# Patient Record
Sex: MALE | Race: WHITE | Employment: OTHER | URBAN - NONMETROPOLITAN AREA
[De-identification: names, ages, dates, MRNs, and addresses within clinical notes are randomized per-mention and may not be internally consistent; named-entity substitution may affect disease eponyms.]

---

## 2017-02-09 RX ORDER — GLIMEPIRIDE 2 MG/1
TABLET ORAL
Qty: 30 TABLET | Refills: 3 | Status: SHIPPED | OUTPATIENT
Start: 2017-02-09 | End: 2017-06-19 | Stop reason: SDUPTHER

## 2017-02-15 ENCOUNTER — OFFICE VISIT (OUTPATIENT)
Dept: FAMILY MEDICINE CLINIC | Age: 57
End: 2017-02-15

## 2017-02-15 VITALS
WEIGHT: 184 LBS | HEART RATE: 100 BPM | RESPIRATION RATE: 18 BRPM | HEIGHT: 65 IN | DIASTOLIC BLOOD PRESSURE: 78 MMHG | SYSTOLIC BLOOD PRESSURE: 132 MMHG | BODY MASS INDEX: 30.66 KG/M2

## 2017-02-15 DIAGNOSIS — Z23 NEED FOR VACCINATION: Primary | ICD-10-CM

## 2017-02-15 DIAGNOSIS — E78.00 PURE HYPERCHOLESTEROLEMIA: ICD-10-CM

## 2017-02-15 DIAGNOSIS — F41.8 DEPRESSION WITH ANXIETY: ICD-10-CM

## 2017-02-15 DIAGNOSIS — E11.9 TYPE 2 DIABETES MELLITUS WITHOUT COMPLICATION, WITHOUT LONG-TERM CURRENT USE OF INSULIN (HCC): ICD-10-CM

## 2017-02-15 DIAGNOSIS — R45.4 IRRITABILITY AND ANGER: ICD-10-CM

## 2017-02-15 DIAGNOSIS — I10 ESSENTIAL HYPERTENSION: ICD-10-CM

## 2017-02-15 PROCEDURE — 90686 IIV4 VACC NO PRSV 0.5 ML IM: CPT | Performed by: PHYSICIAN ASSISTANT

## 2017-02-15 PROCEDURE — 90471 IMMUNIZATION ADMIN: CPT | Performed by: PHYSICIAN ASSISTANT

## 2017-02-15 PROCEDURE — 99214 OFFICE O/P EST MOD 30 MIN: CPT | Performed by: PHYSICIAN ASSISTANT

## 2017-02-15 ASSESSMENT — ENCOUNTER SYMPTOMS
NAUSEA: 0
SHORTNESS OF BREATH: 0
VOMITING: 0
CONSTIPATION: 0
COUGH: 1
DIARRHEA: 0
WHEEZING: 0

## 2017-05-08 DIAGNOSIS — R73.01 IMPAIRED FASTING GLUCOSE: ICD-10-CM

## 2017-05-08 DIAGNOSIS — E78.5 HYPERLIPIDEMIA, UNSPECIFIED HYPERLIPIDEMIA TYPE: Primary | ICD-10-CM

## 2017-06-19 RX ORDER — GLIMEPIRIDE 2 MG/1
TABLET ORAL
Qty: 90 TABLET | Refills: 1 | Status: SHIPPED | OUTPATIENT
Start: 2017-06-19 | End: 2017-12-04 | Stop reason: SDUPTHER

## 2017-09-16 ENCOUNTER — HOSPITAL ENCOUNTER (OUTPATIENT)
Age: 57
Setting detail: SPECIMEN
Discharge: HOME | End: 2017-09-16

## 2017-09-16 ENCOUNTER — OFFICE VISIT (OUTPATIENT)
Dept: PRIMARY CARE CLINIC | Age: 57
End: 2017-09-16

## 2017-09-16 VITALS
WEIGHT: 176 LBS | SYSTOLIC BLOOD PRESSURE: 138 MMHG | OXYGEN SATURATION: 98 % | HEART RATE: 85 BPM | TEMPERATURE: 98 F | HEIGHT: 65 IN | BODY MASS INDEX: 29.32 KG/M2 | DIASTOLIC BLOOD PRESSURE: 80 MMHG

## 2017-09-16 DIAGNOSIS — R31.9 HEMATURIA: ICD-10-CM

## 2017-09-16 DIAGNOSIS — R36.1 HEMATOSPERMIA: Primary | ICD-10-CM

## 2017-09-16 LAB
-: ABNORMAL
AMORPHOUS: ABNORMAL
BACTERIA: ABNORMAL
BILIRUBIN URINE: NEGATIVE
CASTS UA: ABNORMAL /LPF (ref 0–2)
COLOR: ABNORMAL
COMMENT UA: ABNORMAL
CRYSTALS, UA: ABNORMAL /HPF
EPITHELIAL CELLS UA: ABNORMAL /HPF (ref 0–5)
GLUCOSE URINE: NEGATIVE
KETONES, URINE: NEGATIVE
LEUKOCYTE ESTERASE, URINE: NEGATIVE
MUCUS: ABNORMAL
NITRITE, URINE: NEGATIVE
OTHER OBSERVATIONS UA: ABNORMAL
PH UA: 5.5 (ref 5–6)
PROTEIN UA: NEGATIVE
RBC UA: ABNORMAL /HPF (ref 0–4)
RENAL EPITHELIAL, UA: ABNORMAL /HPF
SPECIFIC GRAVITY UA: 1.01 (ref 1.01–1.02)
TRICHOMONAS: ABNORMAL
TURBIDITY: ABNORMAL
URINE HGB: ABNORMAL
UROBILINOGEN, URINE: NORMAL
WBC UA: ABNORMAL /HPF (ref 0–4)
YEAST: ABNORMAL

## 2017-09-16 PROCEDURE — 81001 URINALYSIS AUTO W/SCOPE: CPT

## 2017-09-16 PROCEDURE — 99213 OFFICE O/P EST LOW 20 MIN: CPT | Performed by: FAMILY MEDICINE

## 2017-09-16 RX ORDER — LEVOFLOXACIN 500 MG/1
500 TABLET, FILM COATED ORAL DAILY
Qty: 10 TABLET | Refills: 0 | Status: SHIPPED | OUTPATIENT
Start: 2017-09-16 | End: 2017-09-26

## 2017-12-04 DIAGNOSIS — I10 ESSENTIAL HYPERTENSION, BENIGN: ICD-10-CM

## 2017-12-04 RX ORDER — GLIMEPIRIDE 2 MG/1
TABLET ORAL
Qty: 90 TABLET | Refills: 1 | Status: SHIPPED | OUTPATIENT
Start: 2017-12-04 | End: 2018-11-07 | Stop reason: SDUPTHER

## 2017-12-04 RX ORDER — ENALAPRIL MALEATE 20 MG/1
TABLET ORAL
Qty: 90 TABLET | Refills: 1 | Status: SHIPPED | OUTPATIENT
Start: 2017-12-04 | End: 2018-06-23 | Stop reason: SDUPTHER

## 2017-12-11 ENCOUNTER — HOSPITAL ENCOUNTER (OUTPATIENT)
Dept: LAB | Age: 57
Setting detail: SPECIMEN
Discharge: HOME | End: 2017-12-11

## 2017-12-11 DIAGNOSIS — R73.01 IMPAIRED FASTING GLUCOSE: ICD-10-CM

## 2017-12-11 DIAGNOSIS — E78.5 HYPERLIPIDEMIA, UNSPECIFIED HYPERLIPIDEMIA TYPE: ICD-10-CM

## 2017-12-11 LAB
ALBUMIN SERPL-MCNC: 4.4 G/DL (ref 3.5–5.2)
ALBUMIN/GLOBULIN RATIO: 2 (ref 1–2.5)
ALP BLD-CCNC: 82 U/L (ref 40–129)
ALT SERPL-CCNC: 40 U/L (ref 5–41)
ANION GAP SERPL CALCULATED.3IONS-SCNC: 11 MMOL/L (ref 9–17)
AST SERPL-CCNC: 26 U/L
BILIRUB SERPL-MCNC: 0.72 MG/DL (ref 0.3–1.2)
BUN BLDV-MCNC: 13 MG/DL (ref 6–20)
BUN/CREAT BLD: 16 (ref 9–20)
CALCIUM SERPL-MCNC: 9.6 MG/DL (ref 8.6–10.4)
CHLORIDE BLD-SCNC: 103 MMOL/L (ref 98–107)
CHOLESTEROL/HDL RATIO: 3.4
CHOLESTEROL: 157 MG/DL
CO2: 29 MMOL/L (ref 20–31)
CREAT SERPL-MCNC: 0.79 MG/DL (ref 0.7–1.2)
CREATININE URINE: 141.4 MG/DL (ref 39–259)
ESTIMATED AVERAGE GLUCOSE: 108 MG/DL
GFR AFRICAN AMERICAN: >60 ML/MIN
GFR NON-AFRICAN AMERICAN: >60 ML/MIN
GFR SERPL CREATININE-BSD FRML MDRD: ABNORMAL ML/MIN/{1.73_M2}
GFR SERPL CREATININE-BSD FRML MDRD: ABNORMAL ML/MIN/{1.73_M2}
GLUCOSE BLD-MCNC: 142 MG/DL (ref 70–99)
HBA1C MFR BLD: 5.4 % (ref 4.8–5.9)
HDLC SERPL-MCNC: 46 MG/DL
LDL CHOLESTEROL: 92 MG/DL (ref 0–130)
MICROALBUMIN/CREAT 24H UR: <12 MG/L
MICROALBUMIN/CREAT UR-RTO: NORMAL MCG/MG CREAT
POTASSIUM SERPL-SCNC: 4.4 MMOL/L (ref 3.7–5.3)
SODIUM BLD-SCNC: 143 MMOL/L (ref 135–144)
TOTAL PROTEIN: 6.6 G/DL (ref 6.4–8.3)
TRIGL SERPL-MCNC: 93 MG/DL
VLDLC SERPL CALC-MCNC: NORMAL MG/DL (ref 1–30)

## 2017-12-11 PROCEDURE — 82043 UR ALBUMIN QUANTITATIVE: CPT

## 2017-12-11 PROCEDURE — 36415 COLL VENOUS BLD VENIPUNCTURE: CPT

## 2017-12-11 PROCEDURE — 80053 COMPREHEN METABOLIC PANEL: CPT

## 2017-12-11 PROCEDURE — 80061 LIPID PANEL: CPT

## 2017-12-11 PROCEDURE — 83036 HEMOGLOBIN GLYCOSYLATED A1C: CPT

## 2017-12-11 PROCEDURE — 82570 ASSAY OF URINE CREATININE: CPT

## 2017-12-15 ENCOUNTER — OFFICE VISIT (OUTPATIENT)
Dept: FAMILY MEDICINE CLINIC | Age: 57
End: 2017-12-15

## 2017-12-15 VITALS
WEIGHT: 173.4 LBS | HEART RATE: 80 BPM | BODY MASS INDEX: 28.89 KG/M2 | DIASTOLIC BLOOD PRESSURE: 80 MMHG | HEIGHT: 65 IN | SYSTOLIC BLOOD PRESSURE: 136 MMHG | OXYGEN SATURATION: 98 % | RESPIRATION RATE: 12 BRPM | TEMPERATURE: 98.4 F

## 2017-12-15 DIAGNOSIS — L85.3 DRY SKIN DERMATITIS: ICD-10-CM

## 2017-12-15 DIAGNOSIS — Z23 NEED FOR INFLUENZA VACCINATION: Primary | ICD-10-CM

## 2017-12-15 DIAGNOSIS — M67.431 GANGLION CYST OF DORSUM OF RIGHT WRIST: ICD-10-CM

## 2017-12-15 PROCEDURE — 90471 IMMUNIZATION ADMIN: CPT | Performed by: NURSE PRACTITIONER

## 2017-12-15 PROCEDURE — 90686 IIV4 VACC NO PRSV 0.5 ML IM: CPT | Performed by: NURSE PRACTITIONER

## 2017-12-15 PROCEDURE — 99214 OFFICE O/P EST MOD 30 MIN: CPT | Performed by: NURSE PRACTITIONER

## 2017-12-15 RX ORDER — AMMONIUM LACTATE 12 G/100G
LOTION TOPICAL
Qty: 1 BOTTLE | Refills: 0 | Status: SHIPPED | OUTPATIENT
Start: 2017-12-15 | End: 2019-05-22

## 2017-12-15 ASSESSMENT — ENCOUNTER SYMPTOMS
CONSTIPATION: 0
COUGH: 0
EYES NEGATIVE: 1
DIARRHEA: 0
TROUBLE SWALLOWING: 0
VOMITING: 0
SHORTNESS OF BREATH: 0
SINUS PRESSURE: 0
NAUSEA: 0
CHEST TIGHTNESS: 0
ABDOMINAL PAIN: 0
ALLERGIC/IMMUNOLOGIC NEGATIVE: 1

## 2017-12-15 NOTE — PROGRESS NOTES
pain, sinus pressure and trouble swallowing. Eyes: Negative. Respiratory: Negative for cough, chest tightness and shortness of breath. Cardiovascular: Negative for chest pain and palpitations. Gastrointestinal: Negative for abdominal pain, constipation, diarrhea, nausea and vomiting. Endocrine: Negative. Genitourinary: Negative for difficulty urinating and dysuria. Musculoskeletal: Negative. Skin: Negative. Cyst to right wrist that started a few months ago  Dry cracked skin on hands     Allergic/Immunologic: Negative. Neurological: Negative for dizziness, light-headedness and headaches. Hematological: Negative. Psychiatric/Behavioral: Negative. Objective:      BP (!) 138/90 (Site: Right Arm, Position: Sitting, Cuff Size: Medium Adult)   Pulse 93   Temp 98.4 °F (36.9 °C) (Tympanic)   Resp 12   Ht 5' 5\" (1.651 m)   Wt 173 lb 6.4 oz (78.7 kg)   SpO2 98%   BMI 28.86 kg/m²     Physical Exam   Constitutional: He is oriented to person, place, and time. He appears well-developed and well-nourished. HENT:   Head: Normocephalic and atraumatic. Right Ear: Hearing and external ear normal.   Left Ear: Hearing and external ear normal.   Nose: Nose normal.   Mouth/Throat: Uvula is midline, oropharynx is clear and moist and mucous membranes are normal.   Eyes: Conjunctivae and EOM are normal. Pupils are equal, round, and reactive to light. Neck: Normal range of motion. Neck supple. Cardiovascular: Normal rate, regular rhythm, normal heart sounds and intact distal pulses. Pulmonary/Chest: Effort normal and breath sounds normal.   Abdominal: Soft. Bowel sounds are normal.   Musculoskeletal: Normal range of motion. Arms:       Hands:  Hard ganglionic cyst noted  Skin is flaky and dry with an open crack at the crease of his thumb. Neurological: He is alert and oriented to person, place, and time. Skin: Skin is warm and dry.    Psychiatric: He has a normal mood and affect. His behavior is normal. Judgment and thought content normal.         Assessment & Plan:      1. Need for influenza vaccination    - INFLUENZA, QUADV, 3 YRS AND OLDER, IM, PF, PREFILL SYR OR SDV, 0.5ML (FLUZONE QUADV, PF)    2. Ganglion cyst of dorsum of right wrist-acute new  problem  Patient was given all of his options and at this time he would like to continue to monitor. Will call if he changes his mind. 3. Dry skin dermatitis-acute new problem  trial  - ammonium lactate (LAC-HYDRIN) 12 % lotion; Apply topically daily. Dispense: 1 Bottle; Refill: 0  Answered all of the patient's questions. Agrees with plan of care. Follow up PRN.     Trice Cruz NP  12/15/2017 10:09 AM

## 2018-02-07 ENCOUNTER — OFFICE VISIT (OUTPATIENT)
Dept: ORTHOPEDIC SURGERY | Age: 58
End: 2018-02-07

## 2018-02-07 ENCOUNTER — HOSPITAL ENCOUNTER (OUTPATIENT)
Dept: GENERAL RADIOLOGY | Age: 58
Discharge: HOME | End: 2018-02-09

## 2018-02-07 ENCOUNTER — OFFICE VISIT (OUTPATIENT)
Dept: FAMILY MEDICINE CLINIC | Age: 58
End: 2018-02-07

## 2018-02-07 VITALS
HEIGHT: 65 IN | WEIGHT: 174 LBS | DIASTOLIC BLOOD PRESSURE: 78 MMHG | SYSTOLIC BLOOD PRESSURE: 130 MMHG | OXYGEN SATURATION: 98 % | HEART RATE: 83 BPM | BODY MASS INDEX: 28.99 KG/M2

## 2018-02-07 VITALS
SYSTOLIC BLOOD PRESSURE: 135 MMHG | HEIGHT: 65 IN | BODY MASS INDEX: 29.49 KG/M2 | DIASTOLIC BLOOD PRESSURE: 79 MMHG | HEART RATE: 93 BPM | WEIGHT: 177 LBS

## 2018-02-07 DIAGNOSIS — F41.9 ANXIETY: ICD-10-CM

## 2018-02-07 DIAGNOSIS — E78.2 MIXED HYPERLIPIDEMIA: ICD-10-CM

## 2018-02-07 DIAGNOSIS — I10 ESSENTIAL HYPERTENSION: ICD-10-CM

## 2018-02-07 DIAGNOSIS — M67.431 GANGLION, RIGHT WRIST: Primary | ICD-10-CM

## 2018-02-07 DIAGNOSIS — M67.431 GANGLION CYST OF WRIST, RIGHT: Primary | ICD-10-CM

## 2018-02-07 DIAGNOSIS — E11.9 TYPE 2 DIABETES MELLITUS WITHOUT COMPLICATION, WITHOUT LONG-TERM CURRENT USE OF INSULIN (HCC): ICD-10-CM

## 2018-02-07 DIAGNOSIS — N52.9 ERECTILE DYSFUNCTION, UNSPECIFIED ERECTILE DYSFUNCTION TYPE: ICD-10-CM

## 2018-02-07 DIAGNOSIS — M67.431 GANGLION CYST OF WRIST, RIGHT: ICD-10-CM

## 2018-02-07 PROCEDURE — 99203 OFFICE O/P NEW LOW 30 MIN: CPT | Performed by: FAMILY MEDICINE

## 2018-02-07 PROCEDURE — 99214 OFFICE O/P EST MOD 30 MIN: CPT | Performed by: PHYSICIAN ASSISTANT

## 2018-02-07 PROCEDURE — 73110 X-RAY EXAM OF WRIST: CPT

## 2018-02-07 PROCEDURE — 20605 DRAIN/INJ JOINT/BURSA W/O US: CPT | Performed by: FAMILY MEDICINE

## 2018-02-07 RX ORDER — TRIAMCINOLONE ACETONIDE 40 MG/ML
40 INJECTION, SUSPENSION INTRA-ARTICULAR; INTRAMUSCULAR ONCE
Status: COMPLETED | OUTPATIENT
Start: 2018-02-07 | End: 2018-02-07

## 2018-02-07 RX ADMIN — TRIAMCINOLONE ACETONIDE 40 MG: 40 INJECTION, SUSPENSION INTRA-ARTICULAR; INTRAMUSCULAR at 13:44

## 2018-02-07 ASSESSMENT — ENCOUNTER SYMPTOMS
RESPIRATORY NEGATIVE: 1
GASTROINTESTINAL NEGATIVE: 1

## 2018-02-07 ASSESSMENT — PATIENT HEALTH QUESTIONNAIRE - PHQ9
1. LITTLE INTEREST OR PLEASURE IN DOING THINGS: 0
2. FEELING DOWN, DEPRESSED OR HOPELESS: 0
SUM OF ALL RESPONSES TO PHQ QUESTIONS 1-9: 0
SUM OF ALL RESPONSES TO PHQ9 QUESTIONS 1 & 2: 0

## 2018-02-07 NOTE — PROGRESS NOTES
mmol/L Final    Chloride 12/11/2017 103  98 - 107 mmol/L Final    CO2 12/11/2017 29  20 - 31 mmol/L Final    Anion Gap 12/11/2017 11  9 - 17 mmol/L Final    Alkaline Phosphatase 12/11/2017 82  40 - 129 U/L Final    ALT 12/11/2017 40  5 - 41 U/L Final    AST 12/11/2017 26  <40 U/L Final    Total Bilirubin 12/11/2017 0.72  0.3 - 1.2 mg/dL Final    Total Protein 12/11/2017 6.6  6.4 - 8.3 g/dL Final    Alb 12/11/2017 4.4  3.5 - 5.2 g/dL Final    Albumin/Globulin Ratio 12/11/2017 2.0  1.0 - 2.5 Final    GFR Non- 12/11/2017 >60  >60 mL/min Final    GFR  12/11/2017 >60  >60 mL/min Final    GFR Comment 12/11/2017        Final    Comment: Average GFR for 52-63 years old:   80 mL/min/1.73sq m  Chronic Kidney Disease:   <60 mL/min/1.73sq m  Kidney failure:   <15 mL/min/1.73sq m              eGFR calculated using average adult body mass. Additional eGFR calculator   available at:        [email or web address]        Performed at [facility] [address] [facility] Kelsi Pabon [phone]      GFR Staging 12/11/2017 NOT REPORTED   Final    Cholesterol 12/11/2017 157  <200 mg/dL Final    Comment:    Cholesterol Guidelines:      <200  Desirable   200-240  Borderline      >240  Undesirable         HDL 12/11/2017 46  >40 mg/dL Final    Comment:    HDL Guidelines:    <40     Undesirable   40-59    Borderline    >59     Desirable         LDL Cholesterol 12/11/2017 92  0 - 130 mg/dL Final    Comment:    LDL Guidelines:     <100    Desirable   100-129   Near to/above Desirable   130-159   Borderline      >159   Undesirable     Direct (measured) LDL and calculated LDL are not interchangeable tests.       Chol/HDL Ratio 12/11/2017 3.4  <5 Final    Triglycerides 12/11/2017 93  <150 mg/dL Final    Comment:    Triglyceride Guidelines:     <150   Desirable   150-199  Borderline   200-499  High     >499   Very high   Based

## 2018-02-10 ASSESSMENT — ENCOUNTER SYMPTOMS
SINUS PAIN: 0
SORE THROAT: 0
RHINORRHEA: 0
CONSTIPATION: 0
NAUSEA: 0
SINUS PRESSURE: 0
COUGH: 0
DIARRHEA: 0
WHEEZING: 0
CHEST TIGHTNESS: 0
VOMITING: 0
TROUBLE SWALLOWING: 0
SHORTNESS OF BREATH: 0

## 2018-06-13 ENCOUNTER — TELEPHONE (OUTPATIENT)
Dept: FAMILY MEDICINE CLINIC | Age: 58
End: 2018-06-13

## 2018-06-13 DIAGNOSIS — R73.01 IFG (IMPAIRED FASTING GLUCOSE): ICD-10-CM

## 2018-06-13 DIAGNOSIS — F32.A DEPRESSION, UNSPECIFIED DEPRESSION TYPE: ICD-10-CM

## 2018-06-13 DIAGNOSIS — E78.5 HYPERLIPIDEMIA, UNSPECIFIED HYPERLIPIDEMIA TYPE: ICD-10-CM

## 2018-06-13 DIAGNOSIS — I15.9 SECONDARY HYPERTENSION: Primary | ICD-10-CM

## 2018-06-14 ENCOUNTER — HOSPITAL ENCOUNTER (OUTPATIENT)
Dept: LAB | Age: 58
Setting detail: SPECIMEN
Discharge: HOME | End: 2018-06-14

## 2018-06-14 DIAGNOSIS — E78.5 HYPERLIPIDEMIA, UNSPECIFIED HYPERLIPIDEMIA TYPE: ICD-10-CM

## 2018-06-14 DIAGNOSIS — I15.9 SECONDARY HYPERTENSION: ICD-10-CM

## 2018-06-14 DIAGNOSIS — R73.01 IFG (IMPAIRED FASTING GLUCOSE): ICD-10-CM

## 2018-06-14 DIAGNOSIS — F32.A DEPRESSION, UNSPECIFIED DEPRESSION TYPE: ICD-10-CM

## 2018-06-14 LAB
ALBUMIN SERPL-MCNC: 4.4 G/DL (ref 3.5–5.2)
ALBUMIN/GLOBULIN RATIO: 2 (ref 1–2.5)
ALP BLD-CCNC: 82 U/L (ref 40–129)
ALT SERPL-CCNC: 56 U/L (ref 5–41)
ANION GAP SERPL CALCULATED.3IONS-SCNC: 11 MMOL/L (ref 9–17)
AST SERPL-CCNC: 29 U/L
BILIRUB SERPL-MCNC: 0.68 MG/DL (ref 0.3–1.2)
BUN BLDV-MCNC: 14 MG/DL (ref 6–20)
BUN/CREAT BLD: 18 (ref 9–20)
CALCIUM SERPL-MCNC: 9.5 MG/DL (ref 8.6–10.4)
CHLORIDE BLD-SCNC: 102 MMOL/L (ref 98–107)
CHOLESTEROL/HDL RATIO: 3.6
CHOLESTEROL: 160 MG/DL
CO2: 28 MMOL/L (ref 20–31)
CREAT SERPL-MCNC: 0.77 MG/DL (ref 0.7–1.2)
CREATININE URINE: 113.9 MG/DL (ref 39–259)
ESTIMATED AVERAGE GLUCOSE: 111 MG/DL
GFR AFRICAN AMERICAN: >60 ML/MIN
GFR NON-AFRICAN AMERICAN: >60 ML/MIN
GFR SERPL CREATININE-BSD FRML MDRD: ABNORMAL ML/MIN/{1.73_M2}
GFR SERPL CREATININE-BSD FRML MDRD: ABNORMAL ML/MIN/{1.73_M2}
GLUCOSE BLD-MCNC: 133 MG/DL (ref 70–99)
HBA1C MFR BLD: 5.5 % (ref 4.8–5.9)
HDLC SERPL-MCNC: 45 MG/DL
LDL CHOLESTEROL: 96 MG/DL (ref 0–130)
MICROALBUMIN/CREAT 24H UR: <12 MG/L
MICROALBUMIN/CREAT UR-RTO: NORMAL MCG/MG CREAT
POTASSIUM SERPL-SCNC: 4.3 MMOL/L (ref 3.7–5.3)
SODIUM BLD-SCNC: 141 MMOL/L (ref 135–144)
TOTAL PROTEIN: 6.6 G/DL (ref 6.4–8.3)
TRIGL SERPL-MCNC: 95 MG/DL
TSH SERPL DL<=0.05 MIU/L-ACNC: 2.95 MIU/L (ref 0.3–5)
VLDLC SERPL CALC-MCNC: NORMAL MG/DL (ref 1–30)

## 2018-06-14 PROCEDURE — 83036 HEMOGLOBIN GLYCOSYLATED A1C: CPT

## 2018-06-14 PROCEDURE — 84443 ASSAY THYROID STIM HORMONE: CPT

## 2018-06-14 PROCEDURE — 80053 COMPREHEN METABOLIC PANEL: CPT

## 2018-06-14 PROCEDURE — 82570 ASSAY OF URINE CREATININE: CPT

## 2018-06-14 PROCEDURE — 82043 UR ALBUMIN QUANTITATIVE: CPT

## 2018-06-14 PROCEDURE — 80061 LIPID PANEL: CPT

## 2018-06-14 PROCEDURE — 36415 COLL VENOUS BLD VENIPUNCTURE: CPT

## 2018-06-15 ENCOUNTER — OFFICE VISIT (OUTPATIENT)
Dept: FAMILY MEDICINE CLINIC | Age: 58
End: 2018-06-15

## 2018-06-15 VITALS
HEIGHT: 65 IN | RESPIRATION RATE: 16 BRPM | BODY MASS INDEX: 28.66 KG/M2 | SYSTOLIC BLOOD PRESSURE: 124 MMHG | HEART RATE: 80 BPM | DIASTOLIC BLOOD PRESSURE: 60 MMHG | WEIGHT: 172 LBS | OXYGEN SATURATION: 97 %

## 2018-06-15 DIAGNOSIS — F32.9 REACTIVE DEPRESSION: ICD-10-CM

## 2018-06-15 DIAGNOSIS — E11.9 TYPE 2 DIABETES MELLITUS WITHOUT COMPLICATION, WITHOUT LONG-TERM CURRENT USE OF INSULIN (HCC): Primary | ICD-10-CM

## 2018-06-15 PROCEDURE — 99214 OFFICE O/P EST MOD 30 MIN: CPT | Performed by: PHYSICIAN ASSISTANT

## 2018-06-15 ASSESSMENT — PATIENT HEALTH QUESTIONNAIRE - PHQ9
SUM OF ALL RESPONSES TO PHQ QUESTIONS 1-9: 0
SUM OF ALL RESPONSES TO PHQ9 QUESTIONS 1 & 2: 0
2. FEELING DOWN, DEPRESSED OR HOPELESS: 0
1. LITTLE INTEREST OR PLEASURE IN DOING THINGS: 0

## 2018-06-15 ASSESSMENT — ENCOUNTER SYMPTOMS
RESPIRATORY NEGATIVE: 1
GASTROINTESTINAL NEGATIVE: 1

## 2018-06-20 ENCOUNTER — OFFICE VISIT (OUTPATIENT)
Dept: ORTHOPEDIC SURGERY | Age: 58
End: 2018-06-20

## 2018-06-20 VITALS
BODY MASS INDEX: 28.66 KG/M2 | HEART RATE: 84 BPM | HEIGHT: 65 IN | SYSTOLIC BLOOD PRESSURE: 152 MMHG | WEIGHT: 172 LBS | DIASTOLIC BLOOD PRESSURE: 80 MMHG

## 2018-06-20 DIAGNOSIS — M67.431 GANGLION, RIGHT WRIST: Primary | ICD-10-CM

## 2018-06-20 PROCEDURE — 20550 NJX 1 TENDON SHEATH/LIGAMENT: CPT | Performed by: FAMILY MEDICINE

## 2018-06-20 PROCEDURE — 99213 OFFICE O/P EST LOW 20 MIN: CPT | Performed by: FAMILY MEDICINE

## 2018-06-20 RX ORDER — TRIAMCINOLONE ACETONIDE 40 MG/ML
20 INJECTION, SUSPENSION INTRA-ARTICULAR; INTRAMUSCULAR ONCE
Status: COMPLETED | OUTPATIENT
Start: 2018-06-20 | End: 2018-06-20

## 2018-06-20 RX ADMIN — TRIAMCINOLONE ACETONIDE 20 MG: 40 INJECTION, SUSPENSION INTRA-ARTICULAR; INTRAMUSCULAR at 14:06

## 2018-06-23 DIAGNOSIS — I10 ESSENTIAL HYPERTENSION, BENIGN: ICD-10-CM

## 2018-06-25 RX ORDER — ENALAPRIL MALEATE 20 MG/1
TABLET ORAL
Qty: 90 TABLET | Refills: 1 | Status: SHIPPED | OUTPATIENT
Start: 2018-06-25 | End: 2019-01-02 | Stop reason: SDUPTHER

## 2018-09-10 ENCOUNTER — NURSE ONLY (OUTPATIENT)
Dept: LAB | Age: 58
End: 2018-09-10

## 2018-09-10 ENCOUNTER — HOSPITAL ENCOUNTER (OUTPATIENT)
Dept: LAB | Age: 58
Setting detail: SPECIMEN
Discharge: HOME | End: 2018-09-10

## 2018-09-10 VITALS — DIASTOLIC BLOOD PRESSURE: 84 MMHG | SYSTOLIC BLOOD PRESSURE: 136 MMHG | HEART RATE: 74 BPM

## 2018-09-10 DIAGNOSIS — Z01.30 BP CHECK: Primary | ICD-10-CM

## 2018-09-10 DIAGNOSIS — E11.9 TYPE 2 DIABETES MELLITUS WITHOUT COMPLICATION, WITHOUT LONG-TERM CURRENT USE OF INSULIN (HCC): ICD-10-CM

## 2018-09-10 LAB
ALBUMIN SERPL-MCNC: 4.7 G/DL (ref 3.5–5.2)
ALBUMIN/GLOBULIN RATIO: 1.9 (ref 1–2.5)
ALP BLD-CCNC: 86 U/L (ref 40–129)
ALT SERPL-CCNC: 54 U/L (ref 5–41)
ANION GAP SERPL CALCULATED.3IONS-SCNC: 10 MMOL/L (ref 9–17)
AST SERPL-CCNC: 30 U/L
BILIRUB SERPL-MCNC: 0.58 MG/DL (ref 0.3–1.2)
BUN BLDV-MCNC: 14 MG/DL (ref 6–20)
BUN/CREAT BLD: 17 (ref 9–20)
CALCIUM SERPL-MCNC: 9.6 MG/DL (ref 8.6–10.4)
CHLORIDE BLD-SCNC: 101 MMOL/L (ref 98–107)
CO2: 29 MMOL/L (ref 20–31)
CREAT SERPL-MCNC: 0.82 MG/DL (ref 0.7–1.2)
ESTIMATED AVERAGE GLUCOSE: 126 MG/DL
GFR AFRICAN AMERICAN: >60 ML/MIN
GFR NON-AFRICAN AMERICAN: >60 ML/MIN
GFR SERPL CREATININE-BSD FRML MDRD: ABNORMAL ML/MIN/{1.73_M2}
GFR SERPL CREATININE-BSD FRML MDRD: ABNORMAL ML/MIN/{1.73_M2}
GLUCOSE BLD-MCNC: 154 MG/DL (ref 70–99)
HBA1C MFR BLD: 6 % (ref 4.8–5.9)
POTASSIUM SERPL-SCNC: 5 MMOL/L (ref 3.7–5.3)
SODIUM BLD-SCNC: 140 MMOL/L (ref 135–144)
TOTAL PROTEIN: 7.2 G/DL (ref 6.4–8.3)

## 2018-09-10 PROCEDURE — 80053 COMPREHEN METABOLIC PANEL: CPT

## 2018-09-10 PROCEDURE — 83036 HEMOGLOBIN GLYCOSYLATED A1C: CPT

## 2018-09-10 PROCEDURE — 36415 COLL VENOUS BLD VENIPUNCTURE: CPT

## 2018-09-11 ENCOUNTER — TELEPHONE (OUTPATIENT)
Dept: FAMILY MEDICINE CLINIC | Age: 58
End: 2018-09-11

## 2018-09-11 NOTE — TELEPHONE ENCOUNTER
Spoke with patient about his lab results and he says he has been taking the metformin as ordered except for the night before the test he did not take his second dose.

## 2018-09-12 DIAGNOSIS — R73.09 ELEVATED GLUCOSE: Primary | ICD-10-CM

## 2018-09-12 DIAGNOSIS — E78.5 HYPERLIPIDEMIA, UNSPECIFIED HYPERLIPIDEMIA TYPE: ICD-10-CM

## 2018-09-12 NOTE — TELEPHONE ENCOUNTER
[facility] spoke with him he is to take amaryl again and take all medications as prescribed  Repeat labs 3 months

## 2018-11-07 RX ORDER — GLIMEPIRIDE 2 MG/1
TABLET ORAL
Qty: 90 TABLET | Refills: 1 | Status: SHIPPED | OUTPATIENT
Start: 2018-11-07 | End: 2019-05-11 | Stop reason: SDUPTHER

## 2018-11-12 ENCOUNTER — OFFICE VISIT (OUTPATIENT)
Dept: FAMILY MEDICINE CLINIC | Age: 58
End: 2018-11-12

## 2018-11-12 VITALS
OXYGEN SATURATION: 98 % | DIASTOLIC BLOOD PRESSURE: 65 MMHG | BODY MASS INDEX: 29.32 KG/M2 | HEIGHT: 65 IN | HEART RATE: 118 BPM | SYSTOLIC BLOOD PRESSURE: 139 MMHG | WEIGHT: 176 LBS

## 2018-11-12 DIAGNOSIS — J01.90 ACUTE BACTERIAL SINUSITIS: Primary | ICD-10-CM

## 2018-11-12 DIAGNOSIS — R09.82 PND (POST-NASAL DRIP): ICD-10-CM

## 2018-11-12 DIAGNOSIS — R05.9 COUGH: ICD-10-CM

## 2018-11-12 DIAGNOSIS — B96.89 ACUTE BACTERIAL SINUSITIS: Primary | ICD-10-CM

## 2018-11-12 PROCEDURE — 99213 OFFICE O/P EST LOW 20 MIN: CPT | Performed by: NURSE PRACTITIONER

## 2018-11-12 RX ORDER — AMOXICILLIN 500 MG/1
500 CAPSULE ORAL 3 TIMES DAILY
Qty: 30 CAPSULE | Refills: 0 | Status: SHIPPED | OUTPATIENT
Start: 2018-11-12 | End: 2018-11-30

## 2018-11-12 RX ORDER — LORATADINE 10 MG/1
10 TABLET ORAL DAILY
Qty: 30 TABLET | Refills: 0 | Status: SHIPPED | OUTPATIENT
Start: 2018-11-12 | End: 2018-12-12

## 2018-11-12 RX ORDER — BENZONATATE 100 MG/1
100 CAPSULE ORAL 2 TIMES DAILY PRN
Qty: 20 CAPSULE | Refills: 0 | Status: SHIPPED | OUTPATIENT
Start: 2018-11-12 | End: 2018-11-19

## 2018-11-12 ASSESSMENT — ENCOUNTER SYMPTOMS
SINUS PRESSURE: 0
EYES NEGATIVE: 1
TROUBLE SWALLOWING: 0
ABDOMINAL PAIN: 0
RHINORRHEA: 1
SORE THROAT: 1
COUGH: 1
CONSTIPATION: 0
VOMITING: 0
SHORTNESS OF BREATH: 0
NAUSEA: 0
DIARRHEA: 0
CHEST TIGHTNESS: 0
ALLERGIC/IMMUNOLOGIC NEGATIVE: 1

## 2018-11-30 ENCOUNTER — OFFICE VISIT (OUTPATIENT)
Dept: FAMILY MEDICINE CLINIC | Age: 58
End: 2018-11-30

## 2018-11-30 ENCOUNTER — HOSPITAL ENCOUNTER (OUTPATIENT)
Dept: LAB | Age: 58
Discharge: HOME | End: 2018-11-30

## 2018-11-30 VITALS
DIASTOLIC BLOOD PRESSURE: 70 MMHG | OXYGEN SATURATION: 97 % | SYSTOLIC BLOOD PRESSURE: 138 MMHG | WEIGHT: 174 LBS | BODY MASS INDEX: 28.99 KG/M2 | HEART RATE: 80 BPM | HEIGHT: 65 IN

## 2018-11-30 DIAGNOSIS — Z23 FLU VACCINE NEED: ICD-10-CM

## 2018-11-30 DIAGNOSIS — N30.01 ACUTE CYSTITIS WITH HEMATURIA: Primary | ICD-10-CM

## 2018-11-30 DIAGNOSIS — E78.5 HYPERLIPIDEMIA, UNSPECIFIED HYPERLIPIDEMIA TYPE: ICD-10-CM

## 2018-11-30 DIAGNOSIS — R31.9 HEMATURIA, UNSPECIFIED TYPE: ICD-10-CM

## 2018-11-30 DIAGNOSIS — R73.09 ELEVATED GLUCOSE: ICD-10-CM

## 2018-11-30 DIAGNOSIS — R82.998 DARK URINE: ICD-10-CM

## 2018-11-30 LAB
-: ABNORMAL
ALBUMIN SERPL-MCNC: 4.2 G/DL (ref 3.5–5.2)
ALBUMIN/GLOBULIN RATIO: 1.5 (ref 1–2.5)
ALP BLD-CCNC: 72 U/L (ref 40–129)
ALT SERPL-CCNC: 53 U/L (ref 5–41)
AMORPHOUS: ABNORMAL
ANION GAP SERPL CALCULATED.3IONS-SCNC: 13 MMOL/L (ref 9–17)
AST SERPL-CCNC: 42 U/L
BACTERIA: ABNORMAL
BILIRUB SERPL-MCNC: 0.82 MG/DL (ref 0.3–1.2)
BILIRUBIN URINE: ABNORMAL
BUN BLDV-MCNC: 14 MG/DL (ref 6–20)
BUN/CREAT BLD: 15 (ref 9–20)
CALCIUM SERPL-MCNC: 9.2 MG/DL (ref 8.6–10.4)
CASTS UA: ABNORMAL /LPF (ref 0–2)
CHLORIDE BLD-SCNC: 95 MMOL/L (ref 98–107)
CHOLESTEROL/HDL RATIO: 5.5
CHOLESTEROL: 154 MG/DL
CO2: 26 MMOL/L (ref 20–31)
COLOR: ABNORMAL
COMMENT UA: ABNORMAL
CREAT SERPL-MCNC: 0.94 MG/DL (ref 0.7–1.2)
CREATININE URINE: 349.1 MG/DL (ref 39–259)
CRYSTALS, UA: ABNORMAL /HPF
EPITHELIAL CELLS UA: ABNORMAL /HPF (ref 0–5)
ESTIMATED AVERAGE GLUCOSE: 117 MG/DL
GFR AFRICAN AMERICAN: >60 ML/MIN
GFR NON-AFRICAN AMERICAN: >60 ML/MIN
GFR SERPL CREATININE-BSD FRML MDRD: ABNORMAL ML/MIN/{1.73_M2}
GFR SERPL CREATININE-BSD FRML MDRD: ABNORMAL ML/MIN/{1.73_M2}
GLUCOSE BLD-MCNC: 184 MG/DL (ref 70–99)
GLUCOSE URINE: NEGATIVE
HBA1C MFR BLD: 5.7 % (ref 4.8–5.9)
HDLC SERPL-MCNC: 28 MG/DL
KETONES, URINE: NEGATIVE
LDL CHOLESTEROL: 100 MG/DL (ref 0–130)
LEUKOCYTE ESTERASE, URINE: ABNORMAL
MICROALBUMIN/CREAT 24H UR: 176 MG/L
MICROALBUMIN/CREAT UR-RTO: 50 MCG/MG CREAT
MUCUS: ABNORMAL
NITRITE, URINE: POSITIVE
OTHER OBSERVATIONS UA: ABNORMAL
PH UA: 6 (ref 5–6)
POTASSIUM SERPL-SCNC: 4.7 MMOL/L (ref 3.7–5.3)
PROTEIN UA: ABNORMAL
RBC UA: ABNORMAL /HPF (ref 0–4)
RENAL EPITHELIAL, UA: ABNORMAL /HPF
SODIUM BLD-SCNC: 134 MMOL/L (ref 135–144)
SPECIFIC GRAVITY UA: 1.02 (ref 1.01–1.02)
TOTAL PROTEIN: 7 G/DL (ref 6.4–8.3)
TRICHOMONAS: ABNORMAL
TRIGL SERPL-MCNC: 131 MG/DL
TURBIDITY: ABNORMAL
URINE HGB: ABNORMAL
UROBILINOGEN, URINE: NORMAL
VLDLC SERPL CALC-MCNC: ABNORMAL MG/DL (ref 1–30)
WBC UA: >50 /HPF (ref 0–4)
YEAST: ABNORMAL

## 2018-11-30 PROCEDURE — 81001 URINALYSIS AUTO W/SCOPE: CPT

## 2018-11-30 PROCEDURE — 83036 HEMOGLOBIN GLYCOSYLATED A1C: CPT

## 2018-11-30 PROCEDURE — 90471 IMMUNIZATION ADMIN: CPT | Performed by: NURSE PRACTITIONER

## 2018-11-30 PROCEDURE — 99213 OFFICE O/P EST LOW 20 MIN: CPT | Performed by: NURSE PRACTITIONER

## 2018-11-30 PROCEDURE — 90686 IIV4 VACC NO PRSV 0.5 ML IM: CPT | Performed by: NURSE PRACTITIONER

## 2018-11-30 PROCEDURE — 80061 LIPID PANEL: CPT

## 2018-11-30 PROCEDURE — 82043 UR ALBUMIN QUANTITATIVE: CPT

## 2018-11-30 PROCEDURE — 80053 COMPREHEN METABOLIC PANEL: CPT

## 2018-11-30 PROCEDURE — 87086 URINE CULTURE/COLONY COUNT: CPT

## 2018-11-30 PROCEDURE — 82570 ASSAY OF URINE CREATININE: CPT

## 2018-11-30 PROCEDURE — 87186 SC STD MICRODIL/AGAR DIL: CPT

## 2018-11-30 PROCEDURE — 36415 COLL VENOUS BLD VENIPUNCTURE: CPT

## 2018-11-30 PROCEDURE — 87088 URINE BACTERIA CULTURE: CPT

## 2018-11-30 RX ORDER — SULFAMETHOXAZOLE AND TRIMETHOPRIM 800; 160 MG/1; MG/1
1 TABLET ORAL 2 TIMES DAILY
Qty: 20 TABLET | Refills: 0 | Status: SHIPPED | OUTPATIENT
Start: 2018-11-30 | End: 2018-12-14 | Stop reason: ALTCHOICE

## 2018-11-30 RX ORDER — CIPROFLOXACIN 500 MG/1
500 TABLET, FILM COATED ORAL 2 TIMES DAILY
Qty: 20 TABLET | Refills: 0 | Status: CANCELLED | OUTPATIENT
Start: 2018-11-30

## 2018-11-30 ASSESSMENT — ENCOUNTER SYMPTOMS
ALLERGIC/IMMUNOLOGIC NEGATIVE: 1
DIARRHEA: 0
COUGH: 0
ABDOMINAL PAIN: 0
NAUSEA: 0
CONSTIPATION: 0
VOMITING: 0
SHORTNESS OF BREATH: 0
TROUBLE SWALLOWING: 0
CHEST TIGHTNESS: 0
EYES NEGATIVE: 1
GASTROINTESTINAL NEGATIVE: 1
SINUS PRESSURE: 0

## 2018-11-30 NOTE — PROGRESS NOTES
congestion, ear pain, sinus pressure and trouble swallowing. Eyes: Negative. Respiratory: Negative for cough, chest tightness and shortness of breath. Cardiovascular: Negative. Negative for chest pain and palpitations. Gastrointestinal: Negative. Negative for abdominal pain, constipation, diarrhea, nausea and vomiting. Endocrine: Negative. Genitourinary: Positive for decreased urine volume and urgency. Negative for difficulty urinating, dysuria, flank pain, frequency and hematuria. Dark urine     Musculoskeletal: Negative. Skin: Negative. Allergic/Immunologic: Negative. Neurological: Negative for dizziness, light-headedness and headaches. Hematological: Negative. Psychiatric/Behavioral: Negative. Objective:      /70   Pulse 80   Ht 5' 5\" (1.651 m)   Wt 174 lb (78.9 kg)   SpO2 97%   BMI 28.96 kg/m²     Physical Exam   Constitutional: He is oriented to person, place, and time. He appears well-developed and well-nourished. HENT:   Head: Normocephalic and atraumatic. Right Ear: External ear normal.   Left Ear: External ear normal.   Nose: Nose normal.   Eyes: Pupils are equal, round, and reactive to light. Conjunctivae and EOM are normal.   Neck: Normal range of motion. Cardiovascular: Normal rate, regular rhythm and normal heart sounds. Pulmonary/Chest: Effort normal and breath sounds normal.   Abdominal: Soft. Bowel sounds are normal.   Musculoskeletal: Normal range of motion. Neurological: He is alert and oriented to person, place, and time. Skin: Skin is warm and dry. Psychiatric: He has a normal mood and affect. His behavior is normal. Judgment and thought content normal.   Nursing note and vitals reviewed. Assessment & Plan:      1. Acute cystitis with hematuria-new problem    - Urinalysis Reflex to Culture; Future  - sulfamethoxazole-trimethoprim (BACTRIM DS) 800-160 MG per tablet;  Take 1 tablet by mouth 2 times daily  Dispense: 20

## 2018-12-01 LAB
CULTURE: ABNORMAL
Lab: ABNORMAL
ORGANISM: ABNORMAL
SPECIMEN DESCRIPTION: ABNORMAL
STATUS: ABNORMAL

## 2018-12-03 ENCOUNTER — TELEPHONE (OUTPATIENT)
Dept: FAMILY MEDICINE CLINIC | Age: 58
End: 2018-12-03

## 2018-12-03 RX ORDER — PHENAZOPYRIDINE HYDROCHLORIDE 100 MG/1
100 TABLET, FILM COATED ORAL 3 TIMES DAILY PRN
Qty: 30 TABLET | Refills: 0 | Status: SHIPPED | OUTPATIENT
Start: 2018-12-03 | End: 2018-12-14 | Stop reason: ALTCHOICE

## 2018-12-03 NOTE — TELEPHONE ENCOUNTER
The bactrim should be treating the infection but I would recommend pyridium to help with the pain. I will send it to the pharmacy.

## 2018-12-03 NOTE — TELEPHONE ENCOUNTER
Pt calling stating that he is still having burning with urination. Pt would like to know what he should do. Please call pt.

## 2018-12-14 ENCOUNTER — HOSPITAL ENCOUNTER (OUTPATIENT)
Age: 58
Setting detail: SPECIMEN
Discharge: HOME | End: 2018-12-14

## 2018-12-14 ENCOUNTER — OFFICE VISIT (OUTPATIENT)
Dept: FAMILY MEDICINE CLINIC | Age: 58
End: 2018-12-14

## 2018-12-14 VITALS
RESPIRATION RATE: 16 BRPM | BODY MASS INDEX: 28.66 KG/M2 | HEIGHT: 65 IN | SYSTOLIC BLOOD PRESSURE: 120 MMHG | DIASTOLIC BLOOD PRESSURE: 66 MMHG | OXYGEN SATURATION: 98 % | WEIGHT: 172 LBS | HEART RATE: 89 BPM

## 2018-12-14 DIAGNOSIS — R39.9 URINARY SYMPTOM OR SIGN: Primary | ICD-10-CM

## 2018-12-14 DIAGNOSIS — N52.9 ERECTILE DYSFUNCTION, UNSPECIFIED ERECTILE DYSFUNCTION TYPE: ICD-10-CM

## 2018-12-14 DIAGNOSIS — E11.9 TYPE 2 DIABETES MELLITUS WITHOUT COMPLICATION, WITHOUT LONG-TERM CURRENT USE OF INSULIN (HCC): ICD-10-CM

## 2018-12-14 DIAGNOSIS — E78.00 PURE HYPERCHOLESTEROLEMIA: ICD-10-CM

## 2018-12-14 DIAGNOSIS — F32.9 REACTIVE DEPRESSION: ICD-10-CM

## 2018-12-14 DIAGNOSIS — F41.9 ANXIETY: ICD-10-CM

## 2018-12-14 DIAGNOSIS — R39.9 URINARY SYMPTOM OR SIGN: ICD-10-CM

## 2018-12-14 DIAGNOSIS — Z23 NEED FOR SHINGLES VACCINE: ICD-10-CM

## 2018-12-14 DIAGNOSIS — I10 ESSENTIAL HYPERTENSION: ICD-10-CM

## 2018-12-14 LAB
-: NORMAL
AMORPHOUS: NORMAL
BACTERIA: NORMAL
BILIRUBIN URINE: NEGATIVE
CASTS UA: NORMAL /LPF (ref 0–2)
COLOR: ABNORMAL
COMMENT UA: ABNORMAL
CRYSTALS, UA: NORMAL /HPF
EPITHELIAL CELLS UA: NORMAL /HPF (ref 0–5)
GLUCOSE URINE: ABNORMAL
KETONES, URINE: NEGATIVE
LEUKOCYTE ESTERASE, URINE: NEGATIVE
MUCUS: NORMAL
NITRITE, URINE: NEGATIVE
OTHER OBSERVATIONS UA: NORMAL
PH UA: 6 (ref 5–6)
PROTEIN UA: NEGATIVE
RBC UA: NORMAL /HPF (ref 0–4)
RENAL EPITHELIAL, UA: NORMAL /HPF
SPECIFIC GRAVITY UA: 1.02 (ref 1.01–1.02)
TRICHOMONAS: NORMAL
TURBIDITY: ABNORMAL
URINE HGB: NEGATIVE
UROBILINOGEN, URINE: NORMAL
WBC UA: NORMAL /HPF (ref 0–4)
YEAST: NORMAL

## 2018-12-14 PROCEDURE — 99214 OFFICE O/P EST MOD 30 MIN: CPT | Performed by: PHYSICIAN ASSISTANT

## 2018-12-14 PROCEDURE — 81001 URINALYSIS AUTO W/SCOPE: CPT

## 2018-12-14 PROCEDURE — 87086 URINE CULTURE/COLONY COUNT: CPT

## 2018-12-14 ASSESSMENT — PATIENT HEALTH QUESTIONNAIRE - PHQ9
SUM OF ALL RESPONSES TO PHQ QUESTIONS 1-9: 0
1. LITTLE INTEREST OR PLEASURE IN DOING THINGS: 0
SUM OF ALL RESPONSES TO PHQ QUESTIONS 1-9: 0
2. FEELING DOWN, DEPRESSED OR HOPELESS: 0
SUM OF ALL RESPONSES TO PHQ9 QUESTIONS 1 & 2: 0

## 2018-12-14 ASSESSMENT — ENCOUNTER SYMPTOMS
GASTROINTESTINAL NEGATIVE: 1
RESPIRATORY NEGATIVE: 1

## 2018-12-14 NOTE — PROGRESS NOTES
is no rebound and no guarding. No hernia. Musculoskeletal: He exhibits no edema. Lymphadenopathy:     He has no cervical adenopathy. Neurological: He is alert and oriented to person, place, and time. Skin: Skin is warm and dry. No rash noted. No pallor. Psychiatric: He has a normal mood and affect. He has somewhat of a flat affect this is normal for patient   Nursing note and vitals reviewed. Hospital Outpatient Visit on 12/14/2018   Component Date Value Ref Range Status    Color, UA 12/14/2018 NOT REPORTED  YEL Final    Turbidity UA 12/14/2018 NOT REPORTED  CLEAR Final    Glucose, Ur 12/14/2018 TRACE* NEG Final    Bilirubin Urine 12/14/2018 NEGATIVE  NEG Final    Ketones, Urine 12/14/2018 NEGATIVE  NEG Final    Specific Gravity, UA 12/14/2018 1.025  1.010 - 1.025 Final    Urine Hgb 12/14/2018 NEGATIVE  NEG Final    pH, UA 12/14/2018 6.0  5.0 - 6.0 Final    Protein, UA 12/14/2018 NEGATIVE  NEG Final    Urobilinogen, Urine 12/14/2018 Normal  NORM Final    Nitrite, Urine 12/14/2018 NEGATIVE  NEG Final    Leukocyte Esterase, Urine 12/14/2018 NEGATIVE  NEG Final    Urinalysis Comments 12/14/2018 NOT REPORTED   Final    - 12/14/2018        Final    WBC, UA 12/14/2018 0 TO 4  0 - 4 /HPF Final    RBC, UA 12/14/2018 0 TO 4  0 - 4 /HPF Final    Casts UA 12/14/2018 NOT REPORTED  0 - 2 /LPF Final    Crystals UA 12/14/2018 NOT REPORTED  NONE /HPF Final    Epithelial Cells UA 12/14/2018 0 TO 4  0 - 5 /HPF Final    Renal Epithelial, Urine 12/14/2018 NOT REPORTED  0 /HPF Final    Bacteria, UA 12/14/2018 None  NONE Final    Mucus, UA 12/14/2018 NOT REPORTED  NONE Final    Trichomonas, UA 12/14/2018 NOT REPORTED  NONE Final    Amorphous, UA 12/14/2018 NOT REPORTED  NONE Final    Other Observations UA 12/14/2018 NOT REPORTED  NREQ Final    Yeast, UA 12/14/2018 NOT REPORTED  NONE Final    Specimen Description 12/14/2018 . CLEAN CATCH URINE   Final    Special Requests 12/14/2018 NOT REPORTED

## 2018-12-15 LAB
CULTURE: NO GROWTH
Lab: NORMAL
SPECIMEN DESCRIPTION: NORMAL
STATUS: NORMAL

## 2019-01-02 DIAGNOSIS — I10 ESSENTIAL HYPERTENSION, BENIGN: ICD-10-CM

## 2019-01-03 RX ORDER — ENALAPRIL MALEATE 20 MG/1
TABLET ORAL
Qty: 90 TABLET | Refills: 1 | Status: SHIPPED | OUTPATIENT
Start: 2019-01-03 | End: 2019-07-01 | Stop reason: SDUPTHER

## 2019-01-26 NOTE — PROGRESS NOTES
Pt to injection room for BP check. Rested 15 mins. Prior to reading. /84 pulse 74 pt took all medications today.     Retake:  136/84 no fever and no chills.

## 2019-02-08 ENCOUNTER — OFFICE VISIT (OUTPATIENT)
Dept: FAMILY MEDICINE CLINIC | Age: 59
End: 2019-02-08

## 2019-02-08 VITALS
SYSTOLIC BLOOD PRESSURE: 148 MMHG | HEIGHT: 65 IN | RESPIRATION RATE: 18 BRPM | OXYGEN SATURATION: 97 % | DIASTOLIC BLOOD PRESSURE: 88 MMHG | TEMPERATURE: 97.7 F | WEIGHT: 177 LBS | BODY MASS INDEX: 29.49 KG/M2 | HEART RATE: 105 BPM

## 2019-02-08 DIAGNOSIS — F33.9 RECURRENT MAJOR DEPRESSIVE DISORDER, REMISSION STATUS UNSPECIFIED (HCC): ICD-10-CM

## 2019-02-08 DIAGNOSIS — R51.9 NONINTRACTABLE EPISODIC HEADACHE, UNSPECIFIED HEADACHE TYPE: ICD-10-CM

## 2019-02-08 DIAGNOSIS — R35.1 NOCTURIA: ICD-10-CM

## 2019-02-08 DIAGNOSIS — N40.1 BENIGN PROSTATIC HYPERPLASIA WITH INCOMPLETE BLADDER EMPTYING: ICD-10-CM

## 2019-02-08 DIAGNOSIS — R39.14 BENIGN PROSTATIC HYPERPLASIA WITH INCOMPLETE BLADDER EMPTYING: ICD-10-CM

## 2019-02-08 DIAGNOSIS — R39.198 DIFFICULTY URINATING: ICD-10-CM

## 2019-02-08 DIAGNOSIS — R97.20 ABNORMAL PROSTATE SPECIFIC ANTIGEN: ICD-10-CM

## 2019-02-08 DIAGNOSIS — J30.1 SEASONAL ALLERGIC RHINITIS DUE TO POLLEN: Primary | ICD-10-CM

## 2019-02-08 PROCEDURE — 99213 OFFICE O/P EST LOW 20 MIN: CPT | Performed by: PHYSICIAN ASSISTANT

## 2019-02-08 RX ORDER — LORATADINE 10 MG/1
10 TABLET ORAL DAILY
Qty: 30 TABLET | Refills: 3 | Status: SHIPPED | OUTPATIENT
Start: 2019-02-08 | End: 2019-07-01 | Stop reason: SDUPTHER

## 2019-02-08 RX ORDER — TAMSULOSIN HYDROCHLORIDE 0.4 MG/1
0.4 CAPSULE ORAL DAILY
Qty: 30 CAPSULE | Refills: 5 | Status: SHIPPED | OUTPATIENT
Start: 2019-02-08 | End: 2019-08-12 | Stop reason: SDUPTHER

## 2019-02-08 RX ORDER — IBUPROFEN 800 MG/1
800 TABLET ORAL EVERY 8 HOURS PRN
Qty: 30 TABLET | Refills: 5 | Status: SHIPPED | OUTPATIENT
Start: 2019-02-08 | End: 2020-09-15 | Stop reason: ALTCHOICE

## 2019-02-08 ASSESSMENT — ENCOUNTER SYMPTOMS
RHINORRHEA: 1
GASTROINTESTINAL NEGATIVE: 1
SORE THROAT: 0
RESPIRATORY NEGATIVE: 1

## 2019-02-08 ASSESSMENT — PATIENT HEALTH QUESTIONNAIRE - PHQ9
SUM OF ALL RESPONSES TO PHQ QUESTIONS 1-9: 0
SUM OF ALL RESPONSES TO PHQ9 QUESTIONS 1 & 2: 0
2. FEELING DOWN, DEPRESSED OR HOPELESS: 0
SUM OF ALL RESPONSES TO PHQ QUESTIONS 1-9: 0
1. LITTLE INTEREST OR PLEASURE IN DOING THINGS: 0

## 2019-02-10 ASSESSMENT — ENCOUNTER SYMPTOMS
CONSTIPATION: 0
VOMITING: 0
WHEEZING: 0
NAUSEA: 0
CHEST TIGHTNESS: 0
DIARRHEA: 0
COUGH: 0

## 2019-03-15 ENCOUNTER — HOSPITAL ENCOUNTER (OUTPATIENT)
Dept: LAB | Age: 59
Discharge: HOME | End: 2019-03-15

## 2019-03-15 DIAGNOSIS — R39.198 DIFFICULTY URINATING: ICD-10-CM

## 2019-03-15 DIAGNOSIS — N40.1 BENIGN PROSTATIC HYPERPLASIA WITH INCOMPLETE BLADDER EMPTYING: ICD-10-CM

## 2019-03-15 DIAGNOSIS — R97.20 ABNORMAL PROSTATE SPECIFIC ANTIGEN: ICD-10-CM

## 2019-03-15 DIAGNOSIS — E11.9 TYPE 2 DIABETES MELLITUS WITHOUT COMPLICATION, WITHOUT LONG-TERM CURRENT USE OF INSULIN (HCC): ICD-10-CM

## 2019-03-15 DIAGNOSIS — R39.14 BENIGN PROSTATIC HYPERPLASIA WITH INCOMPLETE BLADDER EMPTYING: ICD-10-CM

## 2019-03-15 DIAGNOSIS — N30.01 ACUTE CYSTITIS WITH HEMATURIA: ICD-10-CM

## 2019-03-15 DIAGNOSIS — R82.998 DARK URINE: ICD-10-CM

## 2019-03-15 DIAGNOSIS — R31.9 HEMATURIA, UNSPECIFIED TYPE: ICD-10-CM

## 2019-03-15 DIAGNOSIS — R35.1 NOCTURIA: ICD-10-CM

## 2019-03-15 LAB
-: NORMAL
ALBUMIN SERPL-MCNC: 4.4 G/DL (ref 3.5–5.2)
ALBUMIN/GLOBULIN RATIO: 1.9 (ref 1–2.5)
ALP BLD-CCNC: 71 U/L (ref 40–129)
ALT SERPL-CCNC: 32 U/L (ref 5–41)
AMORPHOUS: NORMAL
ANION GAP SERPL CALCULATED.3IONS-SCNC: 13 MMOL/L (ref 9–17)
AST SERPL-CCNC: 21 U/L
BACTERIA: NORMAL
BILIRUB SERPL-MCNC: 0.51 MG/DL (ref 0.3–1.2)
BILIRUBIN URINE: NEGATIVE
BUN BLDV-MCNC: 17 MG/DL (ref 6–20)
BUN/CREAT BLD: 19 (ref 9–20)
CALCIUM SERPL-MCNC: 9.2 MG/DL (ref 8.6–10.4)
CASTS UA: NORMAL /LPF (ref 0–2)
CHLORIDE BLD-SCNC: 100 MMOL/L (ref 98–107)
CO2: 28 MMOL/L (ref 20–31)
COLOR: ABNORMAL
COMMENT UA: ABNORMAL
CREAT SERPL-MCNC: 0.91 MG/DL (ref 0.7–1.2)
CREATININE URINE: 88.8 MG/DL (ref 39–259)
CRYSTALS, UA: NORMAL /HPF
EPITHELIAL CELLS UA: NORMAL /HPF (ref 0–5)
ESTIMATED AVERAGE GLUCOSE: 108 MG/DL
GFR AFRICAN AMERICAN: >60 ML/MIN
GFR NON-AFRICAN AMERICAN: >60 ML/MIN
GFR SERPL CREATININE-BSD FRML MDRD: ABNORMAL ML/MIN/{1.73_M2}
GFR SERPL CREATININE-BSD FRML MDRD: ABNORMAL ML/MIN/{1.73_M2}
GLUCOSE BLD-MCNC: 126 MG/DL (ref 70–99)
GLUCOSE URINE: NEGATIVE
HBA1C MFR BLD: 5.4 % (ref 4.8–5.9)
KETONES, URINE: NEGATIVE
LEUKOCYTE ESTERASE, URINE: NEGATIVE
MICROALBUMIN/CREAT 24H UR: <12 MG/L
MICROALBUMIN/CREAT UR-RTO: NORMAL MCG/MG CREAT
MUCUS: NORMAL
NITRITE, URINE: NEGATIVE
OTHER OBSERVATIONS UA: NORMAL
PH UA: 6 (ref 5–6)
POTASSIUM SERPL-SCNC: 3.8 MMOL/L (ref 3.7–5.3)
PROSTATE SPECIFIC ANTIGEN: 6.32 UG/L
PROTEIN UA: NEGATIVE
RBC UA: NORMAL /HPF (ref 0–4)
RENAL EPITHELIAL, UA: NORMAL /HPF
SODIUM BLD-SCNC: 141 MMOL/L (ref 135–144)
SPECIFIC GRAVITY UA: 1.01 (ref 1.01–1.02)
TOTAL PROTEIN: 6.7 G/DL (ref 6.4–8.3)
TRICHOMONAS: NORMAL
TURBIDITY: ABNORMAL
URINE HGB: ABNORMAL
UROBILINOGEN, URINE: NORMAL
WBC UA: NORMAL /HPF (ref 0–4)
YEAST: NORMAL

## 2019-03-15 PROCEDURE — 83036 HEMOGLOBIN GLYCOSYLATED A1C: CPT

## 2019-03-15 PROCEDURE — G0103 PSA SCREENING: HCPCS

## 2019-03-15 PROCEDURE — 36415 COLL VENOUS BLD VENIPUNCTURE: CPT

## 2019-03-15 PROCEDURE — 82043 UR ALBUMIN QUANTITATIVE: CPT

## 2019-03-15 PROCEDURE — 82570 ASSAY OF URINE CREATININE: CPT

## 2019-03-15 PROCEDURE — 81001 URINALYSIS AUTO W/SCOPE: CPT

## 2019-03-15 PROCEDURE — 80053 COMPREHEN METABOLIC PANEL: CPT

## 2019-03-18 DIAGNOSIS — R97.20 ELEVATED PSA: Primary | ICD-10-CM

## 2019-03-26 ENCOUNTER — OFFICE VISIT (OUTPATIENT)
Dept: UROLOGY | Age: 59
End: 2019-03-26

## 2019-03-26 VITALS
BODY MASS INDEX: 29.59 KG/M2 | WEIGHT: 177.6 LBS | HEIGHT: 65 IN | SYSTOLIC BLOOD PRESSURE: 130 MMHG | HEART RATE: 73 BPM | DIASTOLIC BLOOD PRESSURE: 92 MMHG | OXYGEN SATURATION: 97 %

## 2019-03-26 DIAGNOSIS — R97.20 ELEVATED PSA: Primary | ICD-10-CM

## 2019-03-26 DIAGNOSIS — N40.1 BENIGN LOCALIZED PROSTATIC HYPERPLASIA WITH LOWER URINARY TRACT SYMPTOMS (LUTS): ICD-10-CM

## 2019-03-26 DIAGNOSIS — N52.9 ERECTILE DYSFUNCTION, UNSPECIFIED ERECTILE DYSFUNCTION TYPE: ICD-10-CM

## 2019-03-26 PROCEDURE — 99204 OFFICE O/P NEW MOD 45 MIN: CPT | Performed by: UROLOGY

## 2019-04-11 ENCOUNTER — OFFICE VISIT (OUTPATIENT)
Dept: PRIMARY CARE CLINIC | Age: 59
End: 2019-04-11

## 2019-04-11 ENCOUNTER — HOSPITAL ENCOUNTER (OUTPATIENT)
Age: 59
Setting detail: SPECIMEN
Discharge: HOME | End: 2019-04-11

## 2019-04-11 VITALS
TEMPERATURE: 100.1 F | BODY MASS INDEX: 28.96 KG/M2 | HEART RATE: 80 BPM | DIASTOLIC BLOOD PRESSURE: 64 MMHG | WEIGHT: 174 LBS | OXYGEN SATURATION: 99 % | SYSTOLIC BLOOD PRESSURE: 120 MMHG

## 2019-04-11 DIAGNOSIS — R53.83 FATIGUE, UNSPECIFIED TYPE: Primary | ICD-10-CM

## 2019-04-11 DIAGNOSIS — R50.9 LOW GRADE FEVER: ICD-10-CM

## 2019-04-11 DIAGNOSIS — N30.01 ACUTE CYSTITIS WITH HEMATURIA: ICD-10-CM

## 2019-04-11 DIAGNOSIS — R81 GLUCOSE FOUND IN URINE ON EXAMINATION: ICD-10-CM

## 2019-04-11 DIAGNOSIS — R53.83 FATIGUE, UNSPECIFIED TYPE: ICD-10-CM

## 2019-04-11 LAB
-: ABNORMAL
AMORPHOUS: ABNORMAL
BACTERIA: ABNORMAL
BILIRUBIN URINE: ABNORMAL
CASTS UA: >50 /LPF (ref 0–2)
CASTS UA: ABNORMAL /LPF (ref 0–2)
CHP ED QC CHECK: ABNORMAL
COLOR: ABNORMAL
COMMENT UA: ABNORMAL
CRYSTALS, UA: ABNORMAL /HPF
EPITHELIAL CELLS UA: ABNORMAL /HPF (ref 0–5)
GLUCOSE BLD-MCNC: 256 MG/DL
GLUCOSE URINE: ABNORMAL
KETONES, URINE: ABNORMAL
LEUKOCYTE ESTERASE, URINE: ABNORMAL
MUCUS: ABNORMAL
NITRITE, URINE: NEGATIVE
OTHER OBSERVATIONS UA: ABNORMAL
PH UA: 5.5 (ref 5–6)
PROTEIN UA: ABNORMAL
RBC UA: ABNORMAL /HPF (ref 0–4)
RENAL EPITHELIAL, UA: ABNORMAL /HPF
SPECIFIC GRAVITY UA: 1.03 (ref 1.01–1.02)
TRICHOMONAS: ABNORMAL
TURBIDITY: ABNORMAL
URINE HGB: ABNORMAL
UROBILINOGEN, URINE: NORMAL
WBC UA: ABNORMAL /HPF (ref 0–4)
YEAST: ABNORMAL

## 2019-04-11 PROCEDURE — 81001 URINALYSIS AUTO W/SCOPE: CPT

## 2019-04-11 PROCEDURE — 87086 URINE CULTURE/COLONY COUNT: CPT

## 2019-04-11 PROCEDURE — 82962 GLUCOSE BLOOD TEST: CPT | Performed by: FAMILY MEDICINE

## 2019-04-11 PROCEDURE — 99213 OFFICE O/P EST LOW 20 MIN: CPT | Performed by: FAMILY MEDICINE

## 2019-04-11 RX ORDER — NITROFURANTOIN 25; 75 MG/1; MG/1
100 CAPSULE ORAL 2 TIMES DAILY
Qty: 14 CAPSULE | Refills: 0 | Status: SHIPPED | OUTPATIENT
Start: 2019-04-11 | End: 2019-04-18

## 2019-04-11 ASSESSMENT — ENCOUNTER SYMPTOMS
GASTROINTESTINAL NEGATIVE: 1
EYES NEGATIVE: 1
RHINORRHEA: 0
ABDOMINAL PAIN: 0
ALLERGIC/IMMUNOLOGIC NEGATIVE: 1
COUGH: 1
SORE THROAT: 1

## 2019-04-11 NOTE — PROGRESS NOTES
2019     Anh Lawrence (:  1960) is a 60 y.o. male, here for evaluation of the following medical concerns:    HPI   Acute urgent care visit for fatigue. He slept all day yesterday. Got up and walked a little bit but continued to feel tired and nap more then usual.  Getting up a lot overnight with urination every hour or so. Feeling a little weak and off balance this am.  Still feeling excessive fatigue. Minimal cough and sore throat. No rhinorrhea or congestion. He doesn't really endorse a lot of dysuria. Not checking blood sugar at home. Past Medical History:   Diagnosis Date    Anxiety     Depression     Erectile dysfunction     Fatigue     Hyperlipidemia     Hypertension     Impaired fasting glucose     Memory problem     Sleep disorder     Tremor      Past Surgical History:   Procedure Laterality Date    OTHER SURGICAL HISTORY      lipoma removed from neck         Review of Systems   Constitutional: Positive for fatigue and fever. HENT: Positive for sore throat. Negative for congestion and rhinorrhea. Eyes: Negative. Respiratory: Positive for cough. Cardiovascular: Negative. Gastrointestinal: Negative. Negative for abdominal pain. Endocrine: Negative. Genitourinary: Positive for dysuria and frequency. Negative for urgency. Musculoskeletal: Negative. Skin: Negative. Allergic/Immunologic: Negative. Neurological: Positive for weakness (generalized). Hematological: Negative. Psychiatric/Behavioral: Positive for sleep disturbance. Prior to Visit Medications    Medication Sig Taking?  Authorizing Provider   metFORMIN (GLUCOPHAGE) 500 MG tablet TAKE 1 TABLET BY MOUTH TWICE DAILY WITH MEALS Yes LILA Vera   loratadine (CLARITIN) 10 MG tablet Take 1 tablet by mouth daily Yes LILA Singer   ibuprofen (ADVIL;MOTRIN) 800 MG tablet Take 1 tablet by mouth every 8 hours as needed for Pain Yes LILA Singer tamsulosin (FLOMAX) 0.4 MG capsule Take 1 capsule by mouth daily Yes LILA Rivera   enalapril (VASOTEC) 20 MG tablet TAKE 1 TABLET BY MOUTH ONCE DAILY Yes LILA Rivera   glimepiride (AMARYL) 2 MG tablet TAKE ONE TABLET BY MOUTH ONCE DAILY IN THE MORNING Yes LILA Phelsp   ammonium lactate (LAC-HYDRIN) 12 % lotion Apply topically daily. Yes REBEKAH Hoover CNP   glucose blood VI test strips (ASCENSIA AUTODISC VI;ONE TOUCH ULTRA TEST VI) strip 1 each by In Vitro route daily As needed. Yes LILA Rivera        Social History     Tobacco Use    Smoking status: Former Smoker     Packs/day: 2.00     Years: 20.00     Pack years: 40.00     Start date: 6/15/1986     Last attempt to quit: 6/15/1995     Years since quittin.8    Smokeless tobacco: Never Used    Tobacco comment: quit over 25 years ago   Substance Use Topics    Alcohol use: No     Comment: quit 12 years ago        Vitals:    19 1014   BP: 120/64   Site: Left Upper Arm   Position: Sitting   Cuff Size: Large Adult   Pulse: 80   Temp: 100.1 °F (37.8 °C)   TempSrc: Tympanic   SpO2: 99%   Weight: 174 lb (78.9 kg)     Estimated body mass index is 28.96 kg/m² as calculated from the following:    Height as of 3/26/19: 5' 5\" (1.651 m). Weight as of this encounter: 174 lb (78.9 kg). Physical Exam   Constitutional: He appears well-developed and well-nourished. No distress. HENT:   Head: Normocephalic. Eyes: Pupils are equal, round, and reactive to light. Neck: Normal range of motion. No thyromegaly present. Cardiovascular: Normal rate. Pulmonary/Chest: Effort normal and breath sounds normal. No respiratory distress. Abdominal: Soft. Bowel sounds are normal. He exhibits no distension. There is no tenderness. There is no guarding. Musculoskeletal: Normal range of motion. Neurological: He is alert. Skin: Skin is warm. No rash noted.    /64 (Site: Left Upper Arm, Position:

## 2019-04-12 LAB
CULTURE: NORMAL
Lab: NORMAL
SPECIMEN DESCRIPTION: NORMAL

## 2019-05-13 RX ORDER — GLIMEPIRIDE 2 MG/1
TABLET ORAL
Qty: 90 TABLET | Refills: 1 | Status: SHIPPED | OUTPATIENT
Start: 2019-05-13 | End: 2019-11-09 | Stop reason: SDUPTHER

## 2019-05-22 ENCOUNTER — HOSPITAL ENCOUNTER (OUTPATIENT)
Dept: LAB | Age: 59
Discharge: HOME | End: 2019-05-22

## 2019-05-22 ENCOUNTER — OFFICE VISIT (OUTPATIENT)
Dept: FAMILY MEDICINE CLINIC | Age: 59
End: 2019-05-22

## 2019-05-22 VITALS
TEMPERATURE: 99.4 F | DIASTOLIC BLOOD PRESSURE: 74 MMHG | SYSTOLIC BLOOD PRESSURE: 132 MMHG | BODY MASS INDEX: 28.99 KG/M2 | HEART RATE: 88 BPM | HEIGHT: 65 IN | WEIGHT: 174 LBS

## 2019-05-22 DIAGNOSIS — R30.0 DYSURIA: Primary | ICD-10-CM

## 2019-05-22 DIAGNOSIS — R30.0 DYSURIA: ICD-10-CM

## 2019-05-22 LAB
-: ABNORMAL
AMORPHOUS: ABNORMAL
BACTERIA: ABNORMAL
BILIRUBIN URINE: NEGATIVE
CASTS UA: ABNORMAL /LPF (ref 0–2)
COLOR: ABNORMAL
COMMENT UA: ABNORMAL
CRYSTALS, UA: ABNORMAL /HPF
EPITHELIAL CELLS UA: ABNORMAL /HPF (ref 0–5)
GLUCOSE URINE: ABNORMAL
KETONES, URINE: NEGATIVE
LEUKOCYTE ESTERASE, URINE: ABNORMAL
MUCUS: ABNORMAL
NITRITE, URINE: NEGATIVE
OTHER OBSERVATIONS UA: ABNORMAL
PH UA: 6 (ref 5–6)
PROTEIN UA: ABNORMAL
RBC UA: >50 /HPF (ref 0–4)
RENAL EPITHELIAL, UA: ABNORMAL /HPF
SPECIFIC GRAVITY UA: 1.02 (ref 1.01–1.02)
TRICHOMONAS: ABNORMAL
TURBIDITY: ABNORMAL
URINE HGB: ABNORMAL
UROBILINOGEN, URINE: NORMAL
WBC UA: >50 /HPF (ref 0–4)
YEAST: ABNORMAL

## 2019-05-22 PROCEDURE — 87086 URINE CULTURE/COLONY COUNT: CPT

## 2019-05-22 PROCEDURE — 81001 URINALYSIS AUTO W/SCOPE: CPT

## 2019-05-22 PROCEDURE — 99213 OFFICE O/P EST LOW 20 MIN: CPT | Performed by: NURSE PRACTITIONER

## 2019-05-22 PROCEDURE — 87186 SC STD MICRODIL/AGAR DIL: CPT

## 2019-05-22 PROCEDURE — 87088 URINE BACTERIA CULTURE: CPT

## 2019-05-22 RX ORDER — NITROFURANTOIN MACROCRYSTALS 100 MG/1
100 CAPSULE ORAL 2 TIMES DAILY
Qty: 20 CAPSULE | Refills: 0 | Status: SHIPPED | OUTPATIENT
Start: 2019-05-22 | End: 2019-06-01

## 2019-05-22 ASSESSMENT — ENCOUNTER SYMPTOMS
NAUSEA: 0
VOMITING: 0

## 2019-05-22 NOTE — PROGRESS NOTES
Subjective:      Patient ID: Sam Johnson is a 60 y.o. male. Dysuria    This is a recurrent problem. The current episode started today. The problem occurs every urination. The problem has been gradually worsening. The quality of the pain is described as burning. The pain is at a severity of 7/10. The pain is severe. There has been no fever. Associated symptoms include frequency, hematuria, hesitancy and urgency. Pertinent negatives include no chills, discharge, flank pain, nausea, possible pregnancy, sweats or vomiting. He has tried nothing for the symptoms. PMH: elevated PSA     He has an empty bottle of Macrodantin from his treatment for similar symptoms in mid April. He would like to have it refilled as it worked well previously. Suggested a urology appointment as well, due to recurrent UTI. He declines as he says \"I don't want any surgery to tell me I have cancer because I don't have it and they almost killed me with the past surgery. \"        Review of Systems   Constitutional: Negative for chills. Gastrointestinal: Negative for nausea and vomiting. Genitourinary: Positive for decreased urine volume, difficulty urinating, dysuria, frequency, hematuria, hesitancy and urgency. Negative for flank pain. All other systems reviewed and are negative. Objective:   Physical Exam   Constitutional: He is oriented to person, place, and time. He appears well-developed and well-nourished. No distress. HENT:   Head: Normocephalic and atraumatic. Cardiovascular: Normal rate and regular rhythm. Pulmonary/Chest: Effort normal and breath sounds normal.   Abdominal: Soft. Bowel sounds are normal. There is no CVA tenderness. Neurological: He is alert and oriented to person, place, and time. Skin: Skin is warm and dry. He is not diaphoretic. Psychiatric: He has a normal mood and affect. His behavior is normal. Judgment and thought content normal.     Assessment/Plan:      1.  Dysuria    - nitrofurantoin

## 2019-05-24 LAB
CULTURE: ABNORMAL
Lab: ABNORMAL
SPECIMEN DESCRIPTION: ABNORMAL

## 2019-07-01 DIAGNOSIS — I10 ESSENTIAL HYPERTENSION, BENIGN: ICD-10-CM

## 2019-07-01 DIAGNOSIS — J30.1 SEASONAL ALLERGIC RHINITIS DUE TO POLLEN: ICD-10-CM

## 2019-07-01 RX ORDER — BENZOYL PEROXIDE
KIT TOPICAL
Qty: 30 TABLET | Refills: 3 | Status: SHIPPED | OUTPATIENT
Start: 2019-07-01 | End: 2019-11-09 | Stop reason: SDUPTHER

## 2019-07-01 RX ORDER — ENALAPRIL MALEATE 20 MG/1
TABLET ORAL
Qty: 90 TABLET | Refills: 1 | Status: SHIPPED | OUTPATIENT
Start: 2019-07-01 | End: 2020-01-06

## 2019-08-05 ENCOUNTER — HOSPITAL ENCOUNTER (OUTPATIENT)
Dept: LAB | Age: 59
Discharge: HOME | End: 2019-08-05

## 2019-08-05 DIAGNOSIS — E78.5 HYPERLIPIDEMIA, UNSPECIFIED HYPERLIPIDEMIA TYPE: ICD-10-CM

## 2019-08-05 DIAGNOSIS — R73.01 IMPAIRED FASTING GLUCOSE: ICD-10-CM

## 2019-08-05 DIAGNOSIS — R97.20 ELEVATED PSA: ICD-10-CM

## 2019-08-05 DIAGNOSIS — I15.9 SECONDARY HYPERTENSION: ICD-10-CM

## 2019-08-05 DIAGNOSIS — R39.14 BENIGN PROSTATIC HYPERPLASIA WITH INCOMPLETE BLADDER EMPTYING: ICD-10-CM

## 2019-08-05 DIAGNOSIS — Z12.5 SCREENING FOR PROSTATE CANCER: ICD-10-CM

## 2019-08-05 DIAGNOSIS — I15.9 SECONDARY HYPERTENSION: Primary | ICD-10-CM

## 2019-08-05 DIAGNOSIS — N52.9 ERECTILE DYSFUNCTION, UNSPECIFIED ERECTILE DYSFUNCTION TYPE: ICD-10-CM

## 2019-08-05 DIAGNOSIS — N40.1 BENIGN PROSTATIC HYPERPLASIA WITH INCOMPLETE BLADDER EMPTYING: ICD-10-CM

## 2019-08-05 DIAGNOSIS — R30.0 DYSURIA: ICD-10-CM

## 2019-08-05 LAB
-: ABNORMAL
ALBUMIN SERPL-MCNC: 4.7 G/DL (ref 3.5–5.2)
ALBUMIN/GLOBULIN RATIO: 2 (ref 1–2.5)
ALP BLD-CCNC: 75 U/L (ref 40–129)
ALT SERPL-CCNC: 40 U/L (ref 5–41)
AMORPHOUS: ABNORMAL
ANION GAP SERPL CALCULATED.3IONS-SCNC: 10 MMOL/L (ref 9–17)
AST SERPL-CCNC: 24 U/L
BACTERIA: ABNORMAL
BILIRUB SERPL-MCNC: 0.59 MG/DL (ref 0.3–1.2)
BILIRUBIN URINE: NEGATIVE
BUN BLDV-MCNC: 15 MG/DL (ref 8–23)
BUN/CREAT BLD: 16 (ref 9–20)
CALCIUM SERPL-MCNC: 9.6 MG/DL (ref 8.6–10.4)
CASTS UA: ABNORMAL /LPF (ref 0–2)
CHLORIDE BLD-SCNC: 101 MMOL/L (ref 98–107)
CHOLESTEROL/HDL RATIO: 3.8
CHOLESTEROL: 165 MG/DL
CO2: 26 MMOL/L (ref 20–31)
COLOR: ABNORMAL
COMMENT UA: ABNORMAL
CREAT SERPL-MCNC: 0.91 MG/DL (ref 0.7–1.2)
CRYSTALS, UA: ABNORMAL /HPF
EPITHELIAL CELLS UA: ABNORMAL /HPF (ref 0–5)
ESTIMATED AVERAGE GLUCOSE: 111 MG/DL
GFR AFRICAN AMERICAN: >60 ML/MIN
GFR NON-AFRICAN AMERICAN: >60 ML/MIN
GFR SERPL CREATININE-BSD FRML MDRD: ABNORMAL ML/MIN/{1.73_M2}
GFR SERPL CREATININE-BSD FRML MDRD: ABNORMAL ML/MIN/{1.73_M2}
GLUCOSE BLD-MCNC: 161 MG/DL (ref 70–99)
GLUCOSE URINE: NEGATIVE
HBA1C MFR BLD: 5.5 % (ref 4.8–5.9)
HDLC SERPL-MCNC: 43 MG/DL
KETONES, URINE: NEGATIVE
LDL CHOLESTEROL: 105 MG/DL (ref 0–130)
LEUKOCYTE ESTERASE, URINE: ABNORMAL
MUCUS: ABNORMAL
NITRITE, URINE: NEGATIVE
OTHER OBSERVATIONS UA: ABNORMAL
PH UA: 6 (ref 5–6)
POTASSIUM SERPL-SCNC: 4.4 MMOL/L (ref 3.7–5.3)
PROSTATE SPECIFIC ANTIGEN: 9.26 UG/L
PROTEIN UA: NEGATIVE
RBC UA: ABNORMAL /HPF (ref 0–4)
RENAL EPITHELIAL, UA: ABNORMAL /HPF
SODIUM BLD-SCNC: 137 MMOL/L (ref 135–144)
SPECIFIC GRAVITY UA: 1.02 (ref 1.01–1.02)
TOTAL PROTEIN: 7.1 G/DL (ref 6.4–8.3)
TRICHOMONAS: ABNORMAL
TRIGL SERPL-MCNC: 86 MG/DL
TURBIDITY: ABNORMAL
URINE HGB: NEGATIVE
UROBILINOGEN, URINE: NORMAL
VLDLC SERPL CALC-MCNC: NORMAL MG/DL (ref 1–30)
WBC UA: ABNORMAL /HPF (ref 0–4)
YEAST: ABNORMAL

## 2019-08-05 PROCEDURE — 36415 COLL VENOUS BLD VENIPUNCTURE: CPT

## 2019-08-05 PROCEDURE — 81001 URINALYSIS AUTO W/SCOPE: CPT

## 2019-08-05 PROCEDURE — 80053 COMPREHEN METABOLIC PANEL: CPT

## 2019-08-05 PROCEDURE — 83036 HEMOGLOBIN GLYCOSYLATED A1C: CPT

## 2019-08-05 PROCEDURE — 80061 LIPID PANEL: CPT

## 2019-08-05 PROCEDURE — G0103 PSA SCREENING: HCPCS

## 2019-08-09 ENCOUNTER — OFFICE VISIT (OUTPATIENT)
Dept: FAMILY MEDICINE CLINIC | Age: 59
End: 2019-08-09

## 2019-08-09 VITALS
OXYGEN SATURATION: 98 % | DIASTOLIC BLOOD PRESSURE: 62 MMHG | RESPIRATION RATE: 12 BRPM | HEIGHT: 65 IN | BODY MASS INDEX: 28.82 KG/M2 | SYSTOLIC BLOOD PRESSURE: 126 MMHG | WEIGHT: 173 LBS | HEART RATE: 98 BPM

## 2019-08-09 DIAGNOSIS — I10 ESSENTIAL HYPERTENSION: ICD-10-CM

## 2019-08-09 DIAGNOSIS — Z00.00 ROUTINE GENERAL MEDICAL EXAMINATION AT A HEALTH CARE FACILITY: Primary | ICD-10-CM

## 2019-08-09 DIAGNOSIS — Z63.9 FAMILY DYSFUNCTION: ICD-10-CM

## 2019-08-09 DIAGNOSIS — E78.2 MIXED HYPERLIPIDEMIA: ICD-10-CM

## 2019-08-09 DIAGNOSIS — Z23 NEED FOR VACCINATION: ICD-10-CM

## 2019-08-09 DIAGNOSIS — N52.9 ERECTILE DYSFUNCTION, UNSPECIFIED ERECTILE DYSFUNCTION TYPE: ICD-10-CM

## 2019-08-09 DIAGNOSIS — R97.20 ELEVATED PSA: ICD-10-CM

## 2019-08-09 DIAGNOSIS — E11.59 TYPE 2 DIABETES MELLITUS WITH OTHER CIRCULATORY COMPLICATION, WITHOUT LONG-TERM CURRENT USE OF INSULIN (HCC): ICD-10-CM

## 2019-08-09 PROCEDURE — G0438 PPPS, INITIAL VISIT: HCPCS | Performed by: PHYSICIAN ASSISTANT

## 2019-08-09 PROCEDURE — 90732 PPSV23 VACC 2 YRS+ SUBQ/IM: CPT | Performed by: PHYSICIAN ASSISTANT

## 2019-08-09 PROCEDURE — G0009 ADMIN PNEUMOCOCCAL VACCINE: HCPCS | Performed by: PHYSICIAN ASSISTANT

## 2019-08-09 SDOH — SOCIAL STABILITY - SOCIAL INSECURITY: PROBLEM RELATED TO PRIMARY SUPPORT GROUP, UNSPECIFIED: Z63.9

## 2019-08-09 ASSESSMENT — PATIENT HEALTH QUESTIONNAIRE - PHQ9
SUM OF ALL RESPONSES TO PHQ QUESTIONS 1-9: 0
SUM OF ALL RESPONSES TO PHQ QUESTIONS 1-9: 0
2. FEELING DOWN, DEPRESSED OR HOPELESS: 0
SUM OF ALL RESPONSES TO PHQ9 QUESTIONS 1 & 2: 0
1. LITTLE INTEREST OR PLEASURE IN DOING THINGS: 0

## 2019-08-09 ASSESSMENT — ENCOUNTER SYMPTOMS
VOMITING: 0
GASTROINTESTINAL NEGATIVE: 1
DIARRHEA: 0
RESPIRATORY NEGATIVE: 1
NAUSEA: 0

## 2019-08-09 NOTE — PATIENT INSTRUCTIONS
Personalized Preventive Plan for Varun Landon - 8/9/2019  Medicare offers a range of preventive health benefits. Some of the tests and screenings are paid in full while other may be subject to a deductible, co-insurance, and/or copay. Some of these benefits include a comprehensive review of your medical history including lifestyle, illnesses that may run in your family, and various assessments and screenings as appropriate. After reviewing your medical record and screening and assessments performed today your provider may have ordered immunizations, labs, imaging, and/or referrals for you. A list of these orders (if applicable) as well as your Preventive Care list are included within your After Visit Summary for your review. Other Preventive Recommendations:    · A preventive eye exam performed by an eye specialist is recommended every 1-2 years to screen for glaucoma; cataracts, macular degeneration, and other eye disorders. · A preventive dental visit is recommended every 6 months. · Try to get at least 150 minutes of exercise per week or 10,000 steps per day on a pedometer . · Order or download the FREE \"Exercise & Physical Activity: Your Everyday Guide\" from [employer]. Call 8-878.811.1449 or search [employer] online. · You need 7500-3962 mg of calcium and 2957-5862 IU of vitamin D per day. It is possible to meet your calcium requirement with diet alone, but a vitamin D supplement is usually necessary to meet this goal.  · When exposed to the sun, use a sunscreen that protects against both UVA and UVB radiation with an SPF of 30 or greater. Reapply every 2 to 3 hours or after sweating, drying off with a towel, or swimming. · Always wear a seat belt when traveling in a car. Always wear a helmet when riding a bicycle or motorcycle.

## 2019-08-09 NOTE — PROGRESS NOTES
of stopping and starting of stream.  No dribbling. No incontinence. No hematuria. Review of Systems   Constitutional: Negative for appetite change, chills, fatigue and fever. Still walking. HENT: Negative. Eyes:        STACIE 2015 with Dr Shakira Dominguez. Wears glasses. Respiratory: Negative. Cardiovascular: Negative for chest pain and palpitations. Gastrointestinal: Negative. Negative for diarrhea, nausea and vomiting. Genitourinary: Positive for decreased urine volume and difficulty urinating. Negative for dysuria and frequency. Musculoskeletal: Negative for myalgias. Sometimes toes tingle. Skin: Negative for rash. Neurological: Negative for dizziness, weakness, light-headedness, numbness and headaches. Psychiatric/Behavioral: Negative for self-injury, sleep disturbance and suicidal ideas. The patient is not nervous/anxious. Objective:   Physical Exam   Constitutional: He is oriented to person, place, and time. He appears well-developed and well-nourished. No distress. HENT:   Head: Normocephalic and atraumatic. Nose: Nose normal.   Mouth/Throat: Oropharynx is clear and moist. No oropharyngeal exudate. Eyes: Conjunctivae are normal. No scleral icterus. Neck: Normal range of motion. Neck supple. No thyromegaly present. Thyroid nontender to palpable. No carotid bruits noted. Cardiovascular: Normal rate, regular rhythm, normal heart sounds and intact distal pulses. Exam reveals no gallop and no friction rub. No murmur heard. Pulmonary/Chest: Effort normal and breath sounds normal. He has no wheezes. He has no rales. Abdominal: Soft. Bowel sounds are normal. He exhibits no distension and no mass. There is no tenderness. There is no rebound and no guarding. No hernia. Musculoskeletal: He exhibits no edema or tenderness. Diabetic foot exam was completed. No sores or wounds noted on the feet. Skin is soft.   A few calluses noted on the great toes medially. No open wounds. Distal pulses intact. Sensation intact to filament touch and vibratory sense in both feet bilaterally. A normal gait. Sensation and strength normal lower extremities bilaterally. Lymphadenopathy:     He has no cervical adenopathy. Neurological: He is alert and oriented to person, place, and time. He exhibits normal muscle tone. Normal strength and sensation upper and lower extremities bilaterally. No tremors noted. Normal gait. Skin: Skin is warm and dry. Capillary refill takes less than 2 seconds. No rash noted. No erythema. No pallor. Psychiatric: He has a normal mood and affect. Mood and affect normal for patient somewhat flat and defensive. Speech is normal.  He is coherent and good eye contact. Nursing note and vitals reviewed.     Hospital Outpatient Visit on 08/05/2019   Component Date Value Ref Range Status    Glucose 08/05/2019 161* 70 - 99 mg/dL Final    BUN 08/05/2019 15  8 - 23 mg/dL Final    CREATININE 08/05/2019 0.91  0.70 - 1.20 mg/dL Final    Bun/Cre Ratio 08/05/2019 16  9 - 20 Final    Calcium 08/05/2019 9.6  8.6 - 10.4 mg/dL Final    Sodium 08/05/2019 137  135 - 144 mmol/L Final    Potassium 08/05/2019 4.4  3.7 - 5.3 mmol/L Final    Chloride 08/05/2019 101  98 - 107 mmol/L Final    CO2 08/05/2019 26  20 - 31 mmol/L Final    Anion Gap 08/05/2019 10  9 - 17 mmol/L Final    Alkaline Phosphatase 08/05/2019 75  40 - 129 U/L Final    ALT 08/05/2019 40  5 - 41 U/L Final    AST 08/05/2019 24  <40 U/L Final    Total Bilirubin 08/05/2019 0.59  0.3 - 1.2 mg/dL Final    Total Protein 08/05/2019 7.1  6.4 - 8.3 g/dL Final    Alb 08/05/2019 4.7  3.5 - 5.2 g/dL Final    Albumin/Globulin Ratio 08/05/2019 2.0  1.0 - 2.5 Final    GFR Non- 08/05/2019 >60  >60 mL/min Final    GFR  08/05/2019 >60  >60 mL/min Final    GFR Comment 08/05/2019        Final    Comment: Average GFR for 61-76 years old:   80 mL/min/1.73sq NEGATIVE  NEGATIVE Final    Urobilinogen, Urine 08/05/2019 Normal  Normal Final    Nitrite, Urine 08/05/2019 NEGATIVE  NEGATIVE Final    Leukocyte Esterase, Urine 08/05/2019 TRACE* NEGATIVE Final    Urinalysis Comments 08/05/2019 NOT REPORTED   Final    - 08/05/2019        Final    WBC, UA 08/05/2019 0 TO 4  0 - 4 /HPF Final    RBC, UA 08/05/2019 5 TO 10  0 - 4 /HPF Final    Casts UA 08/05/2019 NOT REPORTED  0 - 2 /LPF Final    Crystals UA 08/05/2019 NOT REPORTED  None /HPF Final    Epithelial Cells UA 08/05/2019 0 TO 4  0 - 5 /HPF Final    Renal Epithelial, Urine 08/05/2019 NOT REPORTED  0 /HPF Final    Bacteria, UA 08/05/2019 TRACE* None Final    Mucus, UA 08/05/2019 2+* None Final    Trichomonas, UA 08/05/2019 NOT REPORTED  None Final    Amorphous, UA 08/05/2019 NOT REPORTED  None Final    Other Observations UA 08/05/2019 NOT REPORTED  NOT REQ. Final    Yeast, UA 08/05/2019 NOT REPORTED  None Final         Assessment:       Diagnosis Orders   1. Routine general medical examination at a health care facility     2. Need for vaccination  PNEUMOVAX 23 subcutaneous/IM (Pneumococcal polysaccharide vaccine 23-valent >= 3yo)   3. Essential hypertension     4. Mixed hyperlipidemia     5. Type 2 diabetes mellitus with other circulatory complication, without long-term current use of insulin ([address]     6. Erectile dysfunction, unspecified erectile dysfunction type     7. Elevated PSA     8.  Family dysfunction             Plan:      Refuses prostate biopsy  Refuses to follow-up with urology at this time strongly encouraged him to reconsider this  Refuses colonoscopy  Pneumovax today was agreeable to doing this today  Reviewed recent labs  Work on lifestyle changes  Good nutrition hydration  Discussed the risks of his decisions    [employer] received counseling on the following healthy behaviors: nutrition, exercise and medication adherence    Patient given educational materials on Diabetes, Hyperlipidemia,

## 2019-08-12 RX ORDER — TAMSULOSIN HYDROCHLORIDE 0.4 MG/1
CAPSULE ORAL
Qty: 30 CAPSULE | Refills: 5 | Status: SHIPPED | OUTPATIENT
Start: 2019-08-12 | End: 2020-04-02

## 2019-08-26 ENCOUNTER — OFFICE VISIT (OUTPATIENT)
Dept: UROLOGY | Age: 59
End: 2019-08-26

## 2019-08-26 ENCOUNTER — HOSPITAL ENCOUNTER (OUTPATIENT)
Age: 59
Setting detail: SPECIMEN
Discharge: HOME | End: 2019-08-26

## 2019-08-26 VITALS
HEIGHT: 65 IN | DIASTOLIC BLOOD PRESSURE: 80 MMHG | BODY MASS INDEX: 29.46 KG/M2 | HEART RATE: 85 BPM | SYSTOLIC BLOOD PRESSURE: 132 MMHG | WEIGHT: 176.8 LBS

## 2019-08-26 DIAGNOSIS — R31.29 MICROHEMATURIA: ICD-10-CM

## 2019-08-26 DIAGNOSIS — N40.1 BENIGN LOCALIZED PROSTATIC HYPERPLASIA WITH LOWER URINARY TRACT SYMPTOMS (LUTS): ICD-10-CM

## 2019-08-26 DIAGNOSIS — N52.9 ERECTILE DYSFUNCTION, UNSPECIFIED ERECTILE DYSFUNCTION TYPE: ICD-10-CM

## 2019-08-26 DIAGNOSIS — R97.20 ELEVATED PSA: Primary | ICD-10-CM

## 2019-08-26 DIAGNOSIS — R97.20 ELEVATED PSA: ICD-10-CM

## 2019-08-26 PROCEDURE — 99214 OFFICE O/P EST MOD 30 MIN: CPT | Performed by: UROLOGY

## 2019-08-26 PROCEDURE — 87081 CULTURE SCREEN ONLY: CPT

## 2019-08-31 LAB
FLUOROQUINOLONE RESISTANT ORG, CULT: NORMAL
ZZ NTE WITH NAME CLEAN UP: SPECIMEN SOURCE: NORMAL

## 2019-09-10 ENCOUNTER — TELEPHONE (OUTPATIENT)
Dept: UROLOGY | Age: 59
End: 2019-09-10

## 2019-09-10 DIAGNOSIS — R97.20 ELEVATED PSA: ICD-10-CM

## 2019-09-10 DIAGNOSIS — Z01.818 PRE-OPERATIVE CLEARANCE: Primary | ICD-10-CM

## 2019-09-11 ENCOUNTER — HOSPITAL ENCOUNTER (OUTPATIENT)
Dept: NON INVASIVE DIAGNOSTICS | Age: 59
Discharge: HOME | End: 2019-09-11

## 2019-09-11 DIAGNOSIS — Z01.818 PRE-OPERATIVE CLEARANCE: ICD-10-CM

## 2019-09-11 LAB
EKG ATRIAL RATE: 93 BPM
EKG P AXIS: 37 DEGREES
EKG P-R INTERVAL: 172 MS
EKG Q-T INTERVAL: 326 MS
EKG QRS DURATION: 72 MS
EKG QTC CALCULATION (BAZETT): 405 MS
EKG R AXIS: 16 DEGREES
EKG T AXIS: 60 DEGREES
EKG VENTRICULAR RATE: 93 BPM

## 2019-09-11 PROCEDURE — 93005 ELECTROCARDIOGRAM TRACING: CPT

## 2019-09-13 RX ORDER — CIPROFLOXACIN 500 MG/1
500 TABLET, FILM COATED ORAL 2 TIMES DAILY
Qty: 6 TABLET | Refills: 0 | Status: SHIPPED | OUTPATIENT
Start: 2019-09-13 | End: 2019-09-16

## 2019-09-16 ENCOUNTER — TELEPHONE (OUTPATIENT)
Dept: UROLOGY | Age: 59
End: 2019-09-16

## 2019-11-09 DIAGNOSIS — J30.1 SEASONAL ALLERGIC RHINITIS DUE TO POLLEN: ICD-10-CM

## 2019-11-12 RX ORDER — GLIMEPIRIDE 2 MG/1
TABLET ORAL
Qty: 90 TABLET | Refills: 1 | Status: SHIPPED | OUTPATIENT
Start: 2019-11-12 | End: 2020-05-11

## 2019-11-12 RX ORDER — BENZOYL PEROXIDE
KIT TOPICAL
Qty: 30 TABLET | Refills: 3 | Status: SHIPPED | OUTPATIENT
Start: 2019-11-12 | End: 2020-03-10

## 2019-12-24 ENCOUNTER — HOSPITAL ENCOUNTER (OUTPATIENT)
Age: 59
Setting detail: SPECIMEN
Discharge: HOME | End: 2019-12-24

## 2019-12-24 ENCOUNTER — APPOINTMENT (OUTPATIENT)
Dept: GENERAL RADIOLOGY | Age: 59
End: 2019-12-24

## 2019-12-24 ENCOUNTER — HOSPITAL ENCOUNTER (EMERGENCY)
Age: 59
Discharge: LEFT BEFORE DISCHARGE | End: 2019-12-24
Attending: EMERGENCY MEDICINE

## 2019-12-24 ENCOUNTER — OFFICE VISIT (OUTPATIENT)
Dept: PRIMARY CARE CLINIC | Age: 59
End: 2019-12-24

## 2019-12-24 VITALS
WEIGHT: 175 LBS | HEART RATE: 120 BPM | OXYGEN SATURATION: 98 % | BODY MASS INDEX: 29.16 KG/M2 | HEIGHT: 65 IN | DIASTOLIC BLOOD PRESSURE: 80 MMHG | SYSTOLIC BLOOD PRESSURE: 150 MMHG | RESPIRATION RATE: 16 BRPM | TEMPERATURE: 99.1 F

## 2019-12-24 VITALS
DIASTOLIC BLOOD PRESSURE: 84 MMHG | HEART RATE: 106 BPM | OXYGEN SATURATION: 98 % | RESPIRATION RATE: 14 BRPM | SYSTOLIC BLOOD PRESSURE: 147 MMHG | TEMPERATURE: 100.2 F

## 2019-12-24 DIAGNOSIS — R30.0 DYSURIA: ICD-10-CM

## 2019-12-24 DIAGNOSIS — D72.829 LEUKOCYTOSIS, UNSPECIFIED TYPE: ICD-10-CM

## 2019-12-24 DIAGNOSIS — R07.9 CHEST PAIN, UNSPECIFIED TYPE: Primary | ICD-10-CM

## 2019-12-24 DIAGNOSIS — R07.9 CHEST PAIN, UNSPECIFIED TYPE: ICD-10-CM

## 2019-12-24 DIAGNOSIS — R30.0 DYSURIA: Primary | ICD-10-CM

## 2019-12-24 LAB
-: NORMAL
ABSOLUTE EOS #: 0.03 K/UL (ref 0–0.44)
ABSOLUTE IMMATURE GRANULOCYTE: 0.08 K/UL (ref 0–0.3)
ABSOLUTE LYMPH #: 0.85 K/UL (ref 1.1–3.7)
ABSOLUTE MONO #: 1.36 K/UL (ref 0.1–1.2)
AMORPHOUS: NORMAL
ANION GAP SERPL CALCULATED.3IONS-SCNC: 13 MMOL/L (ref 9–17)
BACTERIA: NORMAL
BASOPHILS # BLD: 0 % (ref 0–2)
BASOPHILS ABSOLUTE: 0.07 K/UL (ref 0–0.2)
BILIRUBIN URINE: NEGATIVE
BUN BLDV-MCNC: 12 MG/DL (ref 8–23)
BUN/CREAT BLD: 12 (ref 9–20)
CALCIUM SERPL-MCNC: 9 MG/DL (ref 8.6–10.4)
CASTS UA: NORMAL /LPF (ref 0–2)
CHLORIDE BLD-SCNC: 98 MMOL/L (ref 98–107)
CO2: 24 MMOL/L (ref 20–31)
COLOR: ABNORMAL
COMMENT UA: ABNORMAL
CREAT SERPL-MCNC: 0.98 MG/DL (ref 0.7–1.2)
CRYSTALS, UA: NORMAL /HPF
D DIMER: <100 NG/ML
DIFFERENTIAL TYPE: ABNORMAL
EOSINOPHILS RELATIVE PERCENT: 0 % (ref 1–4)
EPITHELIAL CELLS UA: NORMAL /HPF (ref 0–5)
GFR AFRICAN AMERICAN: >60 ML/MIN
GFR NON-AFRICAN AMERICAN: >60 ML/MIN
GFR SERPL CREATININE-BSD FRML MDRD: ABNORMAL ML/MIN/{1.73_M2}
GFR SERPL CREATININE-BSD FRML MDRD: ABNORMAL ML/MIN/{1.73_M2}
GLUCOSE BLD-MCNC: 174 MG/DL (ref 70–99)
GLUCOSE URINE: NEGATIVE
HCT VFR BLD CALC: 48.8 % (ref 40.7–50.3)
HEMOGLOBIN: 17.2 G/DL (ref 13–17)
IMMATURE GRANULOCYTES: 1 %
KETONES, URINE: NEGATIVE
LEUKOCYTE ESTERASE, URINE: ABNORMAL
LYMPHOCYTES # BLD: 5 % (ref 24–43)
MCH RBC QN AUTO: 30.3 PG (ref 25.2–33.5)
MCHC RBC AUTO-ENTMCNC: 35.2 G/DL (ref 25.2–33.5)
MCV RBC AUTO: 86.1 FL (ref 82.6–102.9)
MONOCYTES # BLD: 8 % (ref 3–12)
MUCUS: NORMAL
NITRITE, URINE: NEGATIVE
NRBC AUTOMATED: 0 PER 100 WBC
OTHER OBSERVATIONS UA: NORMAL
PDW BLD-RTO: 12.6 % (ref 11.8–14.4)
PH UA: 6.5 (ref 5–6)
PLATELET # BLD: 149 K/UL (ref 138–453)
PLATELET ESTIMATE: ABNORMAL
PMV BLD AUTO: 10.2 FL (ref 8.1–13.5)
POTASSIUM SERPL-SCNC: 4.1 MMOL/L (ref 3.7–5.3)
PROTEIN UA: NEGATIVE
RBC # BLD: 5.67 M/UL (ref 4.21–5.77)
RBC # BLD: ABNORMAL 10*6/UL
RBC UA: NORMAL /HPF (ref 0–4)
RENAL EPITHELIAL, UA: NORMAL /HPF
SEG NEUTROPHILS: 86 % (ref 36–65)
SEGMENTED NEUTROPHILS ABSOLUTE COUNT: 14.76 K/UL (ref 1.5–8.1)
SODIUM BLD-SCNC: 135 MMOL/L (ref 135–144)
SPECIFIC GRAVITY UA: 1.01 (ref 1.01–1.02)
TRICHOMONAS: NORMAL
TROPONIN INTERP: NORMAL
TROPONIN T: NORMAL NG/ML
TROPONIN, HIGH SENSITIVITY: 8 NG/L (ref 0–22)
TURBIDITY: ABNORMAL
URINE HGB: ABNORMAL
UROBILINOGEN, URINE: NORMAL
WBC # BLD: 17.2 K/UL (ref 3.5–11.3)
WBC # BLD: ABNORMAL 10*3/UL
WBC UA: NORMAL /HPF (ref 0–4)
YEAST: NORMAL

## 2019-12-24 PROCEDURE — 80048 BASIC METABOLIC PNL TOTAL CA: CPT

## 2019-12-24 PROCEDURE — 71046 X-RAY EXAM CHEST 2 VIEWS: CPT

## 2019-12-24 PROCEDURE — 84484 ASSAY OF TROPONIN QUANT: CPT

## 2019-12-24 PROCEDURE — 81001 URINALYSIS AUTO W/SCOPE: CPT

## 2019-12-24 PROCEDURE — 36415 COLL VENOUS BLD VENIPUNCTURE: CPT

## 2019-12-24 PROCEDURE — 85025 COMPLETE CBC W/AUTO DIFF WBC: CPT

## 2019-12-24 PROCEDURE — 99213 OFFICE O/P EST LOW 20 MIN: CPT | Performed by: NURSE PRACTITIONER

## 2019-12-24 PROCEDURE — 99285 EMERGENCY DEPT VISIT HI MDM: CPT

## 2019-12-24 PROCEDURE — 93005 ELECTROCARDIOGRAM TRACING: CPT | Performed by: EMERGENCY MEDICINE

## 2019-12-24 PROCEDURE — 85379 FIBRIN DEGRADATION QUANT: CPT

## 2019-12-24 RX ORDER — ASPIRIN 81 MG/1
324 TABLET, CHEWABLE ORAL ONCE
Status: DISCONTINUED | OUTPATIENT
Start: 2019-12-24 | End: 2019-12-24 | Stop reason: HOSPADM

## 2019-12-24 RX ORDER — SODIUM CHLORIDE 9 MG/ML
INJECTION, SOLUTION INTRAVENOUS CONTINUOUS
Status: DISCONTINUED | OUTPATIENT
Start: 2019-12-24 | End: 2019-12-24 | Stop reason: HOSPADM

## 2019-12-24 RX ORDER — NITROGLYCERIN 0.4 MG/1
0.4 TABLET SUBLINGUAL EVERY 5 MIN PRN
Status: DISCONTINUED | OUTPATIENT
Start: 2019-12-24 | End: 2019-12-24 | Stop reason: HOSPADM

## 2019-12-24 ASSESSMENT — ENCOUNTER SYMPTOMS
RESPIRATORY NEGATIVE: 1
BACK PAIN: 1
GASTROINTESTINAL NEGATIVE: 1

## 2019-12-24 ASSESSMENT — PAIN DESCRIPTION - PAIN TYPE: TYPE: ACUTE PAIN

## 2019-12-24 ASSESSMENT — PAIN DESCRIPTION - ORIENTATION: ORIENTATION: RIGHT

## 2019-12-24 ASSESSMENT — PAIN DESCRIPTION - DIRECTION: RADIATING_TOWARDS: DOES NOT RADIATE

## 2019-12-24 ASSESSMENT — PAIN DESCRIPTION - ONSET: ONSET: ON-GOING

## 2019-12-24 ASSESSMENT — PAIN DESCRIPTION - DESCRIPTORS: DESCRIPTORS: PRESSURE

## 2019-12-24 ASSESSMENT — PAIN SCALES - GENERAL: PAINLEVEL_OUTOF10: 3

## 2019-12-26 LAB
EKG ATRIAL RATE: 110 BPM
EKG P AXIS: 58 DEGREES
EKG P-R INTERVAL: 174 MS
EKG Q-T INTERVAL: 316 MS
EKG QRS DURATION: 78 MS
EKG QTC CALCULATION (BAZETT): 427 MS
EKG R AXIS: 46 DEGREES
EKG T AXIS: 66 DEGREES
EKG VENTRICULAR RATE: 110 BPM

## 2019-12-31 ENCOUNTER — HOSPITAL ENCOUNTER (OUTPATIENT)
Age: 59
Setting detail: SPECIMEN
Discharge: HOME | End: 2019-12-31

## 2019-12-31 ENCOUNTER — OFFICE VISIT (OUTPATIENT)
Dept: PRIMARY CARE CLINIC | Age: 59
End: 2019-12-31

## 2019-12-31 VITALS
HEIGHT: 65 IN | OXYGEN SATURATION: 98 % | DIASTOLIC BLOOD PRESSURE: 78 MMHG | SYSTOLIC BLOOD PRESSURE: 130 MMHG | TEMPERATURE: 98.1 F | HEART RATE: 100 BPM | WEIGHT: 173.4 LBS | BODY MASS INDEX: 28.89 KG/M2 | RESPIRATION RATE: 16 BRPM

## 2019-12-31 DIAGNOSIS — N30.01 ACUTE CYSTITIS WITH HEMATURIA: Primary | ICD-10-CM

## 2019-12-31 DIAGNOSIS — R30.9 PAIN WITH URINATION: ICD-10-CM

## 2019-12-31 LAB
-: ABNORMAL
AMORPHOUS: ABNORMAL
BACTERIA: ABNORMAL
BILIRUBIN URINE: NEGATIVE
CASTS UA: ABNORMAL /LPF (ref 0–2)
COLOR: ABNORMAL
COMMENT UA: ABNORMAL
CRYSTALS, UA: ABNORMAL /HPF
EPITHELIAL CELLS UA: ABNORMAL /HPF (ref 0–5)
GLUCOSE URINE: NEGATIVE
KETONES, URINE: NEGATIVE
LEUKOCYTE ESTERASE, URINE: ABNORMAL
MUCUS: ABNORMAL
NITRITE, URINE: POSITIVE
OTHER OBSERVATIONS UA: ABNORMAL
PH UA: 6 (ref 5–6)
PROTEIN UA: ABNORMAL
RBC UA: >100 /HPF (ref 0–4)
RENAL EPITHELIAL, UA: ABNORMAL /HPF
SPECIFIC GRAVITY UA: 1.02 (ref 1.01–1.02)
TRICHOMONAS: ABNORMAL
TURBIDITY: ABNORMAL
URINE HGB: ABNORMAL
UROBILINOGEN, URINE: NORMAL
WBC UA: ABNORMAL /HPF (ref 0–4)
YEAST: ABNORMAL

## 2019-12-31 PROCEDURE — 87088 URINE BACTERIA CULTURE: CPT

## 2019-12-31 PROCEDURE — 81001 URINALYSIS AUTO W/SCOPE: CPT

## 2019-12-31 PROCEDURE — 99213 OFFICE O/P EST LOW 20 MIN: CPT | Performed by: NURSE PRACTITIONER

## 2019-12-31 PROCEDURE — 87186 SC STD MICRODIL/AGAR DIL: CPT

## 2019-12-31 PROCEDURE — 87086 URINE CULTURE/COLONY COUNT: CPT

## 2019-12-31 RX ORDER — SULFAMETHOXAZOLE AND TRIMETHOPRIM 800; 160 MG/1; MG/1
1 TABLET ORAL 2 TIMES DAILY
Qty: 20 TABLET | Refills: 0 | Status: SHIPPED | OUTPATIENT
Start: 2019-12-31 | End: 2020-01-10

## 2020-01-02 LAB
CULTURE: ABNORMAL
Lab: ABNORMAL
SPECIMEN DESCRIPTION: ABNORMAL

## 2020-01-06 RX ORDER — ENALAPRIL MALEATE 20 MG/1
TABLET ORAL
Qty: 90 TABLET | Refills: 0 | Status: SHIPPED | OUTPATIENT
Start: 2020-01-06 | End: 2020-01-10 | Stop reason: SDUPTHER

## 2020-01-10 RX ORDER — ENALAPRIL MALEATE 20 MG/1
TABLET ORAL
Qty: 90 TABLET | Refills: 2 | Status: SHIPPED | OUTPATIENT
Start: 2020-01-10 | End: 2020-10-05

## 2020-01-23 ENCOUNTER — TELEPHONE (OUTPATIENT)
Dept: FAMILY MEDICINE CLINIC | Age: 60
End: 2020-01-23

## 2020-01-23 ENCOUNTER — OFFICE VISIT (OUTPATIENT)
Dept: FAMILY MEDICINE CLINIC | Age: 60
End: 2020-01-23

## 2020-01-23 ENCOUNTER — HOSPITAL ENCOUNTER (OUTPATIENT)
Dept: LAB | Age: 60
Discharge: HOME | End: 2020-01-23

## 2020-01-23 VITALS
HEART RATE: 97 BPM | SYSTOLIC BLOOD PRESSURE: 134 MMHG | BODY MASS INDEX: 28.82 KG/M2 | OXYGEN SATURATION: 98 % | RESPIRATION RATE: 18 BRPM | WEIGHT: 173 LBS | DIASTOLIC BLOOD PRESSURE: 68 MMHG | HEIGHT: 65 IN

## 2020-01-23 LAB
-: NORMAL
ABSOLUTE EOS #: 0.13 K/UL (ref 0–0.44)
ABSOLUTE IMMATURE GRANULOCYTE: 0.04 K/UL (ref 0–0.3)
ABSOLUTE LYMPH #: 1.74 K/UL (ref 1.1–3.7)
ABSOLUTE MONO #: 0.56 K/UL (ref 0.1–1.2)
AMORPHOUS: NORMAL
BACTERIA: NORMAL
BASOPHILS # BLD: 1 % (ref 0–2)
BASOPHILS ABSOLUTE: 0.08 K/UL (ref 0–0.2)
BILIRUBIN URINE: NEGATIVE
CASTS UA: NORMAL /LPF (ref 0–2)
COLOR: NORMAL
COMMENT UA: NORMAL
CRYSTALS, UA: NORMAL /HPF
DIFFERENTIAL TYPE: ABNORMAL
EOSINOPHILS RELATIVE PERCENT: 2 % (ref 1–4)
EPITHELIAL CELLS UA: NORMAL /HPF (ref 0–5)
GLUCOSE URINE: NEGATIVE
HCT VFR BLD CALC: 49 % (ref 40.7–50.3)
HEMOGLOBIN: 16.9 G/DL (ref 13–17)
IMMATURE GRANULOCYTES: 1 %
KETONES, URINE: NEGATIVE
LEUKOCYTE ESTERASE, URINE: NEGATIVE
LYMPHOCYTES # BLD: 24 % (ref 24–43)
MCH RBC QN AUTO: 30.1 PG (ref 25.2–33.5)
MCHC RBC AUTO-ENTMCNC: 34.5 G/DL (ref 25.2–33.5)
MCV RBC AUTO: 87.3 FL (ref 82.6–102.9)
MONOCYTES # BLD: 8 % (ref 3–12)
MUCUS: NORMAL
NITRITE, URINE: NEGATIVE
NRBC AUTOMATED: 0 PER 100 WBC
OTHER OBSERVATIONS UA: NORMAL
PDW BLD-RTO: 13.2 % (ref 11.8–14.4)
PH UA: 6 (ref 5–6)
PLATELET # BLD: 188 K/UL (ref 138–453)
PLATELET ESTIMATE: ABNORMAL
PMV BLD AUTO: 10.1 FL (ref 8.1–13.5)
PROSTATE SPECIFIC ANTIGEN: 8.8 UG/L
PROTEIN UA: NEGATIVE
RBC # BLD: 5.61 M/UL (ref 4.21–5.77)
RBC # BLD: ABNORMAL 10*6/UL
RBC UA: NORMAL /HPF (ref 0–4)
RENAL EPITHELIAL, UA: NORMAL /HPF
SEG NEUTROPHILS: 65 % (ref 36–65)
SEGMENTED NEUTROPHILS ABSOLUTE COUNT: 4.79 K/UL (ref 1.5–8.1)
SPECIFIC GRAVITY UA: 1.02 (ref 1.01–1.02)
TRICHOMONAS: NORMAL
TURBIDITY: NORMAL
URINE HGB: NEGATIVE
UROBILINOGEN, URINE: NORMAL
WBC # BLD: 7.3 K/UL (ref 3.5–11.3)
WBC # BLD: ABNORMAL 10*3/UL
WBC UA: NORMAL /HPF (ref 0–4)
YEAST: NORMAL

## 2020-01-23 PROCEDURE — G0103 PSA SCREENING: HCPCS

## 2020-01-23 PROCEDURE — 99214 OFFICE O/P EST MOD 30 MIN: CPT | Performed by: PHYSICIAN ASSISTANT

## 2020-01-23 PROCEDURE — G0008 ADMIN INFLUENZA VIRUS VAC: HCPCS | Performed by: PHYSICIAN ASSISTANT

## 2020-01-23 PROCEDURE — 36415 COLL VENOUS BLD VENIPUNCTURE: CPT

## 2020-01-23 PROCEDURE — 81001 URINALYSIS AUTO W/SCOPE: CPT

## 2020-01-23 PROCEDURE — 85025 COMPLETE CBC W/AUTO DIFF WBC: CPT

## 2020-01-23 ASSESSMENT — PATIENT HEALTH QUESTIONNAIRE - PHQ9
SUM OF ALL RESPONSES TO PHQ9 QUESTIONS 1 & 2: 0
2. FEELING DOWN, DEPRESSED OR HOPELESS: 0
SUM OF ALL RESPONSES TO PHQ QUESTIONS 1-9: 0
SUM OF ALL RESPONSES TO PHQ QUESTIONS 1-9: 0
1. LITTLE INTEREST OR PLEASURE IN DOING THINGS: 0

## 2020-01-23 ASSESSMENT — ENCOUNTER SYMPTOMS
WHEEZING: 0
SHORTNESS OF BREATH: 0
NAUSEA: 0
COUGH: 0
VOMITING: 0
CHEST TIGHTNESS: 0
DIARRHEA: 0

## 2020-01-23 NOTE — TELEPHONE ENCOUNTER
I wanted him to get non fasting labs today or follow up labs  I told him I will enter labs and we will mail orders to him

## 2020-01-23 NOTE — PROGRESS NOTES
[facility]    Subjective:      Patient ID: Lauren Garcia is a 60 y.o. y.o. male. Patient is seen following up on ER and urgent care visits for prostate infections. He was finally treated for this 12/31/19 and feels better. Switched his medications here and it is much more economical. Used to drink nestle quick and his stomach does not bother as much. Weight is down too. He has not had chest pain since the prostate was treated. He was seen in ER for chest pain 12/24/19 and left before treatment was complete. He did not have his phone and family did not know he was here. No chest pain or shortness of breath.            Past Medical History:   Diagnosis Date    Anxiety     Depression     Erectile dysfunction     Fatigue     Hyperlipidemia     Hypertension     Impaired fasting glucose     Memory problem     Sleep disorder     Tremor        Past Surgical History:   Procedure Laterality Date    OTHER SURGICAL HISTORY      lipoma removed from neck       Family History   Problem Relation Age of Onset    Heart Disease Father         Heart Attack    Cancer Sister 46        Lung Cancer    Other Maternal Grandfather         COPD smoker    Other Paternal Grandmother         COPD       Allergies   Allergen Reactions    Niaspan [Niacin] Hives       Current Outpatient Medications   Medication Sig Dispense Refill    zoster recombinant adjuvanted vaccine (SHINGRIX) 50 MCG/0.5ML SUSR injection Inject 0.5 mLs into the muscle See Admin Instructions 1 dose now and repeat in 2-6 months 0.5 mL 1    enalapril (VASOTEC) 20 MG tablet TAKE 1 TABLET BY MOUTH ONCE DAILY 90 tablet 2    glimepiride (AMARYL) 2 MG tablet TAKE 1 TABLET BY MOUTH ONCE DAILY IN THE MORNING 90 tablet 1    EQ ALLERGY RELIEF 10 MG tablet TAKE 1 TABLET BY MOUTH ONCE DAILY 30 tablet 3    metFORMIN (GLUCOPHAGE) 500 MG tablet TAKE 1 TABLET BY MOUTH TWICE DAILY WITH MEALS 180 tablet 2    tamsulosin (FLOMAX) 0.4 MG capsule TAKE 1 oropharyngeal exudate. Comments: Post nasal drip noted. Eyes:      General: No scleral icterus. Conjunctiva/sclera: Conjunctivae normal.   Neck:      Musculoskeletal: Normal range of motion and neck supple. No neck rigidity or muscular tenderness. Thyroid: No thyroid mass, thyromegaly or thyroid tenderness. Vascular: No carotid bruit. Cardiovascular:      Rate and Rhythm: Normal rate and regular rhythm. Heart sounds: Normal heart sounds. No murmur. No gallop. Pulmonary:      Effort: Pulmonary effort is normal. No respiratory distress. Breath sounds: Normal breath sounds. No wheezing, rhonchi or rales. Abdominal:      General: Bowel sounds are normal. There is no distension. Palpations: Abdomen is soft. There is no mass. Tenderness: There is no tenderness. There is no guarding or rebound. Hernia: No hernia is present. Musculoskeletal:         General: No swelling. Lymphadenopathy:      Cervical: No cervical adenopathy. Skin:     General: Skin is warm and dry. Findings: No rash. Neurological:      Mental Status: He is alert and oriented to person, place, and time. Psychiatric:         Mood and Affect: Mood normal.         Behavior: Behavior normal.         Thought Content: Thought content normal.         Judgment: Judgment normal.       Hospital Outpatient Visit on 01/23/2020   Component Date Value Ref Range Status    PSA 01/23/2020 8.80* <4.1 ug/L Final    Comment: The Roche \"ECLIA\" assay is used. Results obtained with different assay methods cannot be   used interchangeably.       WBC 01/23/2020 7.3  3.5 - 11.3 k/uL Final    RBC 01/23/2020 5.61  4.21 - 5.77 m/uL Final    Hemoglobin 01/23/2020 16.9  13.0 - 17.0 g/dL Final    Hematocrit 01/23/2020 49.0  40.7 - 50.3 % Final    MCV 01/23/2020 87.3  82.6 - 102.9 fL Final    MCH 01/23/2020 30.1  25.2 - 33.5 pg Final    MCHC 01/23/2020 34.5* 25.2 - 33.5 g/dL Final    RDW 01/23/2020 13.2  11.8 - 14.4 % Final    Platelets 90/48/8422 188  138 - 453 k/uL Final    MPV 01/23/2020 10.1  8.1 - 13.5 fL Final    NRBC Automated 01/23/2020 0.0  0.0 per 100 WBC Final    Differential Type 01/23/2020 NOT REPORTED   Final    WBC Morphology 01/23/2020 NOT REPORTED   Final    RBC Morphology 01/23/2020 NOT REPORTED   Final    Platelet Estimate 93/54/6025 NOT REPORTED   Final    Seg Neutrophils 01/23/2020 65  36 - 65 % Final    Lymphocytes 01/23/2020 24  24 - 43 % Final    Monocytes 01/23/2020 8  3 - 12 % Final    Eosinophils % 01/23/2020 2  1 - 4 % Final    Basophils 01/23/2020 1  0 - 2 % Final    Immature Granulocytes 01/23/2020 1* 0 % Final    Segs Absolute 01/23/2020 4.79  1.50 - 8.10 k/uL Final    Absolute Lymph # 01/23/2020 1.74  1.10 - 3.70 k/uL Final    Absolute Mono # 01/23/2020 0.56  0.10 - 1.20 k/uL Final    Absolute Eos # 01/23/2020 0.13  0.00 - 0.44 k/uL Final    Basophils Absolute 01/23/2020 0.08  0.00 - 0.20 k/uL Final    Absolute Immature Granulocyte 01/23/2020 0.04  0.00 - 0.30 k/uL Final    Color, UA 01/23/2020 NOT REPORTED  YELLOW Final    Turbidity UA 01/23/2020 NOT REPORTED  CLEAR Final    Glucose, Ur 01/23/2020 NEGATIVE  NEGATIVE Final    Bilirubin Urine 01/23/2020 NEGATIVE  NEGATIVE Final    Ketones, Urine 01/23/2020 NEGATIVE  NEGATIVE Final    Specific [place], UA 01/23/2020 1.020  1.010 - 1.025 Final    Urine Hgb 01/23/2020 NEGATIVE  NEGATIVE Final    pH, UA 01/23/2020 6.0  5.0 - 6.0 Final    Protein, UA 01/23/2020 NEGATIVE  NEGATIVE Final    Urobilinogen, Urine 01/23/2020 Normal  Normal Final    Nitrite, Urine 01/23/2020 NEGATIVE  NEGATIVE Final    Leukocyte Esterase, Urine 01/23/2020 NEGATIVE  NEGATIVE Final    Urinalysis Comments 01/23/2020 NOT REPORTED   Final    - 01/23/2020        Final    WBC, UA 01/23/2020 0 TO 4  0 - 4 /HPF Final    RBC, UA 01/23/2020 0 TO 4  0 - 4 /HPF Final    Casts UA 01/23/2020 NOT REPORTED  0 - 2 /LPF Final    Crystals UA 01/23/2020 Specimen Description 12/31/2019 . CLEAN CATCH URINE   Final    Special Requests 12/31/2019 NOT REPORTED   Final    Culture 12/31/2019 ESCHERICHIA COLI >[id number] CFU/ML*  Final   Admission on 12/24/2019, Discharged on 12/24/2019   Component Date Value Ref Range Status    Ventricular Rate 12/24/2019 110  BPM Final    Atrial Rate 12/24/2019 110  BPM Final    P-R Interval 12/24/2019 174  ms Final    QRS Duration 12/24/2019 78  ms Final    Q-T Interval 12/24/2019 316  ms Final    QTc Calculation (Bazett) 12/24/2019 427  ms Final    P Axis 12/24/2019 58  degrees Final    R Axis 12/24/2019 46  degrees Final    T Axis 12/24/2019 66  degrees Final    WBC 12/24/2019 17.2* 3.5 - 11.3 k/uL Final    RBC 12/24/2019 5.67  4.21 - 5.77 m/uL Final    Hemoglobin 12/24/2019 17.2* 13.0 - 17.0 g/dL Final    Hematocrit 12/24/2019 48.8  40.7 - 50.3 % Final    MCV 12/24/2019 86.1  82.6 - 102.9 fL Final    MCH 12/24/2019 30.3  25.2 - 33.5 pg Final    MCHC 12/24/2019 35.2* 25.2 - 33.5 g/dL Final    RDW 12/24/2019 12.6  11.8 - 14.4 % Final    Platelets 58/05/2890 149  138 - 453 k/uL Final    MPV 12/24/2019 10.2  8.1 - 13.5 fL Final    NRBC Automated 12/24/2019 0.0  0.0 per 100 WBC Final    Differential Type 12/24/2019 NOT REPORTED   Final    WBC Morphology 12/24/2019 NOT REPORTED   Final    RBC Morphology 12/24/2019 NOT REPORTED   Final    Platelet Estimate 18/98/3495 NOT REPORTED   Final    Seg Neutrophils 12/24/2019 86* 36 - 65 % Final    Lymphocytes 12/24/2019 5* 24 - 43 % Final    Monocytes 12/24/2019 8  3 - 12 % Final    Eosinophils % 12/24/2019 0* 1 - 4 % Final    Basophils 12/24/2019 0  0 - 2 % Final    Immature Granulocytes 12/24/2019 1* 0 % Final    Segs Absolute 12/24/2019 14.76* 1.50 - 8.10 k/uL Final    Absolute Lymph # 12/24/2019 0.85* 1.10 - 3.70 k/uL Final    Absolute Mono # 12/24/2019 1.36* 0.10 - 1.20 k/uL Final    Absolute Eos # 12/24/2019 0.03  0.00 - 0.44 k/uL Final    Basophils Absolute 12/24/2019 0.07  0.00 - 0.20 k/uL Final    Absolute Immature Granulocyte 12/24/2019 0.08  0.00 - 0.30 k/uL Final    Glucose 12/24/2019 174* 70 - 99 mg/dL Final    BUN 12/24/2019 12  8 - 23 mg/dL Final    CREATININE 12/24/2019 0.98  0.70 - 1.20 mg/dL Final    Bun/Cre Ratio 12/24/2019 12  9 - 20 Final    Calcium 12/24/2019 9.0  8.6 - 10.4 mg/dL Final    Sodium 12/24/2019 135  135 - 144 mmol/L Final    Potassium 12/24/2019 4.1  3.7 - 5.3 mmol/L Final    Chloride 12/24/2019 98  98 - 107 mmol/L Final    CO2 12/24/2019 24  20 - 31 mmol/L Final    Anion Gap 12/24/2019 13  9 - 17 mmol/L Final    GFR Non- 12/24/2019 >60  >60 mL/min Final    GFR  12/24/2019 >60  >60 mL/min Final    GFR Comment 12/24/2019        Final    Comment: Average GFR for 61-76 years old:   80 mL/min/1.73sq m  Chronic Kidney Disease:   <60 mL/min/1.73sq m  Kidney failure:   <15 mL/min/1.73sq m              eGFR calculated using average adult body mass. Additional eGFR calculator available at:        [employer]            GFR Staging 12/24/2019 NOT REPORTED   Final    Troponin, High Sensitivity 12/24/2019 8  0 - 22 ng/L Final    Comment:       High Sensitivity Troponin values cannot be compared with other Troponin methodologies. Patients with high levels of Biotin oral intake (i.e >5mg/day) may have falsely decreased   Troponin levels. Samples collected within 8 hours of biotin intake may require additional   information for diagnosis.       Troponin T 12/24/2019 NOT REPORTED  <0.03 ng/mL Final    Troponin Interp 12/24/2019 NOT REPORTED   Final    D-Dimer, Quant 12/24/2019 <100  <400 ng/mL Final   Hospital Outpatient Visit on 12/24/2019   Component Date Value Ref Range Status    Color, UA 12/24/2019 NOT REPORTED  YELLOW Final    Turbidity UA 12/24/2019 NOT REPORTED  CLEAR Final    Glucose, Ur 12/24/2019 NEGATIVE  NEGATIVE Final    Bilirubin Urine 12/24/2019 NEGATIVE  NEGATIVE Final    Ketones, Urine 12/24/2019 NEGATIVE  NEGATIVE Final    Specific Gravity, UA 12/24/2019 1.010  1.010 - 1.025 Final    Urine Hgb 12/24/2019 TRACE* NEGATIVE Final    pH, UA 12/24/2019 6.5* 5.0 - 6.0 Final    Protein, UA 12/24/2019 NEGATIVE  NEGATIVE Final    Urobilinogen, Urine 12/24/2019 Normal  Normal Final    Nitrite, Urine 12/24/2019 NEGATIVE  NEGATIVE Final    Leukocyte Esterase, Urine 12/24/2019 TRACE* NEGATIVE Final    Urinalysis Comments 12/24/2019 NOT REPORTED   Final    - 12/24/2019        Final    WBC, UA 12/24/2019 0 TO 4  0 - 4 /HPF Final    RBC, UA 12/24/2019 0 TO 4  0 - 4 /HPF Final    Casts UA 12/24/2019 NOT REPORTED  0 - 2 /LPF Final    Crystals UA 12/24/2019 NOT REPORTED  None /HPF Final    Epithelial Cells UA 12/24/2019 0 TO 4  0 - 5 /HPF Final    Renal Epithelial, Urine 12/24/2019 NOT REPORTED  0 /HPF Final    Bacteria, UA 12/24/2019 None  None Final    Mucus, UA 12/24/2019 NOT REPORTED  None Final    Trichomonas, UA 12/24/2019 NOT REPORTED  None Final    Amorphous, UA 12/24/2019 NOT REPORTED  None Final    Other Observations UA 12/24/2019 NOT REPORTED  NOT REQ. Final    Yeast, UA 12/24/2019 NOT REPORTED  None Final           Assessment & Plan:     1. Abnormal laboratory test    - CBC Auto Differential; Future  - Urinalysis Reflex to Culture; Future  - PSA Screening; Future    2. Need for shingles vaccine    - zoster recombinant adjuvanted vaccine [facility]) 50 MCG/0.5ML SUSR injection; Inject 0.5 mLs into the muscle See Admin Instructions 1 dose now and repeat in 2-6 months  Dispense: 0.5 mL; Refill: 1    3. Needs flu shot    - zoster recombinant adjuvanted vaccine [facility]) 50 MCG/0.5ML SUSR injection; Inject 0.5 mLs into the muscle See Admin Instructions 1 dose now and repeat in 2-6 months  Dispense: 0.5 mL; Refill: 1    4. Atypical chest pain    - Ambulatory referral to Cardiology    5.  Family history of heart disease    - Ambulatory referral to

## 2020-01-23 NOTE — TELEPHONE ENCOUNTER
Pt calling stating he's fasting and has an appt at [facility] today and would like to come in this AM while he's fasting and do labs.

## 2020-01-23 NOTE — TELEPHONE ENCOUNTER
Pt calling back stating he was seen today and told to return in to months and  have labs prior to appt but there are no labs ordered, please order, no call back unless he is to not do labs.

## 2020-01-27 ENCOUNTER — TELEPHONE (OUTPATIENT)
Dept: FAMILY MEDICINE CLINIC | Age: 60
End: 2020-01-27

## 2020-02-10 ENCOUNTER — OFFICE VISIT (OUTPATIENT)
Dept: CARDIOLOGY | Age: 60
End: 2020-02-10

## 2020-02-10 VITALS
WEIGHT: 173 LBS | HEART RATE: 92 BPM | DIASTOLIC BLOOD PRESSURE: 80 MMHG | BODY MASS INDEX: 28.82 KG/M2 | HEIGHT: 65 IN | SYSTOLIC BLOOD PRESSURE: 136 MMHG

## 2020-02-10 PROCEDURE — 99204 OFFICE O/P NEW MOD 45 MIN: CPT | Performed by: INTERNAL MEDICINE

## 2020-02-10 PROCEDURE — 99214 OFFICE O/P EST MOD 30 MIN: CPT

## 2020-02-10 NOTE — PROGRESS NOTES
Cardiology Consultation/Follow Up. [facility]    CC: Patient is here for new consult. HPI  Jovita Ugarte  Is here for new consultation. Sent over to us for chest pains. Has history of DM, HTN, DL, FHX CAD. On 20 had UTI symptoms, went to urgent care, complained of chest pain. Transferred to ER. UTI was treated. Chest pain was pressure like. In the center of chest. Not worse with exertion. Lasted 1-2 weeks. Comes and goes. Thinks it may have been the chili he eat. No sob, orthopnea, pnd, le edema. Walks regularly, walks daily 30 minutes plus. No cp or sob with walking. Lifts weights 5x / weeks. Past Medical:  Past Medical History:   Diagnosis Date    Anxiety     Depression     Erectile dysfunction     Fatigue     Hyperlipidemia     Hypertension     Impaired fasting glucose     Memory problem     Sleep disorder     Tremor        Past Surgical:  Past Surgical History:   Procedure Laterality Date    OTHER SURGICAL HISTORY      lipoma removed from neck       Family History:  Family History   Problem Relation Age of Onset    Heart Disease Father         Heart Attack    Cancer Sister 46        Lung Cancer    Other Maternal Grandfather         COPD smoker    Other Paternal Grandmother         COPD       Social History:  Social History     Tobacco Use    Smoking status: Former Smoker     Packs/day: 2.00     Years: 20.00     Pack years: 40.00     Types: Cigarettes     Start date: 6/15/1974     Last attempt to quit: 6/15/1989     Years since quittin.6    Smokeless tobacco: Never Used    Tobacco comment: quit over 25 years ago   Substance Use Topics    Alcohol use: No     Comment: quit 12 years ago    Drug use: No        REVIEW OF SYSTEMS:    · Constitutional: there has been no unanticipated weight loss. There's been No change in energy level, No change in activity level. · Eyes: No visual changes or diplopia. No scleral icterus.   · ENT: No Headaches, hearing

## 2020-02-26 ENCOUNTER — HOSPITAL ENCOUNTER (OUTPATIENT)
Dept: LAB | Age: 60
Discharge: HOME | End: 2020-02-26

## 2020-02-26 LAB
ALBUMIN SERPL-MCNC: 4.9 G/DL (ref 3.5–5.2)
ALBUMIN/GLOBULIN RATIO: 2.2 (ref 1–2.5)
ALP BLD-CCNC: 74 U/L (ref 40–129)
ALT SERPL-CCNC: 37 U/L (ref 5–41)
ANION GAP SERPL CALCULATED.3IONS-SCNC: 14 MMOL/L (ref 9–17)
AST SERPL-CCNC: 26 U/L
BILIRUB SERPL-MCNC: 0.53 MG/DL (ref 0.3–1.2)
BUN BLDV-MCNC: 12 MG/DL (ref 8–23)
BUN/CREAT BLD: 12 (ref 9–20)
CALCIUM SERPL-MCNC: 9.4 MG/DL (ref 8.6–10.4)
CHLORIDE BLD-SCNC: 101 MMOL/L (ref 98–107)
CHOLESTEROL, FASTING: 157 MG/DL
CHOLESTEROL/HDL RATIO: 3.6
CO2: 25 MMOL/L (ref 20–31)
CREAT SERPL-MCNC: 1.03 MG/DL (ref 0.7–1.2)
CREATININE URINE: 94.2 MG/DL (ref 39–259)
ESTIMATED AVERAGE GLUCOSE: 103 MG/DL
GFR AFRICAN AMERICAN: >60 ML/MIN
GFR NON-AFRICAN AMERICAN: >60 ML/MIN
GFR SERPL CREATININE-BSD FRML MDRD: ABNORMAL ML/MIN/{1.73_M2}
GFR SERPL CREATININE-BSD FRML MDRD: ABNORMAL ML/MIN/{1.73_M2}
GLUCOSE BLD-MCNC: 142 MG/DL (ref 70–99)
HBA1C MFR BLD: 5.2 % (ref 4.8–5.9)
HDLC SERPL-MCNC: 44 MG/DL
LDL CHOLESTEROL: 99 MG/DL (ref 0–130)
MICROALBUMIN/CREAT 24H UR: 21 MG/L
MICROALBUMIN/CREAT UR-RTO: 22 MCG/MG CREAT
POTASSIUM SERPL-SCNC: 4.1 MMOL/L (ref 3.7–5.3)
SODIUM BLD-SCNC: 140 MMOL/L (ref 135–144)
TOTAL PROTEIN: 7.1 G/DL (ref 6.4–8.3)
TRIGLYCERIDE, FASTING: 70 MG/DL
TSH SERPL DL<=0.05 MIU/L-ACNC: 3.12 MIU/L (ref 0.3–5)
VITAMIN D 25-HYDROXY: 23.2 NG/ML (ref 30–100)
VLDLC SERPL CALC-MCNC: NORMAL MG/DL (ref 1–30)

## 2020-02-26 PROCEDURE — 80053 COMPREHEN METABOLIC PANEL: CPT

## 2020-02-26 PROCEDURE — 82043 UR ALBUMIN QUANTITATIVE: CPT

## 2020-02-26 PROCEDURE — 82306 VITAMIN D 25 HYDROXY: CPT

## 2020-02-26 PROCEDURE — 80061 LIPID PANEL: CPT

## 2020-02-26 PROCEDURE — 84443 ASSAY THYROID STIM HORMONE: CPT

## 2020-02-26 PROCEDURE — 36415 COLL VENOUS BLD VENIPUNCTURE: CPT

## 2020-02-26 PROCEDURE — 82570 ASSAY OF URINE CREATININE: CPT

## 2020-02-26 PROCEDURE — 83036 HEMOGLOBIN GLYCOSYLATED A1C: CPT

## 2020-03-10 RX ORDER — LORATADINE 10 MG/1
TABLET ORAL
Qty: 30 TABLET | Refills: 5 | Status: SHIPPED | OUTPATIENT
Start: 2020-03-10 | End: 2020-09-15

## 2020-03-13 ENCOUNTER — HOSPITAL ENCOUNTER (OUTPATIENT)
Dept: LAB | Age: 60
Discharge: HOME | End: 2020-03-13

## 2020-03-13 ENCOUNTER — OFFICE VISIT (OUTPATIENT)
Dept: FAMILY MEDICINE CLINIC | Age: 60
End: 2020-03-13

## 2020-03-13 VITALS
OXYGEN SATURATION: 98 % | TEMPERATURE: 98.2 F | BODY MASS INDEX: 28.49 KG/M2 | WEIGHT: 171 LBS | HEART RATE: 109 BPM | DIASTOLIC BLOOD PRESSURE: 84 MMHG | SYSTOLIC BLOOD PRESSURE: 138 MMHG | HEIGHT: 65 IN | RESPIRATION RATE: 14 BRPM

## 2020-03-13 LAB
ABSOLUTE EOS #: 0.06 K/UL (ref 0–0.44)
ABSOLUTE IMMATURE GRANULOCYTE: 0.04 K/UL (ref 0–0.3)
ABSOLUTE LYMPH #: 1.57 K/UL (ref 1.1–3.7)
ABSOLUTE MONO #: 0.62 K/UL (ref 0.1–1.2)
BASOPHILS # BLD: 1 % (ref 0–2)
BASOPHILS ABSOLUTE: 0.07 K/UL (ref 0–0.2)
DIFFERENTIAL TYPE: ABNORMAL
EOSINOPHILS RELATIVE PERCENT: 1 % (ref 1–4)
HCT VFR BLD CALC: 48.4 % (ref 40.7–50.3)
HEMOGLOBIN: 16.7 G/DL (ref 13–17)
IMMATURE GRANULOCYTES: 0 %
LYMPHOCYTES # BLD: 16 % (ref 24–43)
MCH RBC QN AUTO: 30.3 PG (ref 25.2–33.5)
MCHC RBC AUTO-ENTMCNC: 34.5 G/DL (ref 25.2–33.5)
MCV RBC AUTO: 87.8 FL (ref 82.6–102.9)
MONOCYTES # BLD: 6 % (ref 3–12)
NRBC AUTOMATED: 0 PER 100 WBC
PDW BLD-RTO: 12.8 % (ref 11.8–14.4)
PLATELET # BLD: 178 K/UL (ref 138–453)
PLATELET ESTIMATE: ABNORMAL
PMV BLD AUTO: 10.2 FL (ref 8.1–13.5)
RBC # BLD: 5.51 M/UL (ref 4.21–5.77)
RBC # BLD: ABNORMAL 10*6/UL
SEG NEUTROPHILS: 76 % (ref 36–65)
SEGMENTED NEUTROPHILS ABSOLUTE COUNT: 7.58 K/UL (ref 1.5–8.1)
TROPONIN INTERP: NORMAL
TROPONIN T: NORMAL NG/ML
TROPONIN, HIGH SENSITIVITY: 6 NG/L (ref 0–22)
WBC # BLD: 9.9 K/UL (ref 3.5–11.3)
WBC # BLD: ABNORMAL 10*3/UL

## 2020-03-13 PROCEDURE — 84484 ASSAY OF TROPONIN QUANT: CPT

## 2020-03-13 PROCEDURE — 36415 COLL VENOUS BLD VENIPUNCTURE: CPT

## 2020-03-13 PROCEDURE — 83013 H PYLORI (C-13) BREATH: CPT

## 2020-03-13 PROCEDURE — 83014 H PYLORI DRUG ADMIN: CPT

## 2020-03-13 PROCEDURE — 99212 OFFICE O/P EST SF 10 MIN: CPT | Performed by: PHYSICIAN ASSISTANT

## 2020-03-13 PROCEDURE — 85025 COMPLETE CBC W/AUTO DIFF WBC: CPT

## 2020-03-13 PROCEDURE — 99214 OFFICE O/P EST MOD 30 MIN: CPT | Performed by: PHYSICIAN ASSISTANT

## 2020-03-13 RX ORDER — OMEPRAZOLE 40 MG/1
40 CAPSULE, DELAYED RELEASE ORAL DAILY
Qty: 30 CAPSULE | Refills: 3 | Status: SHIPPED | OUTPATIENT
Start: 2020-03-13 | End: 2021-07-15

## 2020-03-13 ASSESSMENT — PATIENT HEALTH QUESTIONNAIRE - PHQ9
SUM OF ALL RESPONSES TO PHQ QUESTIONS 1-9: 0
1. LITTLE INTEREST OR PLEASURE IN DOING THINGS: 0
SUM OF ALL RESPONSES TO PHQ9 QUESTIONS 1 & 2: 0
SUM OF ALL RESPONSES TO PHQ QUESTIONS 1-9: 0
2. FEELING DOWN, DEPRESSED OR HOPELESS: 0

## 2020-03-13 ASSESSMENT — ENCOUNTER SYMPTOMS
VOMITING: 0
NAUSEA: 0
ABDOMINAL PAIN: 1
BACK PAIN: 0
CONSTIPATION: 0
CHEST TIGHTNESS: 0
WHEEZING: 0
SHORTNESS OF BREATH: 0
DIARRHEA: 0
COUGH: 0

## 2020-03-13 NOTE — PROGRESS NOTES
capsule 5    ibuprofen (ADVIL;MOTRIN) 800 MG tablet Take 1 tablet by mouth every 8 hours as needed for Pain 30 tablet 5    glucose blood VI test strips (ASCENSIA AUTODISC VI;ONE TOUCH ULTRA TEST VI) strip 1 each by In Vitro route daily As needed. 100 each 3     No current facility-administered medications for this visit. Review of Systems   Constitutional: Negative for appetite change, chills, fatigue and fever. Respiratory: Negative for cough, chest tightness, shortness of breath and wheezing. Cardiovascular: Positive for chest pain. Negative for palpitations. Gastrointestinal: Positive for abdominal pain. Negative for constipation, diarrhea, nausea and vomiting. Last BM nl is was a little soft. Genitourinary: Negative. Negative for difficulty urinating. States stream is the same as usual does not want to have biopsy with urology. Refuses. Musculoskeletal: Negative for back pain and myalgias. Skin: Negative for rash. Neurological: Negative for dizziness, light-headedness, numbness and headaches. Psychiatric/Behavioral: Negative for sleep disturbance. The patient is not nervous/anxious. Once falls asleep is ok. His chest and stomach keep him awake. Objective:      /84   Pulse 109   Temp 98.2 °F (36.8 °C)   Resp 14   Ht 5' 5\" (1.651 m)   Wt 171 lb (77.6 kg)   SpO2 98%   BMI 28.46 kg/m²     Physical Exam  Vitals signs and nursing note reviewed. Constitutional:       General: He is not in acute distress. Appearance: Normal appearance. He is well-developed. HENT:      Head: Normocephalic and atraumatic. Right Ear: Tympanic membrane normal.      Left Ear: Tympanic membrane normal.      Nose: Nose normal. No congestion or rhinorrhea. Mouth/Throat:      Mouth: Mucous membranes are moist.      Pharynx: No oropharyngeal exudate or posterior oropharyngeal erythema. Eyes:      General: No scleral icterus.      Conjunctiva/sclera: Conjunctivae normal.   Neck:      Musculoskeletal: Normal range of motion and neck supple. No neck rigidity or muscular tenderness. Thyroid: No thyroid mass, thyromegaly or thyroid tenderness. Cardiovascular:      Rate and Rhythm: Normal rate and regular rhythm. Heart sounds: Normal heart sounds. No murmur. No gallop. Pulmonary:      Effort: Pulmonary effort is normal.      Breath sounds: Normal breath sounds. No wheezing, rhonchi or rales. Abdominal:      General: Bowel sounds are normal. There is no distension. Palpations: Abdomen is soft. There is no mass. Tenderness: There is no abdominal tenderness. There is no guarding or rebound. Hernia: No hernia is present. Musculoskeletal:         General: No swelling. Lymphadenopathy:      Cervical: No cervical adenopathy. Skin:     General: Skin is warm and dry. Findings: No rash. Neurological:      General: No focal deficit present. Mental Status: He is alert and oriented to person, place, and time. Gait: Gait normal.   Psychiatric:         Mood and Affect: Mood normal.       Hospital Outpatient Visit on 03/13/2020   Component Date Value Ref Range Status    Troponin, High Sensitivity 03/13/2020 6  0 - 22 ng/L Final    Comment:       High Sensitivity Troponin values cannot be compared with other Troponin methodologies. Patients with high levels of Biotin oral intake (i.e >5mg/day) may have falsely decreased   Troponin levels. Samples collected within 8 hours of biotin intake may require additional   information for diagnosis.       Troponin T 03/13/2020 NOT REPORTED  <0.03 ng/mL Final    Troponin Interp 03/13/2020 NOT REPORTED   Final    WBC 03/13/2020 9.9  3.5 - 11.3 k/uL Final    RBC 03/13/2020 5.51  4.21 - 5.77 m/uL Final    Hemoglobin 03/13/2020 16.7  13.0 - 17.0 g/dL Final    Hematocrit 03/13/2020 48.4  40.7 - 50.3 % Final    MCV 03/13/2020 87.8  82.6 - 102.9 fL Final    MCH 03/13/2020 30.3  25.2 - 33.5 pg Final    MCHC 03/13/2020 34.5* 25.2 - 33.5 g/dL Final    RDW 03/13/2020 12.8  11.8 - 14.4 % Final    Platelets 98/01/0752 178  138 - 453 k/uL Final    MPV 03/13/2020 10.2  8.1 - 13.5 fL Final    NRBC Automated 03/13/2020 0.0  0.0 per 100 WBC Final    Differential Type 03/13/2020 NOT REPORTED   Final    WBC Morphology 03/13/2020 NOT REPORTED   Final    RBC Morphology 03/13/2020 NOT REPORTED   Final    Platelet Estimate 67/30/0724 NOT REPORTED   Final    Seg Neutrophils 03/13/2020 76* 36 - 65 % Final    Lymphocytes 03/13/2020 16* 24 - 43 % Final    Monocytes 03/13/2020 6  3 - 12 % Final    Eosinophils % 03/13/2020 1  1 - 4 % Final    Basophils 03/13/2020 1  0 - 2 % Final    Immature Granulocytes 03/13/2020 0  0 % Final    Segs Absolute 03/13/2020 7.58  1.50 - 8.10 k/uL Final    Absolute Lymph # 03/13/2020 1.57  1.10 - 3.70 k/uL Final    Absolute Mono # 03/13/2020 0.62  0.10 - 1.20 k/uL Final    Absolute Eos # 03/13/2020 0.06  0.00 - 0.44 k/uL Final    Basophils Absolute 03/13/2020 0.07  0.00 - 0.20 k/uL Final    Absolute Immature Granulocyte 03/13/2020 0.04  0.00 - 0.30 k/uL Final    H Pylori Breath Test 03/13/2020 Negative  Negative Final    Comment: (NOTE)  INTERPRETIVE INFORMATION: H. pylori Breath Test  A negative result does not rule out the possibility of H.pylori   infection. If clinical signs are suggestive of H. pylori   infection, retest with a new specimen or an alternate method. Known causes of false-negative results include:  1. Use of antibiotics, proton pump inhibitors, and bismuth     preparations during the preceding 2 weeks. 2.  Administration of the breath test less than 4 weeks     after completion of definitive therapy to eradicate     H. pylori. 3.  Premature or late collection of the post-dose specimen. Known causes of false-positive results include:  1. Patients with achlorhydria.   2.  Rinsing the testing solution in the mouth or not using      the straw Comment:    Cholesterol Guidelines:      <200  Desirable   200-240  Borderline      >240  Undesirable         HDL 02/26/2020 44  >40 mg/dL Final    Comment:    HDL Guidelines:    <40     Undesirable   40-59    Borderline    >59     Desirable         LDL Cholesterol 02/26/2020 99  0 - 130 mg/dL Final    Comment:    LDL Guidelines:     <100    Desirable   100-129   Near to/above Desirable   130-159   Borderline      >159   Undesirable     Direct (measured) LDL and calculated LDL are not interchangeable tests.  Chol/HDL Ratio 02/26/2020 3.6  <5 Final            Triglyceride, Fasting 02/26/2020 70  <150 mg/dL Final    Comment:    Triglyceride Guidelines:     <150   Desirable   150-199  Borderline   200-499  High     >499   Very high   Based on AHA Guidelines for fasting triglyceride, October 2012.  VLDL 02/26/2020 NOT REPORTED  1 - 30 mg/dL Final    Vit D, 25-Hydroxy 02/26/2020 23.2* 30.0 - 100.0 ng/mL Final    Comment:    Reference Range:  Vitamin D status         Range   Deficiency              <20 ng/mL   Mild Deficiency       20-30 ng/mL   Sufficiency           ng/mL   Toxicity               >100 ng/mL      TSH 02/26/2020 3.12  0.30 - 5.00 mIU/L Final    Microalb, Ur 02/26/2020 21* <21 mg/L Final    Creatinine, Ur 02/26/2020 94.2  39.0 - 259.0 mg/dL Final    Microalb/Crt. Ratio 02/26/2020 22* <17 mcg/mg creat Final           Assessment & Plan:     1. Chest pain, unspecified type    - CBC Auto Differential; Future  - Troponin I; Future  - omeprazole (PRILOSEC) 40 MG delayed release capsule; Take 1 capsule by mouth daily  Dispense: 30 capsule; Refill: 3    2. Gastroesophageal reflux disease without esophagitis    - H. Pylori Breath Test; Future  - omeprazole (PRILOSEC) 40 MG delayed release capsule; Take 1 capsule by mouth daily  Dispense: 30 capsule; Refill: 3    3. Vitamin D deficiency      4. Family history of heart disease    5. Essential hypertension  Controlled    6.  Pure hypercholesterolemia      7. Elevated PSA      8. Erectile dysfunction, unspecified erectile dysfunction type      9. Depression with anxiety      10. Difficulty controlling anger      11. Type 2 diabetes mellitus with other circulatory complication, without long-term current use of insulin (HCC)  improved    Eats to eat appropriately avoid saucy spicy heavy foods  Take Prilosec for at least 1 month  He is to follow-up in 2 weeks sooner if problems  Limit caffeine  He will be notified of all results  Further treatment based on results  Discussed not following up with cardiology and not following up to get a stress test he comes in today complaining of atypical chest pain he has been in the ER there is no excuse for not getting that stress test done and I was very adamant with patient that he needs to follow through keep his appointments and get that testing done  Lifestyle changes  Reviewed recent labs      Tia Sargent received counseling on the following healthy behaviors: nutrition, exercise and medication adherence    Patient given educational materials on Diabetes, Hyperlipidemia, Nutrition, Exercise and Hypertension    I have instructed Tia Sargent to complete a self tracking handout on Blood Sugars , Blood Pressures  and Weights and instructed them to bring it with them to his next appointment. Discussed use, benefit, and side effects of prescribed medications. Barriers to medication compliance addressed. All patient questions answered. Pt voiced understanding.      Sridhar Marc  3/19/2020 3:14 PM EDT    (Pleasenote that portions of this note were completed with a voice recognition program.Efforts were made to edit the dictations but occasionally words are mis-transcribed.)

## 2020-03-15 LAB — H PYLORI BREATH TEST: NEGATIVE

## 2020-03-16 ENCOUNTER — TELEPHONE (OUTPATIENT)
Dept: GENERAL RADIOLOGY | Age: 60
End: 2020-03-16

## 2020-03-16 DIAGNOSIS — R06.02 SOB (SHORTNESS OF BREATH): ICD-10-CM

## 2020-03-16 DIAGNOSIS — R07.9 CHEST PAIN, UNSPECIFIED TYPE: Primary | ICD-10-CM

## 2020-03-18 ENCOUNTER — HOSPITAL ENCOUNTER (OUTPATIENT)
Dept: NUCLEAR MEDICINE | Age: 60
Discharge: HOME | End: 2020-03-20

## 2020-03-18 ENCOUNTER — HOSPITAL ENCOUNTER (OUTPATIENT)
Dept: NON INVASIVE DIAGNOSTICS | Age: 60
Discharge: HOME | End: 2020-03-18

## 2020-03-18 PROCEDURE — 78452 HT MUSCLE IMAGE SPECT MULT: CPT

## 2020-03-18 PROCEDURE — A9500 TC99M SESTAMIBI: HCPCS | Performed by: INTERNAL MEDICINE

## 2020-03-18 PROCEDURE — 3430000000 HC RX DIAGNOSTIC RADIOPHARMACEUTICAL: Performed by: INTERNAL MEDICINE

## 2020-03-18 PROCEDURE — 93017 CV STRESS TEST TRACING ONLY: CPT

## 2020-03-18 RX ADMIN — TETRAKIS(2-METHOXYISOBUTYLISOCYANIDE)COPPER(I) TETRAFLUOROBORATE 10 MILLICURIE: 1 INJECTION, POWDER, LYOPHILIZED, FOR SOLUTION INTRAVENOUS at 10:14

## 2020-03-18 RX ADMIN — TETRAKIS(2-METHOXYISOBUTYLISOCYANIDE)COPPER(I) TETRAFLUOROBORATE 30 MILLICURIE: 1 INJECTION, POWDER, LYOPHILIZED, FOR SOLUTION INTRAVENOUS at 10:18

## 2020-03-18 NOTE — PROGRESS NOTES
Patient Name:  Hillery Aase MRN:  [id number]   :  1960  Age:  60 y.o. Sex: male   Ordering Provider: Serina Muniz DO  Referring Provider: LILA Garces  Primary Care Provider:  LILA Garces     Indications: Chest Pain   Medications:     Current Outpatient Medications:     Cholecalciferol (VITAMIN D3 PO), Take 5,000 Units by mouth, Disp: , Rfl:     omeprazole (PRILOSEC) 40 MG delayed release capsule, Take 1 capsule by mouth daily, Disp: 30 capsule, Rfl: 3    ALLERGY RELIEF 10 MG tablet, TAKE 1 TABLET BY MOUTH ONE TIME DAILY, Disp: 30 tablet, Rfl: 5    enalapril (VASOTEC) 20 MG tablet, TAKE 1 TABLET BY MOUTH ONCE DAILY, Disp: 90 tablet, Rfl: 2    glimepiride (AMARYL) 2 MG tablet, TAKE 1 TABLET BY MOUTH ONCE DAILY IN THE MORNING, Disp: 90 tablet, Rfl: 1    metFORMIN (GLUCOPHAGE) 500 MG tablet, TAKE 1 TABLET BY MOUTH TWICE DAILY WITH MEALS, Disp: 180 tablet, Rfl: 2    tamsulosin (FLOMAX) 0.4 MG capsule, TAKE 1 CAPSULE BY MOUTH ONCE DAILY, Disp: 30 capsule, Rfl: 5    ibuprofen (ADVIL;MOTRIN) 800 MG tablet, Take 1 tablet by mouth every 8 hours as needed for Pain, Disp: 30 tablet, Rfl: 5    glucose blood VI test strips (ASCENSIA AUTODISC VI;ONE TOUCH ULTRA TEST VI) strip, 1 each by In Vitro route daily As needed. , Disp: 100 each, Rfl: 3  No current facility-administered medications for this encounter.      Facility-Administered Medications Ordered in Other Encounters:     technetium sestamibi (CARDIOLITE) injection 30 millicurie, 30 millicurie, Intravenous, ONCE PRN, Gadiel Aly DO    technetium sestamibi (CARDIOLITE) injection 10 millicurie, 10 millicurie, Intravenous, ONCE PRN, Gadiel Aly DO     ------------------------------------------------------------------------------------------------    Predicted Heart Rate:  Maximum:  160  85%:  136     Heart Rate Restin   Standin     Blood Pressure Restin/90  Standin/90     Exercise Protocol Used:  DRAKE   Exercise

## 2020-03-19 PROCEDURE — 93018 CV STRESS TEST I&R ONLY: CPT | Performed by: INTERNAL MEDICINE

## 2020-03-19 NOTE — PROCEDURES
[address], [place] [address]                              CARDIAC STRESS TEST    PATIENT NAME: Dorene Be                      :        1960  MED REC NO:   [id number]                             ROOM:  ACCOUNT NO:   [de-identified]                           ADMIT DATE: 2020  PROVIDER:     Brendaon Victoriano    DATE OF STUDY:  2020    STRESS TEST    Ordering Provider:  Jessica Walker DO    Primary Care Provider: Bhargavi Mckinney PA-C    Patient's Age: 60      Weight: 171 pounds    INDICATION:  Chest pain. Predicted Heart Rate: Maximum 160     85%: 136    Heart Rate Restin   Standin    Blood pressure restin/90   Standin/90    Exercise protocol used:   Alon  Exercise Duration/Stage: 6:00; stage II    Test terminated due to:  Shortness of breath. Imaging: Cardiolite    Maximum heart rate: 153  (95%)  Max blood pressure: 190/80  Max Workload: 7.00 METS    Chest Pain: No.     Onset: N/A          Severity: N/A    Baseline EKG: Normal sinus rhythm. Normal ECG. EKG changes: No ischemic changes noted. Arrhythmias: PVC. Interpretation:  1. Average exercise capacity. 2. No ischemic changes. 3. Cardiolite results to follow. Nuclear Myocardial Perfusion Imaging (SPECT)    TESTING METHOD  STRESS:   Treadmill  AGENT:    Cardiolite  REST:     Injection Date: 2020 Time: 820 Amount: 10.98 mCi  STRESS:   Injection Date: 2020 Time: 0915 Amount: 33.7 mCi  IMAGE TIME:    Rest: 0845  Stress:  1000    EF:  73%  TID:  0.87  LHR:  0.34    FINDINGS:  Ischemia (Reversible Defect): No  Infarction (Irreversible Defect):  No  Ejection fraction Calculated: Normal  Segmental wall motion:   Normal    Normal study. IMPRESSION:  1. No ischemia or infarction. 2. Normal LV systolic function.       UNIQUE CERRATO    D: 2020 7:03:00       T: 2020 7:03:35     /FRANCA_EDDIE  Job#: [id number]     Doc#:

## 2020-03-23 ENCOUNTER — OFFICE VISIT (OUTPATIENT)
Dept: FAMILY MEDICINE CLINIC | Age: 60
End: 2020-03-23

## 2020-03-23 ENCOUNTER — HOSPITAL ENCOUNTER (OUTPATIENT)
Age: 60
Setting detail: SPECIMEN
Discharge: HOME | End: 2020-03-23

## 2020-03-23 VITALS
HEART RATE: 130 BPM | HEIGHT: 65 IN | OXYGEN SATURATION: 98 % | DIASTOLIC BLOOD PRESSURE: 84 MMHG | SYSTOLIC BLOOD PRESSURE: 158 MMHG | RESPIRATION RATE: 14 BRPM | WEIGHT: 168 LBS | TEMPERATURE: 102.8 F | BODY MASS INDEX: 27.99 KG/M2

## 2020-03-23 LAB
-: ABNORMAL
AMORPHOUS: ABNORMAL
BACTERIA: ABNORMAL
BILIRUBIN URINE: NEGATIVE
CASTS UA: ABNORMAL /LPF (ref 0–2)
COLOR: ABNORMAL
COMMENT UA: ABNORMAL
CRYSTALS, UA: ABNORMAL /HPF
EPITHELIAL CELLS UA: ABNORMAL /HPF (ref 0–5)
GLUCOSE URINE: ABNORMAL
KETONES, URINE: ABNORMAL
LEUKOCYTE ESTERASE, URINE: ABNORMAL
MUCUS: ABNORMAL
NITRITE, URINE: POSITIVE
OTHER OBSERVATIONS UA: ABNORMAL
PH UA: 5.5 (ref 5–6)
PROTEIN UA: ABNORMAL
RBC UA: ABNORMAL /HPF (ref 0–4)
RENAL EPITHELIAL, UA: ABNORMAL /HPF
SPECIFIC GRAVITY UA: 1.02 (ref 1.01–1.02)
TRICHOMONAS: ABNORMAL
TURBIDITY: ABNORMAL
URINE HGB: ABNORMAL
UROBILINOGEN, URINE: NORMAL
WBC UA: >100 /HPF (ref 0–4)
YEAST: ABNORMAL

## 2020-03-23 PROCEDURE — 99212 OFFICE O/P EST SF 10 MIN: CPT

## 2020-03-23 PROCEDURE — 99213 OFFICE O/P EST LOW 20 MIN: CPT | Performed by: PHYSICIAN ASSISTANT

## 2020-03-23 PROCEDURE — 87186 SC STD MICRODIL/AGAR DIL: CPT

## 2020-03-23 PROCEDURE — 81001 URINALYSIS AUTO W/SCOPE: CPT

## 2020-03-23 PROCEDURE — 87086 URINE CULTURE/COLONY COUNT: CPT

## 2020-03-23 PROCEDURE — 87088 URINE BACTERIA CULTURE: CPT

## 2020-03-23 RX ORDER — METOPROLOL SUCCINATE 25 MG/1
25 TABLET, EXTENDED RELEASE ORAL DAILY
Qty: 30 TABLET | Refills: 3 | Status: SHIPPED | OUTPATIENT
Start: 2020-03-23 | End: 2020-07-13

## 2020-03-23 RX ORDER — CIPROFLOXACIN 500 MG/1
500 TABLET, FILM COATED ORAL 2 TIMES DAILY
Qty: 20 TABLET | Refills: 0 | Status: SHIPPED | OUTPATIENT
Start: 2020-03-23 | End: 2020-04-02

## 2020-03-23 RX ORDER — PHENAZOPYRIDINE HYDROCHLORIDE 200 MG/1
200 TABLET, FILM COATED ORAL 3 TIMES DAILY PRN
Qty: 9 TABLET | Refills: 0 | Status: SHIPPED | OUTPATIENT
Start: 2020-03-23 | End: 2020-03-26

## 2020-03-23 ASSESSMENT — ENCOUNTER SYMPTOMS
VOMITING: 0
DIARRHEA: 0
BACK PAIN: 0
NAUSEA: 0
RESPIRATORY NEGATIVE: 1

## 2020-03-23 ASSESSMENT — PATIENT HEALTH QUESTIONNAIRE - PHQ9
SUM OF ALL RESPONSES TO PHQ QUESTIONS 1-9: 0
SUM OF ALL RESPONSES TO PHQ QUESTIONS 1-9: 0
2. FEELING DOWN, DEPRESSED OR HOPELESS: 0
1. LITTLE INTEREST OR PLEASURE IN DOING THINGS: 0
SUM OF ALL RESPONSES TO PHQ9 QUESTIONS 1 & 2: 0

## 2020-03-23 NOTE — PROGRESS NOTES
[facility]    Subjective:      Patient ID: Jovita Ugarte is a 60 y.o. y.o. male. Patient is seen today following up on stress test and the past 4-5 days he has been having urinary sx. It started after his stress test.  It was really bad this weekend. Felt like razor blades to urinate this weekend. Urine is strong and a bad odor. Urine has been cloudy. He has been drinking water but it is hard to urinate. Last night had chills and shakes. His thermometer does not work. Had sweats this weekend. States did take his bp medications today. Not taking Tylenol since it has been known to kill people he states.          Past Medical History:   Diagnosis Date    Anxiety     Depression     Erectile dysfunction     Fatigue     Hyperlipidemia     Hypertension     Impaired fasting glucose     Memory problem     Sleep disorder     Tremor        Past Surgical History:   Procedure Laterality Date    OTHER SURGICAL HISTORY      lipoma removed from neck       Family History   Problem Relation Age of Onset    Heart Disease Father         Heart Attack    Cancer Sister 46        Lung Cancer    Other Maternal Grandfather         COPD smoker    Other Paternal Grandmother         COPD       Allergies   Allergen Reactions    Niaspan [Niacin] Hives       Current Outpatient Medications   Medication Sig Dispense Refill    ciprofloxacin (CIPRO) 500 MG tablet Take 1 tablet by mouth 2 times daily for 10 days 20 tablet 0    phenazopyridine (PYRIDIUM) 200 MG tablet Take 1 tablet by mouth 3 times daily as needed for Pain 9 tablet 0    metoprolol succinate (TOPROL XL) 25 MG extended release tablet Take 1 tablet by mouth daily 30 tablet 3    Cholecalciferol (VITAMIN D3 PO) Take 5,000 Units by mouth      omeprazole (PRILOSEC) 40 MG delayed release capsule Take 1 capsule by mouth daily 30 capsule 3    ALLERGY RELIEF 10 MG tablet TAKE 1 TABLET BY MOUTH ONE TIME DAILY 30 tablet 5    enalapril (VASOTEC) 20 MG tablet TAKE 1 TABLET BY MOUTH ONCE DAILY 90 tablet 2    glimepiride (AMARYL) 2 MG tablet TAKE 1 TABLET BY MOUTH ONCE DAILY IN THE MORNING 90 tablet 1    metFORMIN (GLUCOPHAGE) 500 MG tablet TAKE 1 TABLET BY MOUTH TWICE DAILY WITH MEALS 180 tablet 2    tamsulosin (FLOMAX) 0.4 MG capsule TAKE 1 CAPSULE BY MOUTH ONCE DAILY 30 capsule 5    ibuprofen (ADVIL;MOTRIN) 800 MG tablet Take 1 tablet by mouth every 8 hours as needed for Pain 30 tablet 5    glucose blood VI test strips (ASCENSIA AUTODISC VI;ONE TOUCH ULTRA TEST VI) strip 1 each by In Vitro route daily As needed. 100 each 3     No current facility-administered medications for this visit. Review of Systems   Constitutional: Positive for activity change, appetite change, chills, diaphoresis and fatigue. Negative for fever. HENT: Negative. Respiratory: Negative. Cardiovascular: Negative for chest pain and palpitations. Gastrointestinal: Negative for diarrhea, nausea and vomiting. Genitourinary: Positive for difficulty urinating, dysuria, frequency, hematuria and urgency. Musculoskeletal: Negative for back pain and myalgias. Skin: Negative for rash. Neurological: Positive for weakness and light-headedness. Negative for dizziness, numbness and headaches. Psychiatric/Behavioral: Positive for sleep disturbance. The patient is not nervous/anxious. Objective:      BP (!) 158/84   Pulse 130   Temp 102.8 °F (39.3 °C)   Resp 14   Ht 5' 5\" (1.651 m)   Wt 168 lb (76.2 kg)   SpO2 98%   BMI 27.96 kg/m²     Physical Exam  Vitals signs and nursing note reviewed. Constitutional:       General: He is not in acute distress. Appearance: He is well-developed. He is ill-appearing. HENT:      Head: Normocephalic and atraumatic.       Right Ear: Tympanic membrane normal.      Left Ear: Tympanic membrane normal.      Mouth/Throat:      Mouth: Mucous membranes are moist.      Pharynx: No oropharyngeal exudate or posterior oropharyngeal erythema. Eyes:      General: No scleral icterus. Conjunctiva/sclera: Conjunctivae normal.   Neck:      Musculoskeletal: Normal range of motion and neck supple. No neck rigidity or muscular tenderness. Cardiovascular:      Rate and Rhythm: Regular rhythm. Tachycardia present. Heart sounds: Normal heart sounds. No murmur. Pulmonary:      Effort: Pulmonary effort is normal.      Breath sounds: Normal breath sounds. No wheezing, rhonchi or rales. Abdominal:      General: Bowel sounds are normal. There is no distension. Palpations: Abdomen is soft. There is no mass. Tenderness: There is no abdominal tenderness. There is no right CVA tenderness, left CVA tenderness, guarding or rebound. Hernia: No hernia is present. Musculoskeletal:         General: No swelling. Lymphadenopathy:      Cervical: No cervical adenopathy. Skin:     General: Skin is warm and dry. Findings: No rash. Comments: Skin was very warm to touch. Neurological:      General: No focal deficit present. Mental Status: He is alert and oriented to person, place, and time.       Gait: Gait normal.   Psychiatric:         Mood and Affect: Mood normal.         Behavior: Behavior normal.       Pulse Readings from Last 3 Encounters:   03/23/20 130   03/13/20 109   02/10/20 92     Hospital Outpatient Visit on 03/23/2020   Component Date Value Ref Range Status    Color, UA 03/23/2020 NOT REPORTED  YELLOW Final    Turbidity UA 03/23/2020 NOT REPORTED  CLEAR Final    Glucose, Ur 03/23/2020 3+* NEGATIVE Final    Bilirubin Urine 03/23/2020 NEGATIVE  NEGATIVE Final    Ketones, Urine 03/23/2020 1+* NEGATIVE Final    Specific [place], UA 03/23/2020 1.020  1.010 - 1.025 Final    Urine Hgb 03/23/2020 2+* NEGATIVE Final    pH, UA 03/23/2020 5.5  5.0 - 6.0 Final    Protein, UA 03/23/2020 1+* NEGATIVE Final    Urobilinogen, Urine 03/23/2020 Normal  Normal Final    Nitrite, Urine 03/23/2020 POSITIVE* Krystal Cash MD, Lab. Director     Hospital Outpatient Visit on 02/26/2020   Component Date Value Ref Range Status    Glucose 02/26/2020 142* 70 - 99 mg/dL Final    BUN 02/26/2020 12  8 - 23 mg/dL Final    CREATININE 02/26/2020 1.03  0.70 - 1.20 mg/dL Final    Bun/Cre Ratio 02/26/2020 12  9 - 20 Final    Calcium 02/26/2020 9.4  8.6 - 10.4 mg/dL Final    Sodium 02/26/2020 140  135 - 144 mmol/L Final    Potassium 02/26/2020 4.1  3.7 - 5.3 mmol/L Final    Chloride 02/26/2020 101  98 - 107 mmol/L Final    CO2 02/26/2020 25  20 - 31 mmol/L Final    Anion Gap 02/26/2020 14  9 - 17 mmol/L Final    Alkaline Phosphatase 02/26/2020 74  40 - 129 U/L Final    ALT 02/26/2020 37  5 - 41 U/L Final    AST 02/26/2020 26  <40 U/L Final    Total Bilirubin 02/26/2020 0.53  0.3 - 1.2 mg/dL Final    Total Protein 02/26/2020 7.1  6.4 - 8.3 g/dL Final    Alb 02/26/2020 4.9  3.5 - 5.2 g/dL Final    Albumin/Globulin Ratio 02/26/2020 2.2  1.0 - 2.5 Final    GFR Non- 02/26/2020 >60  >60 mL/min Final    GFR  02/26/2020 >60  >60 mL/min Final    GFR Comment 02/26/2020        Final    Comment: Average GFR for 61-76 years old:   80 mL/min/1.73sq m  Chronic Kidney Disease:   <60 mL/min/1.73sq m  Kidney failure:   <15 mL/min/1.73sq m              eGFR calculated using average adult body mass.  Additional eGFR calculator available at:        [email or web address]            GFR Staging 02/26/2020 NOT REPORTED   Final    Hemoglobin A1C 02/26/2020 5.2  4.8 - 5.9 % Final    Estimated Avg Glucose 02/26/2020 103  mg/dL Final    Cholesterol, Fasting 02/26/2020 157  <200 mg/dL Final    Comment:    Cholesterol Guidelines:      <200  Desirable   200-240  Borderline      >240  Undesirable         HDL 02/26/2020 44  >40 mg/dL Final    Comment:    HDL Guidelines:    <40     Undesirable   40-59    Borderline    >59     Desirable         LDL Cholesterol 02/26/2020 99  0 - 130 mg/dL Final    Comment:    LDL Guidelines:     <100    Desirable   100-129   Near to/above Desirable   130-159   Borderline      >159   Undesirable     Direct (measured) LDL and calculated LDL are not interchangeable tests.  Chol/HDL Ratio 02/26/2020 3.6  <5 Final            Triglyceride, Fasting 02/26/2020 70  <150 mg/dL Final    Comment:    Triglyceride Guidelines:     <150   Desirable   150-199  Borderline   200-499  High     >499   Very high   Based on AHA Guidelines for fasting triglyceride, October 2012.  VLDL 02/26/2020 NOT REPORTED  1 - 30 mg/dL Final    Vit D, 25-Hydroxy 02/26/2020 23.2* 30.0 - 100.0 ng/mL Final    Comment:    Reference Range:  Vitamin D status         Range   Deficiency              <20 ng/mL   Mild Deficiency       20-30 ng/mL   Sufficiency           ng/mL   Toxicity               >100 ng/mL      TSH 02/26/2020 3.12  0.30 - 5.00 mIU/L Final    Microalb, Ur 02/26/2020 21* <21 mg/L Final    Creatinine, Ur 02/26/2020 94.2  39.0 - 259.0 mg/dL Final    Microalb/Crt. Ratio 02/26/2020 22* <17 mcg/mg creat Final       Assessment & Plan:     1. Acute cystitis with hematuria    - ciprofloxacin (CIPRO) 500 MG tablet; Take 1 tablet by mouth 2 times daily for 10 days  Dispense: 20 tablet; Refill: 0  - phenazopyridine (PYRIDIUM) 200 MG tablet; Take 1 tablet by mouth 3 times daily as needed for Pain  Dispense: 9 tablet; Refill: 0    2. Dysuria    - phenazopyridine (PYRIDIUM) 200 MG tablet; Take 1 tablet by mouth 3 times daily as needed for Pain  Dispense: 9 tablet; Refill: 0    3. Tachycardia    - metoprolol succinate (TOPROL XL) 25 MG extended release tablet; Take 1 tablet by mouth daily  Dispense: 30 tablet; Refill: 3    4. Essential hypertension    - metoprolol succinate (TOPROL XL) 25 MG extended release tablet; Take 1 tablet by mouth daily  Dispense: 30 tablet;  Refill: 3      Fluids rest  Await culture and sensitivity  Based on his pulse always running upper normal limits but today was very elevated I am putting him on Toprol  Follow-up in a week sooner if problems  Answered his questions  Reviewed labs with patient  Congratulated him on coming in and getting his stress test, there is one less thing he needs to worry about    Génesis Sridhar Vicente  3/24/2020 2:37 PM EDT    (Pleasenote that portions of this note were completed with a voice recognition program.Efforts were made to edit the dictations but occasionally words are mis-transcribed.)

## 2020-03-25 LAB
CULTURE: ABNORMAL
Lab: ABNORMAL
SPECIMEN DESCRIPTION: ABNORMAL

## 2020-03-27 RX ORDER — PHENAZOPYRIDINE HYDROCHLORIDE 200 MG/1
200 TABLET, FILM COATED ORAL 3 TIMES DAILY PRN
Qty: 9 TABLET | Refills: 0 | Status: SHIPPED | OUTPATIENT
Start: 2020-03-27 | End: 2020-03-30

## 2020-04-02 ENCOUNTER — OFFICE VISIT (OUTPATIENT)
Dept: FAMILY MEDICINE CLINIC | Age: 60
End: 2020-04-02

## 2020-04-02 VITALS
OXYGEN SATURATION: 98 % | HEIGHT: 65 IN | WEIGHT: 167 LBS | RESPIRATION RATE: 16 BRPM | DIASTOLIC BLOOD PRESSURE: 72 MMHG | SYSTOLIC BLOOD PRESSURE: 136 MMHG | HEART RATE: 84 BPM | BODY MASS INDEX: 27.82 KG/M2

## 2020-04-02 PROCEDURE — 99213 OFFICE O/P EST LOW 20 MIN: CPT | Performed by: PHYSICIAN ASSISTANT

## 2020-04-02 PROCEDURE — 99211 OFF/OP EST MAY X REQ PHY/QHP: CPT

## 2020-04-02 RX ORDER — CIPROFLOXACIN 500 MG/1
500 TABLET, FILM COATED ORAL 2 TIMES DAILY
Qty: 14 TABLET | Refills: 0 | Status: SHIPPED | OUTPATIENT
Start: 2020-04-02 | End: 2020-04-09

## 2020-04-02 RX ORDER — TAMSULOSIN HYDROCHLORIDE 0.4 MG/1
CAPSULE ORAL
Qty: 30 CAPSULE | Refills: 5 | Status: SHIPPED | OUTPATIENT
Start: 2020-04-02 | End: 2020-10-12

## 2020-04-02 ASSESSMENT — ENCOUNTER SYMPTOMS
RESPIRATORY NEGATIVE: 1
VOMITING: 0
DIARRHEA: 0
NAUSEA: 0

## 2020-04-02 ASSESSMENT — PATIENT HEALTH QUESTIONNAIRE - PHQ9
SUM OF ALL RESPONSES TO PHQ9 QUESTIONS 1 & 2: 0
2. FEELING DOWN, DEPRESSED OR HOPELESS: 0
SUM OF ALL RESPONSES TO PHQ QUESTIONS 1-9: 0
1. LITTLE INTEREST OR PLEASURE IN DOING THINGS: 0
SUM OF ALL RESPONSES TO PHQ QUESTIONS 1-9: 0

## 2020-04-02 NOTE — PROGRESS NOTES
(FLOMAX) 0.4 MG capsule TAKE 1 CAPSULE BY MOUTH ONCE DAILY 30 capsule 5    ibuprofen (ADVIL;MOTRIN) 800 MG tablet Take 1 tablet by mouth every 8 hours as needed for Pain 30 tablet 5    glucose blood VI test strips (ASCENSIA AUTODISC VI;ONE TOUCH ULTRA TEST VI) strip 1 each by In Vitro route daily As needed. 100 each 3     No current facility-administered medications for this visit. Review of Systems   Constitutional: Negative for appetite change, chills, fatigue and fever. Energy is better. Respiratory: Negative. Cardiovascular: Negative for chest pain and palpitations. Gastrointestinal: Negative for diarrhea, nausea and vomiting. Genitourinary: Positive for frequency and urgency. Negative for difficulty urinating, dysuria and hematuria. Still some trouble with urination in am then is fine. It is getting better. Urine is clear now no blood. No back pain. Frequency is less than last visit. Some urgency first in am and in the middle of night. Musculoskeletal: Negative for myalgias. Skin: Negative for rash. Neurological: Negative for light-headedness, numbness and headaches. Psychiatric/Behavioral: Positive for sleep disturbance. The patient is not nervous/anxious. Still up every 2 hours at night. Objective:      /72   Pulse 84   Resp 16   Ht 5' 5\" (1.651 m)   Wt 167 lb (75.8 kg)   SpO2 98%   BMI 27.79 kg/m²     Physical Exam  Vitals signs and nursing note reviewed. Constitutional:       General: He is not in acute distress. Appearance: Normal appearance. He is well-developed. He is not ill-appearing. HENT:      Head: Normocephalic and atraumatic. Right Ear: External ear normal.      Left Ear: External ear normal.      Nose: Nose normal.   Eyes:      General: No scleral icterus. Conjunctiva/sclera: Conjunctivae normal.   Neck:      Musculoskeletal: Normal range of motion and neck supple.  No neck rigidity or muscular REPORTED   Final    WBC Morphology 03/13/2020 NOT REPORTED   Final    RBC Morphology 03/13/2020 NOT REPORTED   Final    Platelet Estimate 55/70/8199 NOT REPORTED   Final    Seg Neutrophils 03/13/2020 76* 36 - 65 % Final    Lymphocytes 03/13/2020 16* 24 - 43 % Final    Monocytes 03/13/2020 6  3 - 12 % Final    Eosinophils % 03/13/2020 1  1 - 4 % Final    Basophils 03/13/2020 1  0 - 2 % Final    Immature Granulocytes 03/13/2020 0  0 % Final    Segs Absolute 03/13/2020 7.58  1.50 - 8.10 k/uL Final    Absolute Lymph # 03/13/2020 1.57  1.10 - 3.70 k/uL Final    Absolute Mono # 03/13/2020 0.62  0.10 - 1.20 k/uL Final    Absolute Eos # 03/13/2020 0.06  0.00 - 0.44 k/uL Final    Basophils Absolute 03/13/2020 0.07  0.00 - 0.20 k/uL Final    Absolute Immature Granulocyte 03/13/2020 0.04  0.00 - 0.30 k/uL Final    H Pylori Breath Test 03/13/2020 Negative  Negative Final    Comment: (NOTE)  INTERPRETIVE INFORMATION: H. pylori Breath Test  A negative result does not rule out the possibility of H.pylori   infection. If clinical signs are suggestive of H. pylori   infection, retest with a new specimen or an alternate method. Known causes of false-negative results include:  1. Use of antibiotics, proton pump inhibitors, and bismuth     preparations during the preceding 2 weeks. 2.  Administration of the breath test less than 4 weeks     after completion of definitive therapy to eradicate     H. pylori. 3.  Premature or late collection of the post-dose specimen. Known causes of false-positive results include:  1. Patients with achlorhydria. 2.  Rinsing the testing solution in the mouth or not using      the straw provided in the kit, which can allow contact      with urease-positive bacteria. 3.  The presence of other gastric spiral organisms such as     Helicobacter heilmanii. Performed by Lauren Gomez , [address] [facility] [phone]  www                           . Milli Shelley

## 2020-05-11 RX ORDER — GLIMEPIRIDE 2 MG/1
TABLET ORAL
Qty: 90 TABLET | Refills: 0 | Status: SHIPPED | OUTPATIENT
Start: 2020-05-11 | End: 2020-08-10

## 2020-07-13 RX ORDER — METOPROLOL SUCCINATE 25 MG/1
TABLET, EXTENDED RELEASE ORAL
Qty: 30 TABLET | Refills: 5 | Status: SHIPPED | OUTPATIENT
Start: 2020-07-13 | End: 2021-07-12 | Stop reason: SDUPTHER

## 2020-07-13 NOTE — TELEPHONE ENCOUNTER
Kristen Lutz called requesting a refill of the below medication which has been pended for you: LM for patient to call and schedule an appt    Requested Prescriptions     Pending Prescriptions Disp Refills    metoprolol succinate (TOPROL XL) 25 MG extended release tablet [Pharmacy Med Name: METOPROLOL SUCCINATE ER 25MG TB24] 30 tablet 3     Sig: TAKE 1 TABLET BY MOUTH ONE TIME A DAY       Last Appointment Date: 4/2/2020  Next Appointment Date: Visit date not found    Allergies   Allergen Reactions    Niaspan [Niacin] Hives

## 2020-07-13 NOTE — TELEPHONE ENCOUNTER
Pt scheduled appt for 7-16, can you order labs so he can come in tomorrow and do those, please advise at above number.

## 2020-07-14 ENCOUNTER — HOSPITAL ENCOUNTER (OUTPATIENT)
Dept: LAB | Age: 60
Discharge: HOME | End: 2020-07-14

## 2020-07-14 LAB
ALBUMIN SERPL-MCNC: 4.7 G/DL (ref 3.5–5.2)
ALBUMIN/GLOBULIN RATIO: 2 (ref 1–2.5)
ALP BLD-CCNC: 69 U/L (ref 40–129)
ALT SERPL-CCNC: 32 U/L (ref 5–41)
ANION GAP SERPL CALCULATED.3IONS-SCNC: 14 MMOL/L (ref 9–17)
AST SERPL-CCNC: 21 U/L
BILIRUB SERPL-MCNC: 0.67 MG/DL (ref 0.3–1.2)
BUN BLDV-MCNC: 14 MG/DL (ref 8–23)
BUN/CREAT BLD: 14 (ref 9–20)
CALCIUM SERPL-MCNC: 9.4 MG/DL (ref 8.6–10.4)
CHLORIDE BLD-SCNC: 102 MMOL/L (ref 98–107)
CHOLESTEROL/HDL RATIO: 3.8
CHOLESTEROL: 166 MG/DL
CO2: 26 MMOL/L (ref 20–31)
CREAT SERPL-MCNC: 1.03 MG/DL (ref 0.7–1.2)
ESTIMATED AVERAGE GLUCOSE: 111 MG/DL
GFR AFRICAN AMERICAN: >60 ML/MIN
GFR NON-AFRICAN AMERICAN: >60 ML/MIN
GFR SERPL CREATININE-BSD FRML MDRD: ABNORMAL ML/MIN/{1.73_M2}
GFR SERPL CREATININE-BSD FRML MDRD: ABNORMAL ML/MIN/{1.73_M2}
GLUCOSE BLD-MCNC: 130 MG/DL (ref 70–99)
HBA1C MFR BLD: 5.5 % (ref 4.8–5.9)
HDLC SERPL-MCNC: 44 MG/DL
LDL CHOLESTEROL: 107 MG/DL (ref 0–130)
POTASSIUM SERPL-SCNC: 4.6 MMOL/L (ref 3.7–5.3)
SODIUM BLD-SCNC: 142 MMOL/L (ref 135–144)
TOTAL PROTEIN: 7 G/DL (ref 6.4–8.3)
TRIGL SERPL-MCNC: 77 MG/DL
VITAMIN D 25-HYDROXY: 58.5 NG/ML (ref 30–100)
VLDLC SERPL CALC-MCNC: NORMAL MG/DL (ref 1–30)

## 2020-07-14 PROCEDURE — 80061 LIPID PANEL: CPT

## 2020-07-14 PROCEDURE — 83036 HEMOGLOBIN GLYCOSYLATED A1C: CPT

## 2020-07-14 PROCEDURE — 36415 COLL VENOUS BLD VENIPUNCTURE: CPT

## 2020-07-14 PROCEDURE — 82306 VITAMIN D 25 HYDROXY: CPT

## 2020-07-14 PROCEDURE — 80053 COMPREHEN METABOLIC PANEL: CPT

## 2020-07-16 ENCOUNTER — OFFICE VISIT (OUTPATIENT)
Dept: FAMILY MEDICINE CLINIC | Age: 60
End: 2020-07-16

## 2020-07-16 VITALS
WEIGHT: 174 LBS | BODY MASS INDEX: 28.99 KG/M2 | HEIGHT: 65 IN | DIASTOLIC BLOOD PRESSURE: 80 MMHG | HEART RATE: 91 BPM | OXYGEN SATURATION: 98 % | SYSTOLIC BLOOD PRESSURE: 134 MMHG | RESPIRATION RATE: 14 BRPM

## 2020-07-16 PROCEDURE — 99214 OFFICE O/P EST MOD 30 MIN: CPT | Performed by: PHYSICIAN ASSISTANT

## 2020-07-16 PROCEDURE — 99212 OFFICE O/P EST SF 10 MIN: CPT

## 2020-07-16 RX ORDER — METOPROLOL SUCCINATE 25 MG/1
25 TABLET, EXTENDED RELEASE ORAL DAILY
Qty: 90 TABLET | Refills: 1 | Status: SHIPPED | OUTPATIENT
Start: 2020-07-16 | End: 2020-07-30

## 2020-07-16 RX ORDER — OMEPRAZOLE 40 MG/1
40 CAPSULE, DELAYED RELEASE ORAL DAILY
Qty: 30 CAPSULE | Refills: 3 | Status: CANCELLED | OUTPATIENT
Start: 2020-07-16

## 2020-07-16 ASSESSMENT — ENCOUNTER SYMPTOMS
BLOOD IN STOOL: 0
NAUSEA: 0
DIARRHEA: 1
VOMITING: 0
RESPIRATORY NEGATIVE: 1

## 2020-07-16 NOTE — PROGRESS NOTES
[facility]    Subjective:      Patient ID: Evaristo Spurling is a 63 y.o. y.o. male. Patient is seen following up on DM. He is doing pretty well. Sometimes has diarrhea after dinner. Especially if eats after six oclock. Has cramping no pain otherwise. Can not afford the shingrix it is not covered. No real illness otherwise. If gets diarrhea sometimes sweats.          Past Medical History:   Diagnosis Date    Anxiety     Depression     Erectile dysfunction     Fatigue     Hyperlipidemia     Hypertension     Impaired fasting glucose     Memory problem     Sleep disorder     Tremor        Past Surgical History:   Procedure Laterality Date    OTHER SURGICAL HISTORY      lipoma removed from neck       Family History   Problem Relation Age of Onset    Heart Disease Father         Heart Attack    Cancer Sister 46        Lung Cancer    Other Maternal Grandfather         COPD smoker    Other Paternal Grandmother         COPD       Allergies   Allergen Reactions    Niaspan [Niacin] Hives       Current Outpatient Medications   Medication Sig Dispense Refill    metoprolol succinate (TOPROL XL) 25 MG extended release tablet Take 1 tablet by mouth daily 90 tablet 1    metoprolol succinate (TOPROL XL) 25 MG extended release tablet TAKE 1 TABLET BY MOUTH ONE TIME A DAY 30 tablet 5    glimepiride (AMARYL) 2 MG tablet TAKE 1 TABLET BY MOUTH EVERY MORNING 90 tablet 0    tamsulosin (FLOMAX) 0.4 MG capsule TAKE 1 CAPSULE BY MOUTH ONE TIME DAILY 30 capsule 5    Cholecalciferol (VITAMIN D3 PO) Take 5,000 Units by mouth      omeprazole (PRILOSEC) 40 MG delayed release capsule Take 1 capsule by mouth daily 30 capsule 3    ALLERGY RELIEF 10 MG tablet TAKE 1 TABLET BY MOUTH ONE TIME DAILY 30 tablet 5    enalapril (VASOTEC) 20 MG tablet TAKE 1 TABLET BY MOUTH ONCE DAILY 90 tablet 2    metFORMIN (GLUCOPHAGE) 500 MG tablet TAKE 1 TABLET BY MOUTH TWICE DAILY WITH MEALS 180 tablet 2    ibuprofen (ADVIL;MOTRIN) 800 MG tablet Take 1 tablet by mouth every 8 hours as needed for Pain 30 tablet 5    glucose blood VI test strips (ASCENSIA AUTODISC VI;ONE TOUCH ULTRA TEST VI) strip 1 each by In Vitro route daily As needed. 100 each 3     No current facility-administered medications for this visit. Review of Systems   Constitutional: Negative for appetite change, chills, fatigue and fever. HENT: Negative. Eyes:        STACIE 12/19 with Dr. Miles Farmer. Respiratory: Negative. Occasionally gets chest congestion sometimes relates on what he eats too. Tylenol helps. Happens the nights he has diarrhea. Cardiovascular: Negative for chest pain and palpitations. Gastrointestinal: Positive for diarrhea. Negative for blood in stool, nausea and vomiting. Genitourinary: Negative. Musculoskeletal: Negative for myalgias. Skin: Negative for rash. Neurological: Negative for dizziness, light-headedness, numbness and headaches. Psychiatric/Behavioral: Negative for sleep disturbance. The patient is not nervous/anxious. Objective:      /80   Pulse 91   Resp 14   Ht 5' 5\" (1.651 m)   Wt 174 lb (78.9 kg)   SpO2 98%   BMI 28.96 kg/m²     Physical Exam  Vitals signs and nursing note reviewed. Constitutional:       General: He is not in acute distress. Appearance: Normal appearance. He is well-developed. He is not ill-appearing. HENT:      Head: Normocephalic and atraumatic. Right Ear: Tympanic membrane, ear canal and external ear normal.      Left Ear: Tympanic membrane, ear canal and external ear normal.      Nose: Nose normal. No congestion or rhinorrhea. Mouth/Throat:      Mouth: Mucous membranes are moist.      Pharynx: No oropharyngeal exudate or posterior oropharyngeal erythema. Eyes:      General: No scleral icterus. Conjunctiva/sclera: Conjunctivae normal.   Neck:      Musculoskeletal: Normal range of motion and neck supple.  No neck rigidity or muscular tenderness. Vascular: No carotid bruit. Cardiovascular:      Rate and Rhythm: Normal rate and regular rhythm. Pulses: Normal pulses. Dorsalis pedis pulses are 2+ on the right side and 2+ on the left side. Posterior tibial pulses are 2+ on the right side and 2+ on the left side. Heart sounds: Normal heart sounds. No murmur. No gallop. Pulmonary:      Effort: Pulmonary effort is normal. No respiratory distress. Breath sounds: Normal breath sounds. No wheezing, rhonchi or rales. Abdominal:      General: Bowel sounds are normal. There is no distension. Palpations: Abdomen is soft. There is no mass. Tenderness: There is no abdominal tenderness. There is no guarding or rebound. Hernia: No hernia is present. Musculoskeletal:      Right lower leg: No edema. Left lower leg: No edema. Right foot: No deformity or bunion. Left foot: No deformity or bunion. Comments: Diabetic foot exam completed sensation intact with filament and vibratory sense. Feet:      Right foot:      Protective Sensation: 10 sites tested. 10 sites sensed. Skin integrity: Skin integrity normal. No ulcer, blister, skin breakdown, erythema, warmth or dry skin. Toenail Condition: Right toenails are normal.      Left foot:      Protective Sensation: 10 sites tested. 10 sites sensed. Skin integrity: Skin integrity normal. No ulcer, blister, skin breakdown, erythema, warmth or dry skin. Toenail Condition: Left toenails are normal.   Lymphadenopathy:      Cervical: No cervical adenopathy. Skin:     General: Skin is warm and dry. Findings: No erythema or rash. Neurological:      General: No focal deficit present. Mental Status: He is alert and oriented to person, place, and time. Sensory: No sensory deficit.       Coordination: Coordination normal.      Gait: Gait normal.   Psychiatric:         Mood and Affect: Mood normal. Behavior: Behavior normal.         Thought Content: Thought content normal.       Hospital Outpatient Visit on 07/14/2020   Component Date Value Ref Range Status    Cholesterol 07/14/2020 166  <200 mg/dL Final    Comment:    Cholesterol Guidelines:      <200  Desirable   200-240  Borderline      >240  Undesirable         HDL 07/14/2020 44  >40 mg/dL Final    Comment:    HDL Guidelines:    <40     Undesirable   40-59    Borderline    >59     Desirable         LDL Cholesterol 07/14/2020 107  0 - 130 mg/dL Final    Comment:    LDL Guidelines:     <100    Desirable   100-129   Near to/above Desirable   130-159   Borderline      >159   Undesirable     Direct (measured) LDL and calculated LDL are not interchangeable tests.  Chol/HDL Ratio 07/14/2020 3.8  <5 Final            Triglycerides 07/14/2020 77  <150 mg/dL Final    Comment:    Triglyceride Guidelines:     <150   Desirable   150-199  Borderline   200-499  High     >499   Very high   Based on AHA Guidelines for fasting triglyceride, October 2012.          VLDL 07/14/2020 NOT REPORTED  1 - 30 mg/dL Final    Glucose 07/14/2020 130* 70 - 99 mg/dL Final    BUN 07/14/2020 14  8 - 23 mg/dL Final    CREATININE 07/14/2020 1.03  0.70 - 1.20 mg/dL Final    Bun/Cre Ratio 07/14/2020 14  9 - 20 Final    Calcium 07/14/2020 9.4  8.6 - 10.4 mg/dL Final    Sodium 07/14/2020 142  135 - 144 mmol/L Final    Potassium 07/14/2020 4.6  3.7 - 5.3 mmol/L Final    Chloride 07/14/2020 102  98 - 107 mmol/L Final    CO2 07/14/2020 26  20 - 31 mmol/L Final    Anion Gap 07/14/2020 14  9 - 17 mmol/L Final    Alkaline Phosphatase 07/14/2020 69  40 - 129 U/L Final    ALT 07/14/2020 32  5 - 41 U/L Final    AST 07/14/2020 21  <40 U/L Final    Total Bilirubin 07/14/2020 0.67  0.3 - 1.2 mg/dL Final    Total Protein 07/14/2020 7.0  6.4 - 8.3 g/dL Final    Alb 07/14/2020 4.7  3.5 - 5.2 g/dL Final    Albumin/Globulin Ratio 07/14/2020 2.0  1.0 - 2.5 Final    GFR Non- American 07/14/2020 >60  >60 mL/min Final    GFR  07/14/2020 >60  >60 mL/min Final    GFR Comment 07/14/2020        Final    Comment: Average GFR for 61-76 years old:   80 mL/min/1.73sq m  Chronic Kidney Disease:   <60 mL/min/1.73sq m  Kidney failure:   <15 mL/min/1.73sq m              eGFR calculated using average adult body mass. Additional eGFR calculator available at:        [email or web address]            GFR Staging 07/14/2020 NOT REPORTED   Final    Vit D, 25-Hydroxy 07/14/2020 58.5  30.0 - 100.0 ng/mL Final    Comment:    Reference Range:  Vitamin D status         Range   Deficiency              <20 ng/mL   Mild Deficiency       20-30 ng/mL   Sufficiency           ng/mL   Toxicity               >100 ng/mL      Hemoglobin A1C 07/14/2020 5.5  4.8 - 5.9 % Final    Estimated Avg Glucose 07/14/2020 111  mg/dL Final         Assessment & Plan:     1. Chest pain, unspecified type      2. Gastroesophageal reflux disease without esophagitis      3. Essential hypertension    - metoprolol succinate (TOPROL XL) 25 MG extended release tablet; Take 1 tablet by mouth daily  Dispense: 90 tablet; Refill: 1    4. Type 2 diabetes mellitus with other circulatory complication, without long-term current use of insulin ([facility])    -  DIABETES FOOT EXAM    5. Reactive depression      6. Hyperlipidemia, unspecified hyperlipidemia type      7. Impaired fasting glucose      8. Elevated PSA  Refuses to follow up with urology  Strongly encouraged to follow up with urology Dr. Eastman or Jeanmarie alva he refuses      9.  Erectile dysfunction, unspecified erectile dysfunction type      Lifestyle changes  Reviewed recent labs  Continue to work on lifestyle changes  Answered his questions  Follow up six months sooner If problems  Refuses to follow up with urology  Coping mechanisms  Recommend flu shot this Fall  Repeat lipids fasting six months  cmp microalbumin urine [facility] tsh psa six months fasting    [address], [place]  7/21/2020 1:16 PM EDT    (Pleasenote that portions of this note were completed with a voice recognition program.Efforts were made to edit the dictations but occasionally words are mis-transcribed.)

## 2020-07-30 ENCOUNTER — HOSPITAL ENCOUNTER (OUTPATIENT)
Age: 60
Setting detail: SPECIMEN
Discharge: HOME | End: 2020-07-30

## 2020-07-30 ENCOUNTER — OFFICE VISIT (OUTPATIENT)
Dept: FAMILY MEDICINE CLINIC | Age: 60
End: 2020-07-30

## 2020-07-30 VITALS
HEIGHT: 65 IN | HEART RATE: 72 BPM | BODY MASS INDEX: 28.66 KG/M2 | WEIGHT: 172 LBS | SYSTOLIC BLOOD PRESSURE: 132 MMHG | TEMPERATURE: 97.8 F | DIASTOLIC BLOOD PRESSURE: 80 MMHG

## 2020-07-30 LAB
-: ABNORMAL
AMORPHOUS: ABNORMAL
BACTERIA: ABNORMAL
BILIRUBIN URINE: NEGATIVE
CASTS UA: ABNORMAL /LPF (ref 0–2)
CASTS UA: ABNORMAL /LPF (ref 0–2)
COLOR: NORMAL
COMMENT UA: NORMAL
CRYSTALS, UA: ABNORMAL /HPF
EPITHELIAL CELLS UA: ABNORMAL /HPF (ref 0–5)
GLUCOSE URINE: NEGATIVE
KETONES, URINE: NEGATIVE
LEUKOCYTE ESTERASE, URINE: NEGATIVE
MUCUS: ABNORMAL
NITRITE, URINE: NEGATIVE
OTHER OBSERVATIONS UA: ABNORMAL
PH UA: 5.5 (ref 5–6)
PROTEIN UA: NEGATIVE
RBC UA: ABNORMAL /HPF (ref 0–4)
RENAL EPITHELIAL, UA: ABNORMAL /HPF
S PYO AG THROAT QL: NORMAL
SPECIFIC GRAVITY UA: 1.02 (ref 1.01–1.02)
TRICHOMONAS: ABNORMAL
TURBIDITY: NORMAL
URINE HGB: NEGATIVE
UROBILINOGEN, URINE: NORMAL
WBC UA: ABNORMAL /HPF (ref 0–4)
YEAST: ABNORMAL

## 2020-07-30 PROCEDURE — 81001 URINALYSIS AUTO W/SCOPE: CPT

## 2020-07-30 PROCEDURE — 99213 OFFICE O/P EST LOW 20 MIN: CPT | Performed by: PHYSICIAN ASSISTANT

## 2020-07-30 PROCEDURE — 87880 STREP A ASSAY W/OPTIC: CPT | Performed by: PHYSICIAN ASSISTANT

## 2020-07-30 PROCEDURE — 99214 OFFICE O/P EST MOD 30 MIN: CPT | Performed by: PHYSICIAN ASSISTANT

## 2020-07-30 RX ORDER — METHYLPREDNISOLONE 4 MG/1
TABLET ORAL
Qty: 1 KIT | Refills: 0 | Status: SHIPPED | OUTPATIENT
Start: 2020-07-30 | End: 2020-08-05

## 2020-07-30 ASSESSMENT — ENCOUNTER SYMPTOMS
SHORTNESS OF BREATH: 0
CONSTIPATION: 0
VOMITING: 0
CHEST TIGHTNESS: 0
COUGH: 0
BACK PAIN: 1
SORE THROAT: 1
DIARRHEA: 0
TROUBLE SWALLOWING: 0
NAUSEA: 0

## 2020-07-30 NOTE — PROGRESS NOTES
[facility]    Subjective:      Patient ID: Elvira Barajas is a 63 y.o. y.o. male. Patient is seen due to low thoracic pain left flank pain for one week. 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 He moved a dresser too. No heat ice but did take motrin 800 mg it helps some not completely. No rash. No NTW. It is keeping him awake. Hurts if rolls over pain shoots thru. No urinary sx. No hematuria.            Past Medical History:   Diagnosis Date    Anxiety     Depression     Erectile dysfunction     Fatigue     Hyperlipidemia     Hypertension     Impaired fasting glucose     Memory problem     Sleep disorder     Tremor        Past Surgical History:   Procedure Laterality Date    OTHER SURGICAL HISTORY      lipoma removed from neck       Family History   Problem Relation Age of Onset    Heart Disease Father         Heart Attack    Cancer Sister 46        Lung Cancer    Other Maternal Grandfather         COPD smoker    Other Paternal Grandmother         COPD       Allergies   Allergen Reactions    Niaspan [Niacin] Hives       Current Outpatient Medications   Medication Sig Dispense Refill    metoprolol succinate (TOPROL XL) 25 MG extended release tablet TAKE 1 TABLET BY MOUTH ONE TIME A DAY 30 tablet 5    glimepiride (AMARYL) 2 MG tablet TAKE 1 TABLET BY MOUTH EVERY MORNING 90 tablet 0    tamsulosin (FLOMAX) 0.4 MG capsule TAKE 1 CAPSULE BY MOUTH ONE TIME DAILY 30 capsule 5    Cholecalciferol (VITAMIN D3 PO) Take 5,000 Units by mouth      omeprazole (PRILOSEC) 40 MG delayed release capsule Take 1 capsule by mouth daily 30 capsule 3    ALLERGY RELIEF 10 MG tablet TAKE 1 TABLET BY MOUTH ONE TIME DAILY 30 tablet 5    enalapril (VASOTEC) 20 MG tablet TAKE 1 TABLET BY MOUTH ONCE DAILY 90 tablet 2    metFORMIN (GLUCOPHAGE) 500 MG tablet TAKE 1 TABLET BY MOUTH TWICE DAILY WITH MEALS 180 tablet 2    ibuprofen (ADVIL;MOTRIN) 800 MG tablet Take 1 tablet by mouth every 8 hours as needed for Pain 30 tablet 5    glucose blood VI test strips (ASCENSIA AUTODISC VI;ONE TOUCH ULTRA TEST VI) strip 1 each by In Vitro route daily As needed. 100 each 3     No current facility-administered medications for this visit. Review of Systems   Constitutional: Positive for activity change. Negative for appetite change, chills, fatigue and fever. HENT: Positive for sore throat. Negative for postnasal drip, sneezing and trouble swallowing. Has a sore throat it has been a few weeks. Does not really hurt to swallow. No heartburn. Denies runny nose cough or cold sx. No change in taste or smell. Respiratory: Negative for cough, chest tightness and shortness of breath. Cardiovascular: Negative for chest pain and palpitations. Gastrointestinal: Negative for constipation, diarrhea, nausea and vomiting. Thinks Coke give diarrhea. Genitourinary: Positive for flank pain. Negative for difficulty urinating. Musculoskeletal: Positive for back pain. Negative for myalgias. Skin: Negative for rash. Neurological: Negative for dizziness, weakness, light-headedness, numbness and headaches. Psychiatric/Behavioral: Positive for sleep disturbance. The patient is not nervous/anxious. Objective:      /80 (Site: Right Upper Arm, Position: Sitting, Cuff Size: Medium Adult)   Pulse 72   Temp 97.8 °F (36.6 °C)   Ht 5' 5\" (1.651 m)   Wt 172 lb (78 kg)   BMI 28.62 kg/m²     Physical Exam  Vitals signs and nursing note reviewed. Constitutional:       General: He is not in acute distress. Appearance: Normal appearance. He is well-developed. He is not ill-appearing. HENT:      Head: Normocephalic and atraumatic. Right Ear: Tympanic membrane, ear canal and external ear normal.      Left Ear: Tympanic membrane, ear canal and external ear normal.      Nose: Nose normal. No congestion or rhinorrhea.       Mouth/Throat:      Mouth: Mucous membranes are moist.      Pharynx: Posterior oropharyngeal erythema present. No oropharyngeal exudate. Eyes:      General: No scleral icterus. Left eye: No discharge. Conjunctiva/sclera: Conjunctivae normal.   Neck:      Musculoskeletal: Normal range of motion and neck supple. No neck rigidity or muscular tenderness. Thyroid: No thyroid mass, thyromegaly or thyroid tenderness. Vascular: No carotid bruit. Cardiovascular:      Rate and Rhythm: Normal rate and regular rhythm. Heart sounds: Normal heart sounds. No murmur. No gallop. Pulmonary:      Effort: Pulmonary effort is normal. No respiratory distress. Breath sounds: Normal breath sounds. No wheezing, rhonchi or rales. Abdominal:      General: Bowel sounds are normal. There is no distension. Palpations: Abdomen is soft. There is no mass. Tenderness: There is no abdominal tenderness. There is left CVA tenderness. There is no right CVA tenderness, guarding or rebound. Hernia: No hernia is present. Musculoskeletal: Normal range of motion. General: Tenderness present. No swelling or signs of injury. Right lower leg: No edema. Left lower leg: No edema. Comments: Has pain in the left flank area and the left posterior SI joint. No pain over the thoracic or LS spine. No gluteal pain. Mild spasm noted in the left lower thoracic and LS area. Lymphadenopathy:      Cervical: No cervical adenopathy. Skin:     General: Skin is warm and dry. Findings: No bruising, erythema or rash. Neurological:      General: No focal deficit present. Mental Status: He is alert and oriented to person, place, and time. Sensory: No sensory deficit. Motor: No weakness. Gait: Gait normal.      Comments: Nl strength and sensation lower extremities bilaterally. No radicular pain noted with straight leg or knee raise. Has nl gait.    Psychiatric:         Mood and Affect: Mood normal. Behavior: Behavior normal.       Results for orders placed or performed in visit on 07/30/20   POCT rapid strep A   Result Value Ref Range    Strep A Ag None Detected None Detected     Hospital Outpatient Visit on 07/30/2020   Component Date Value Ref Range Status    Color, UA 07/30/2020 NOT REPORTED  YELLOW Final    Turbidity UA 07/30/2020 NOT REPORTED  CLEAR Final    Glucose, Ur 07/30/2020 NEGATIVE  NEGATIVE Final    Bilirubin Urine 07/30/2020 NEGATIVE  NEGATIVE Final    Ketones, Urine 07/30/2020 NEGATIVE  NEGATIVE Final    Specific Gravity, UA 07/30/2020 1.025  1.010 - 1.025 Final    Urine Hgb 07/30/2020 NEGATIVE  NEGATIVE Final    pH, UA 07/30/2020 5.5  5.0 - 6.0 Final    Protein, UA 07/30/2020 NEGATIVE  NEGATIVE Final    Urobilinogen, Urine 07/30/2020 Normal  Normal Final    Nitrite, Urine 07/30/2020 NEGATIVE  NEGATIVE Final    Leukocyte Esterase, Urine 07/30/2020 NEGATIVE  NEGATIVE Final    Urinalysis Comments 07/30/2020 NOT REPORTED   Final    - 07/30/2020        Final    WBC, UA 07/30/2020 0 TO 4  0 - 4 /HPF Final    RBC, UA 07/30/2020 0 TO 4  0 - 4 /HPF Final    Casts UA 07/30/2020 0 TO 4  0 - 2 /LPF Final    Casts UA 07/30/2020 HYALINE  0 - 2 /LPF Final    Crystals, UA 07/30/2020 NOT REPORTED  None /HPF Final    Epithelial Cells UA 07/30/2020 0 TO 4  0 - 5 /HPF Final    Renal Epithelial, UA 07/30/2020 NOT REPORTED  0 /HPF Final    Bacteria, UA 07/30/2020 None  None Final    Mucus, UA 07/30/2020 1+* None Final    Trichomonas, UA 07/30/2020 NOT REPORTED  None Final    Amorphous, UA 07/30/2020 NOT REPORTED  None Final    Other Observations UA 07/30/2020 NOT REPORTED  NOT REQ.  Final    Yeast, UA 07/30/2020 NOT REPORTED  None Final   Office Visit on 07/30/2020   Component Date Value Ref Range Status    Strep A Ag 07/30/2020 None Detected  None Detected Final   Hospital Outpatient Visit on 07/14/2020   Component Date Value Ref Range Status    Cholesterol 07/14/2020 166  <200 mg/dL Final    Comment:    Cholesterol Guidelines:      <200  Desirable   200-240  Borderline      >240  Undesirable         HDL 07/14/2020 44  >40 mg/dL Final    Comment:    HDL Guidelines:    <40     Undesirable   40-59    Borderline    >59     Desirable         LDL Cholesterol 07/14/2020 107  0 - 130 mg/dL Final    Comment:    LDL Guidelines:     <100    Desirable   100-129   Near to/above Desirable   130-159   Borderline      >159   Undesirable     Direct (measured) LDL and calculated LDL are not interchangeable tests.  Chol/HDL Ratio 07/14/2020 3.8  <5 Final            Triglycerides 07/14/2020 77  <150 mg/dL Final    Comment:    Triglyceride Guidelines:     <150   Desirable   150-199  Borderline   200-499  High     >499   Very high   Based on AHA Guidelines for fasting triglyceride, October 2012.          VLDL 07/14/2020 NOT REPORTED  1 - 30 mg/dL Final    Glucose 07/14/2020 130* 70 - 99 mg/dL Final    BUN 07/14/2020 14  8 - 23 mg/dL Final    CREATININE 07/14/2020 1.03  0.70 - 1.20 mg/dL Final    Bun/Cre Ratio 07/14/2020 14  9 - 20 Final    Calcium 07/14/2020 9.4  8.6 - 10.4 mg/dL Final    Sodium 07/14/2020 142  135 - 144 mmol/L Final    Potassium 07/14/2020 4.6  3.7 - 5.3 mmol/L Final    Chloride 07/14/2020 102  98 - 107 mmol/L Final    CO2 07/14/2020 26  20 - 31 mmol/L Final    Anion Gap 07/14/2020 14  9 - 17 mmol/L Final    Alkaline Phosphatase 07/14/2020 69  40 - 129 U/L Final    ALT 07/14/2020 32  5 - 41 U/L Final    AST 07/14/2020 21  <40 U/L Final    Total Bilirubin 07/14/2020 0.67  0.3 - 1.2 mg/dL Final    Total Protein 07/14/2020 7.0  6.4 - 8.3 g/dL Final    Alb 07/14/2020 4.7  3.5 - 5.2 g/dL Final    Albumin/Globulin Ratio 07/14/2020 2.0  1.0 - 2.5 Final    GFR Non- 07/14/2020 >60  >60 mL/min Final    GFR  07/14/2020 >60  >60 mL/min Final    GFR Comment 07/14/2020        Final    Comment: Average GFR for 61-76 years old:   80 mL/min/1.73sq

## 2020-08-12 RX ORDER — GLIMEPIRIDE 2 MG/1
TABLET ORAL
Qty: 90 TABLET | Refills: 1 | Status: SHIPPED | OUTPATIENT
Start: 2020-08-12 | End: 2021-07-15

## 2020-09-15 ENCOUNTER — HOSPITAL ENCOUNTER (OUTPATIENT)
Dept: GENERAL RADIOLOGY | Age: 60
Discharge: HOME | End: 2020-09-17

## 2020-09-15 ENCOUNTER — OFFICE VISIT (OUTPATIENT)
Dept: FAMILY MEDICINE CLINIC | Age: 60
End: 2020-09-15

## 2020-09-15 VITALS
SYSTOLIC BLOOD PRESSURE: 122 MMHG | WEIGHT: 173 LBS | HEART RATE: 71 BPM | TEMPERATURE: 96.9 F | BODY MASS INDEX: 28.82 KG/M2 | OXYGEN SATURATION: 98 % | HEIGHT: 65 IN | DIASTOLIC BLOOD PRESSURE: 72 MMHG

## 2020-09-15 PROCEDURE — 99212 OFFICE O/P EST SF 10 MIN: CPT

## 2020-09-15 PROCEDURE — 99213 OFFICE O/P EST LOW 20 MIN: CPT | Performed by: FAMILY MEDICINE

## 2020-09-15 PROCEDURE — 72100 X-RAY EXAM L-S SPINE 2/3 VWS: CPT

## 2020-09-15 PROCEDURE — 96372 THER/PROPH/DIAG INJ SC/IM: CPT

## 2020-09-15 RX ORDER — LORATADINE 10 MG/1
TABLET ORAL
Qty: 30 TABLET | Refills: 5 | Status: SHIPPED | OUTPATIENT
Start: 2020-09-15 | End: 2021-03-08 | Stop reason: SDUPTHER

## 2020-09-15 RX ORDER — IBUPROFEN 800 MG/1
800 TABLET ORAL EVERY 8 HOURS PRN
Qty: 90 TABLET | Refills: 1 | Status: CANCELLED | OUTPATIENT
Start: 2020-09-15

## 2020-09-15 RX ORDER — KETOROLAC TROMETHAMINE 30 MG/ML
30 INJECTION, SOLUTION INTRAMUSCULAR; INTRAVENOUS ONCE
Status: COMPLETED | OUTPATIENT
Start: 2020-09-15 | End: 2020-09-15

## 2020-09-15 RX ORDER — CELECOXIB 200 MG/1
200 CAPSULE ORAL DAILY
Qty: 30 CAPSULE | Refills: 0 | Status: SHIPPED | OUTPATIENT
Start: 2020-09-15 | End: 2021-07-15

## 2020-09-15 RX ORDER — CYCLOBENZAPRINE HCL 5 MG
5 TABLET ORAL 2 TIMES DAILY PRN
Qty: 14 TABLET | Refills: 0 | Status: SHIPPED | OUTPATIENT
Start: 2020-09-15 | End: 2020-09-22

## 2020-09-15 RX ADMIN — KETOROLAC TROMETHAMINE 30 MG: 30 INJECTION, SOLUTION INTRAMUSCULAR; INTRAVENOUS at 10:39

## 2020-09-15 NOTE — PROGRESS NOTES
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 No radiation of pain to the legs. ,no bowel or bladder dysfunction. No prior back injuries or surgeries but has had pain on and off in past had been seeing chiroprcator. Alonzo Epperson      REVIEW OF SYSTEMS:  Cardio: No chest pain, palpitations, TURNER, edema, PND  Pulmonary: No cough, hemoptysis, SOB  GI: No nausea, vomiting, dysphagia, odynophagia, diarrhea,constipation  : No dysuria, hematuria, urgency, incontinence  Past Medical History:   Diagnosis Date    Anxiety     Depression     Erectile dysfunction     Fatigue     Hyperlipidemia     Hypertension     Impaired fasting glucose     Memory problem     Sleep disorder     Tremor        Current Outpatient Medications   Medication Sig Dispense Refill    metFORMIN (GLUCOPHAGE) 500 MG tablet TAKE 1 TABLET BY MOUTH TWO TIMES A DAY WITH MEALS 180 tablet 1    glimepiride (AMARYL) 2 MG tablet TAKE ONE TABLET BY MOUTH EVERY MORNING 90 tablet 1    metoprolol succinate (TOPROL XL) 25 MG extended release tablet TAKE 1 TABLET BY MOUTH ONE TIME A DAY 30 tablet 5    tamsulosin (FLOMAX) 0.4 MG capsule TAKE 1 CAPSULE BY MOUTH ONE TIME DAILY 30 capsule 5    Cholecalciferol (VITAMIN D3 PO) Take 5,000 Units by mouth      omeprazole (PRILOSEC) 40 MG delayed release capsule Take 1 capsule by mouth daily 30 capsule 3    ALLERGY RELIEF 10 MG tablet TAKE 1 TABLET BY MOUTH ONE TIME DAILY 30 tablet 5    enalapril (VASOTEC) 20 MG tablet TAKE 1 TABLET BY MOUTH ONCE DAILY 90 tablet 2    ibuprofen (ADVIL;MOTRIN) 800 MG tablet Take 1 tablet by mouth every 8 hours as needed for Pain 30 tablet 5    glucose blood VI test strips (ASCENSIA

## 2020-10-05 ENCOUNTER — IMMUNIZATION (OUTPATIENT)
Dept: LAB | Age: 60
End: 2020-10-05

## 2020-10-05 PROCEDURE — 90686 IIV4 VACC NO PRSV 0.5 ML IM: CPT

## 2020-10-05 RX ORDER — ENALAPRIL MALEATE 20 MG/1
TABLET ORAL
Qty: 90 TABLET | Refills: 2 | Status: SHIPPED | OUTPATIENT
Start: 2020-10-05 | End: 2021-07-12

## 2020-10-05 NOTE — TELEPHONE ENCOUNTER
Last Appt:  9/15/2020  Next Appt:   1/15/2021  Med verified in [facility]

## 2020-10-12 RX ORDER — TAMSULOSIN HYDROCHLORIDE 0.4 MG/1
CAPSULE ORAL
Qty: 90 CAPSULE | Refills: 1 | Status: SHIPPED | OUTPATIENT
Start: 2020-10-12 | End: 2021-04-27 | Stop reason: SDUPTHER

## 2020-10-13 ENCOUNTER — TELEPHONE (OUTPATIENT)
Dept: FAMILY MEDICINE CLINIC | Age: 60
End: 2020-10-13

## 2020-10-13 NOTE — TELEPHONE ENCOUNTER
----- Message from Kelly [place] sent at 10/12/2020 11:53 AM EDT -----  Patient emailed me over weekend regarding concerns on his metformin. He is fine he is on regular strength. The recall is on extended release only. Please notify pt.   thanks

## 2021-01-11 ENCOUNTER — TELEPHONE (OUTPATIENT)
Dept: FAMILY MEDICINE CLINIC | Age: 61
End: 2021-01-11

## 2021-01-11 NOTE — TELEPHONE ENCOUNTER
Pt calling stating he has an appt Friday and wants a lipid, sugar and UA ordered prior to appt, please advise

## 2021-01-12 ENCOUNTER — HOSPITAL ENCOUNTER (OUTPATIENT)
Dept: LAB | Age: 61
Discharge: HOME | End: 2021-01-12

## 2021-01-12 DIAGNOSIS — E78.5 HYPERLIPIDEMIA, UNSPECIFIED HYPERLIPIDEMIA TYPE: ICD-10-CM

## 2021-01-12 DIAGNOSIS — E11.65 UNCONTROLLED TYPE 2 DIABETES MELLITUS WITH HYPERGLYCEMIA (HCC): ICD-10-CM

## 2021-01-12 DIAGNOSIS — E11.65 UNCONTROLLED TYPE 2 DIABETES MELLITUS WITH HYPERGLYCEMIA (HCC): Primary | ICD-10-CM

## 2021-01-12 LAB
ALBUMIN SERPL-MCNC: 4.5 G/DL (ref 3.5–5.2)
ALBUMIN/GLOBULIN RATIO: 2 (ref 1–2.5)
ALP BLD-CCNC: 68 U/L (ref 40–129)
ALT SERPL-CCNC: 36 U/L (ref 5–41)
ANION GAP SERPL CALCULATED.3IONS-SCNC: 10 MMOL/L (ref 9–17)
AST SERPL-CCNC: 26 U/L
BILIRUB SERPL-MCNC: 0.62 MG/DL (ref 0.3–1.2)
BUN BLDV-MCNC: 13 MG/DL (ref 8–23)
BUN/CREAT BLD: 14 (ref 9–20)
CALCIUM SERPL-MCNC: 9.6 MG/DL (ref 8.6–10.4)
CHLORIDE BLD-SCNC: 100 MMOL/L (ref 98–107)
CHOLESTEROL/HDL RATIO: 3.7
CHOLESTEROL: 163 MG/DL
CO2: 28 MMOL/L (ref 20–31)
CREAT SERPL-MCNC: 0.96 MG/DL (ref 0.7–1.2)
ESTIMATED AVERAGE GLUCOSE: 105 MG/DL
GFR AFRICAN AMERICAN: >60 ML/MIN
GFR NON-AFRICAN AMERICAN: >60 ML/MIN
GFR SERPL CREATININE-BSD FRML MDRD: ABNORMAL ML/MIN/{1.73_M2}
GFR SERPL CREATININE-BSD FRML MDRD: ABNORMAL ML/MIN/{1.73_M2}
GLUCOSE BLD-MCNC: 130 MG/DL (ref 70–99)
HBA1C MFR BLD: 5.3 % (ref 4–6)
HDLC SERPL-MCNC: 44 MG/DL
LDL CHOLESTEROL: 97 MG/DL (ref 0–130)
POTASSIUM SERPL-SCNC: 4.3 MMOL/L (ref 3.7–5.3)
SODIUM BLD-SCNC: 138 MMOL/L (ref 135–144)
TOTAL PROTEIN: 6.8 G/DL (ref 6.4–8.3)
TRIGL SERPL-MCNC: 110 MG/DL
VLDLC SERPL CALC-MCNC: NORMAL MG/DL (ref 1–30)

## 2021-01-12 PROCEDURE — 83036 HEMOGLOBIN GLYCOSYLATED A1C: CPT

## 2021-01-12 PROCEDURE — 36415 COLL VENOUS BLD VENIPUNCTURE: CPT

## 2021-01-12 PROCEDURE — 80061 LIPID PANEL: CPT

## 2021-01-12 PROCEDURE — 80053 COMPREHEN METABOLIC PANEL: CPT

## 2021-01-15 ENCOUNTER — OFFICE VISIT (OUTPATIENT)
Dept: FAMILY MEDICINE CLINIC | Age: 61
End: 2021-01-15

## 2021-01-15 VITALS
SYSTOLIC BLOOD PRESSURE: 132 MMHG | BODY MASS INDEX: 26.52 KG/M2 | HEIGHT: 68 IN | OXYGEN SATURATION: 99 % | DIASTOLIC BLOOD PRESSURE: 78 MMHG | WEIGHT: 175 LBS | HEART RATE: 91 BPM | RESPIRATION RATE: 14 BRPM

## 2021-01-15 DIAGNOSIS — E78.5 HYPERLIPIDEMIA, UNSPECIFIED HYPERLIPIDEMIA TYPE: ICD-10-CM

## 2021-01-15 DIAGNOSIS — F32.9 REACTIVE DEPRESSION: ICD-10-CM

## 2021-01-15 DIAGNOSIS — K21.9 GASTROESOPHAGEAL REFLUX DISEASE WITHOUT ESOPHAGITIS: Primary | ICD-10-CM

## 2021-01-15 DIAGNOSIS — I10 ESSENTIAL HYPERTENSION, BENIGN: ICD-10-CM

## 2021-01-15 DIAGNOSIS — E11.59 TYPE 2 DIABETES MELLITUS WITH OTHER CIRCULATORY COMPLICATION, WITHOUT LONG-TERM CURRENT USE OF INSULIN (HCC): ICD-10-CM

## 2021-01-15 PROCEDURE — 99214 OFFICE O/P EST MOD 30 MIN: CPT | Performed by: PHYSICIAN ASSISTANT

## 2021-01-15 PROCEDURE — 99211 OFF/OP EST MAY X REQ PHY/QHP: CPT

## 2021-01-15 RX ORDER — OMEPRAZOLE 40 MG/1
40 CAPSULE, DELAYED RELEASE ORAL
Qty: 90 CAPSULE | Refills: 1 | Status: SHIPPED | OUTPATIENT
Start: 2021-01-15 | End: 2021-07-15

## 2021-01-15 RX ORDER — CYCLOBENZAPRINE HCL 5 MG
5 TABLET ORAL 3 TIMES DAILY PRN
COMMUNITY
End: 2021-07-15

## 2021-01-15 ASSESSMENT — ENCOUNTER SYMPTOMS
COUGH: 1
NAUSEA: 0
DIARRHEA: 0
VOMITING: 0

## 2021-01-15 ASSESSMENT — PATIENT HEALTH QUESTIONNAIRE - PHQ9
SUM OF ALL RESPONSES TO PHQ QUESTIONS 1-9: 0
1. LITTLE INTEREST OR PLEASURE IN DOING THINGS: 0

## 2021-01-15 NOTE — PROGRESS NOTES
[facility]    Subjective:      Patient ID: Ari Willoughby is a 63 y.o. y.o. male. Patient is seen following up on diabetes and other health issues. Needs refills on prilosec. Had back pain and took flexeril and celebrex and feeling better. He is done painting at home and feels better too. He also sanded his bedroom floor too. The back is better now. Taking vitamin D 5000 units every other day.          Past Medical History:   Diagnosis Date    Anxiety     Depression     Erectile dysfunction     Fatigue     Hyperlipidemia     Hypertension     Impaired fasting glucose     Memory problem     Sleep disorder     Tremor        Past Surgical History:   Procedure Laterality Date    OTHER SURGICAL HISTORY      lipoma removed from neck       Family History   Problem Relation Age of Onset    Heart Disease Father         Heart Attack    Cancer Sister 46        Lung Cancer    Other Maternal Grandfather         COPD smoker    Other Paternal Grandmother         COPD       Allergies   Allergen Reactions    Niaspan [Niacin] Hives       Current Outpatient Medications   Medication Sig Dispense Refill    cyclobenzaprine (FLEXERIL) 5 MG tablet Take 5 mg by mouth 3 times daily as needed for Muscle spasms      omeprazole (PRILOSEC) 40 MG delayed release capsule Take 1 capsule by mouth every morning (before breakfast) 90 capsule 1    tamsulosin (FLOMAX) 0.4 MG capsule TAKE 1 CAPSULE BY MOUTH ONE TIME A DAY 90 capsule 1    enalapril (VASOTEC) 20 MG tablet TAKE 1 TABLET BY MOUTH ONE TIME DAILY 90 tablet 2    ALLERGY RELIEF 10 MG tablet TAKE 1 TABLET BY MOUTH ONE TIME A DAY 30 tablet 5    celecoxib (CELEBREX) 200 MG capsule Take 1 capsule by mouth daily 30 capsule 0    metFORMIN (GLUCOPHAGE) 500 MG tablet TAKE 1 TABLET BY MOUTH TWO TIMES A DAY WITH MEALS 180 tablet 1    glimepiride (AMARYL) 2 MG tablet TAKE ONE TABLET BY MOUTH EVERY MORNING 90 tablet 1  metoprolol succinate (TOPROL XL) 25 MG extended release tablet TAKE 1 TABLET BY MOUTH ONE TIME A DAY 30 tablet 5    Cholecalciferol (VITAMIN D3 PO) Take 5,000 Units by mouth      omeprazole (PRILOSEC) 40 MG delayed release capsule Take 1 capsule by mouth daily 30 capsule 3    glucose blood VI test strips (ASCENSIA AUTODISC VI;ONE TOUCH ULTRA TEST VI) strip 1 each by In Vitro route daily As needed. 100 each 3     No current facility-administered medications for this visit. Review of Systems   Constitutional: Negative for appetite change, chills, fatigue and fever. HENT: Positive for postnasal drip. Negative for congestion. Goes to dentist every six months. Slight PND noted. Eyes:        STACIE 2 years ago. Dr. Fabi Maher. Respiratory: Positive for cough. Coughs in am ? Dryness. Cardiovascular: Negative for chest pain and palpitations. Gastrointestinal: Negative for diarrhea, nausea and vomiting. Genitourinary: Negative. Musculoskeletal: Negative for myalgias. Skin: Negative for rash. Neurological: Negative for dizziness, light-headedness, numbness and headaches. Psychiatric/Behavioral: Negative for sleep disturbance. The patient is not nervous/anxious. Objective:      /78   Pulse 91   Resp 14   Ht 5' 8\" (1.727 m)   Wt 175 lb (79.4 kg)   SpO2 99%   BMI 26.61 kg/m²     Physical Exam  Vitals signs and nursing note reviewed. Constitutional:       General: He is not in acute distress. Appearance: Normal appearance. He is well-developed. He is not ill-appearing. HENT:      Head: Normocephalic and atraumatic. Right Ear: Tympanic membrane, ear canal and external ear normal.      Left Ear: Tympanic membrane, ear canal and external ear normal.      Nose: Nose normal. No congestion or rhinorrhea. Mouth/Throat:      Mouth: Mucous membranes are moist.      Pharynx: No oropharyngeal exudate or posterior oropharyngeal erythema.    Eyes: General: No scleral icterus. Conjunctiva/sclera: Conjunctivae normal.   Neck:      Musculoskeletal: Normal range of motion and neck supple. No neck rigidity or muscular tenderness. Vascular: No carotid bruit. Cardiovascular:      Rate and Rhythm: Normal rate and regular rhythm. Heart sounds: Normal heart sounds. No murmur. No gallop. Pulmonary:      Effort: Pulmonary effort is normal. No respiratory distress. Breath sounds: Normal breath sounds. No wheezing, rhonchi or rales. Abdominal:      General: Bowel sounds are normal. There is no distension. Palpations: Abdomen is soft. There is no mass. Tenderness: There is no abdominal tenderness. There is no guarding or rebound. Hernia: No hernia is present. Musculoskeletal:         General: No swelling, tenderness, deformity or signs of injury. Right lower leg: No edema. Left lower leg: No edema. Lymphadenopathy:      Cervical: No cervical adenopathy. Skin:     General: Skin is warm and dry. Findings: No erythema or rash. Neurological:      General: No focal deficit present. Mental Status: He is alert and oriented to person, place, and time. Sensory: No sensory deficit. Gait: Gait normal.   Psychiatric:         Mood and Affect: Mood normal.         Behavior: Behavior normal.         Thought Content: Thought content normal.         Judgment: Judgment normal.       Hospital Outpatient Visit on 01/12/2021   Component Date Value Ref Range Status    Hemoglobin A1C 01/12/2021 5.3  4.0 - 6.0 % Final    Estimated Avg Glucose 01/12/2021 105  mg/dL Final    Comment: The ADA and AACC recommend providing the estimated average glucose result to permit better   patient understanding of their HBA1c result.       Glucose 01/12/2021 130* 70 - 99 mg/dL Final    BUN 01/12/2021 13  8 - 23 mg/dL Final    CREATININE 01/12/2021 0.96  0.70 - 1.20 mg/dL Final    Bun/Cre Ratio 01/12/2021 14  9 - 20 Final >499   Very high   Based on AHA Guidelines for fasting triglyceride, October 2012.  VLDL 01/12/2021 NOT REPORTED  1 - 30 mg/dL Final         Assessment & Plan:     1. Gastroesophageal reflux disease without esophagitis    - omeprazole (PRILOSEC) 40 MG delayed release capsule; Take 1 capsule by mouth every morning (before breakfast)  Dispense: 90 capsule; Refill: 1    2. Type 2 diabetes mellitus with other circulatory complication, without long-term current use of insulin ([facility])  Well controlled    - Hemoglobin A1C; Future  - Basic Metabolic Panel; Future    3. Hyperlipidemia, unspecified hyperlipidemia type      4. Essential hypertension, benign  controlled    5.  Reactive depression  stable      Go to 3000 units daily vitamin D 3  Answered questions  shingrix is not covered can not afford  Reviewed recent labs  Fasting labs once a year  Can stop amaryl he will take every other day to get rid of his supply and then stop  Monitor glucose if any problems call the office and let me know  Recheck bmp [facility] six months rest fasting labs one year  Congratulated him on his lifestyle and behavior changes  Follow up six months sooner if problems  Discussed covid LILA Matos  1/19/2021 2:02 PM EST    (Pleasenote that portions of this note were completed with a voice recognition program.Efforts were made to edit the dictations but occasionally words are mis-transcribed.)

## 2021-02-17 NOTE — TELEPHONE ENCOUNTER
Aurelia Juan called requesting a refill of the below medication which has been pended for you:     Requested Prescriptions     Pending Prescriptions Disp Refills    metFORMIN (GLUCOPHAGE) 500 MG tablet [Pharmacy Med Name: METFORMIN HCL 500MG TABS] 180 tablet 1     Sig: TAKE 1 TABLET BY MOUTH 2 TIMES A DAY WITH MEALS       Last Appointment Date: 1/15/2021  Next Appointment Date: 7/15/2021    Allergies   Allergen Reactions    Niaspan [Niacin] Hives

## 2021-03-08 DIAGNOSIS — J30.1 SEASONAL ALLERGIC RHINITIS DUE TO POLLEN: ICD-10-CM

## 2021-03-08 RX ORDER — LORATADINE 10 MG/1
10 TABLET ORAL DAILY
Qty: 30 TABLET | Refills: 0 | Status: SHIPPED | OUTPATIENT
Start: 2021-03-08 | End: 2021-03-23 | Stop reason: SDUPTHER

## 2021-03-08 NOTE — TELEPHONE ENCOUNTER
Last Appt:  1/15/2021 (SUHAIL fu)  Next Appt:   7/15/2021 (New to [facility])     Med verified in Epic PULMONARY NOTE    Ginny Kennedy MD  Accompanied by .MOM    Patient ID: Giovanni is a 15 year old male Twin A male with history of eczema, AR, severe persistent asthma who presents for followup. Since his last visit about a year half ago, he was multiple URIs. He had 2 ER visits requiring steroids in fall 2018. He has not required antibiotics or hospitalizations. He uses albuterol 2-3 times a week at night. He uses Symbicort 160/4.5 and Symbicort 80/4.5 (interchangably) twice daily on most days. Triggers include URIs, environmental allergens, weather/season changes, and activity. In terms of his baseline, there is daily coughing and wheezing when outside. He has coughing with activity and outdoor allergens. He denies itchy, watery eyes, but has rhinorrhea. He snores, but does not gasp. He does not have any choking, gagging or vomiting.     REVIEW OF SYSTEMS: Systems reviewed and negative unless stated above.    PAST MEDICAL HISTORY:   Past Medical History:   Diagnosis Date   • Eczema    • Twin birth      SURGICAL HISTORY:   Past Surgical History:   Procedure Laterality Date   • No past surgeries       FAMILY HISTORY:   family history includes Asthma in his brother.    SOCIAL HISTORY:   Lives at home with Mother, Father, Siblings, Grandparent and Relative  Smoking: Yes and Secondhand Smoke exposure  Pets: Yes  Environmental Exposures: Humidifiers and Curtains  Attends: School    ALLERGIES: ALLERGIES:  Dust; Grass; No name available; and Pollen     IMMUNIZATIONS:   Immunization History   Administered Date(s) Administered   • HPV Quadrivalent 11/01/2013, 09/22/2014, 09/09/2015   • Influenza, injectable, quadrivalent, preservative-free 09/22/2014, 09/23/2015, 10/24/2017, 11/28/2018   • Influenza, seasonal, injectable, preservative free 11/01/2013   • Meningococcal Conjugate MCV4P (Menactra) 11/01/2013   • Tdap 11/01/2013      Flu Vaccine: Yes    Visit Vitals  /65 (BP Location: [facility], Patient Position: Sitting)    Pulse 95   Temp 98.3 °F (36.8 °C) (Oral)   Resp 18   Ht 5' 11.26\" (1.81 m)   Wt 58.4 kg (128 lb 12 oz)   SpO2 96%   BMI 17.83 kg/m²     Growth percentiles:   82 %ile (Z= 0.92) based on CDC (Boys, 2-20 Years) Stature-for-age data based on Stature recorded on 2/1/2019.  35 %ile (Z= -0.40) based on CDC (Boys, 2-20 Years) weight-for-age data using vitals from 2/1/2019.    PHYSICAL EXAM  General: In NAD  Skin: No eczema, rashes  HEENT: NCAT, PERRL, TMI, normal Nasal mucosa, Oropharynx - MMM, Neck supple, Tonsils 2+ bilaterally  Chest: nl chest wall anatomy and symmetry, good aeration in all lung fields, no retractions, bilateral diffuse wheezing, no crackles  CV: RRR no murmurs, nl cap refill, nl pulses  Abd: soft, NTND, nl BS, no masses  Ext: No cyanosis, clubbing, edema  Neuro: Reflexes 2+/2+ bilaterally.     Pulmonary Function Testing  FEV1:            2.25L         58%  FVC:              3.07L         68%  FEV1/FVC:    4.28%  [id number]:     1.75L/s       41%  Status: Stable  Severity: Moderately Severe  Impairment: Obstruction    ASSESSMENT:  15 year old male Twin A male with history of eczema, AR, severe persistent asthma who presents for followup. He has poor compliance.     PLAN:  Bronchodialator: ProAir HFA/MDI 4 puffs q4-6 horus as needed and 20 mins before activity.   Controller: Symbicort 160/4.5 mcg 2 puffs BID; Singulair 10 mg PO QHS.   Equipment Education: Use with spacer.   Asthma Education: Reviewed rescue usage.   Asthma Action Plan was prepared and reviewed. an Asthma Action Plan was reviewed.   Allergic Rhinitis: Use Claritin daily as needed.     The patient was reminded:  1. Smoking is a deadly habit and should always be avoided.   2. Vaccinate annually against influenza.   3. Return to clinic for persistent or worsening symptoms.   4. The patient should be scheduled for a follow up visit in 3 months.     Sybil Wilburn MD  Pediatric Pulmonology  [facility]

## 2021-03-08 NOTE — TELEPHONE ENCOUNTER
Patient states he is unsure if he even needs an allergy pill anymore and thinks he is going to stop it and see if symptoms return. He states he will call us back if he sees he needs refill.

## 2021-03-08 NOTE — TELEPHONE ENCOUNTER
Pt calling stating his Loratadine was filled for a 30 day supply, pt states he usually gets a 90 day supply for $4.00, states he's not going to stay on his meds if we can only fill for a 30 day supply, please advise at above number.

## 2021-03-23 DIAGNOSIS — J30.1 SEASONAL ALLERGIC RHINITIS DUE TO POLLEN: ICD-10-CM

## 2021-03-23 RX ORDER — LORATADINE 10 MG/1
10 TABLET ORAL DAILY
Qty: 90 TABLET | Refills: 0 | Status: SHIPPED | OUTPATIENT
Start: 2021-03-23 | End: 2021-06-21

## 2021-03-23 NOTE — TELEPHONE ENCOUNTER
Pt calling back stating he's decided he needs this medication and wants a 90 day supply sent to pharmacy.

## 2021-04-27 DIAGNOSIS — R30.0 DYSURIA: Primary | ICD-10-CM

## 2021-04-27 RX ORDER — TAMSULOSIN HYDROCHLORIDE 0.4 MG/1
CAPSULE ORAL
Qty: 90 CAPSULE | Refills: 0 | Status: SHIPPED | OUTPATIENT
Start: 2021-04-27 | End: 2021-07-15

## 2021-04-27 NOTE — TELEPHONE ENCOUNTER
Yaya Her called requesting a refill of the below medication which has been pended for you:     Requested Prescriptions     Pending Prescriptions Disp Refills    tamsulosin (FLOMAX) 0.4 MG capsule 90 capsule 1       Last Appointment Date: 1/15/2021  Next Appointment Date: 7/15/2021    Allergies   Allergen Reactions    Niaspan [Niacin] Hives

## 2021-05-27 ENCOUNTER — OFFICE VISIT (OUTPATIENT)
Dept: FAMILY MEDICINE CLINIC | Age: 61
End: 2021-05-27

## 2021-05-27 VITALS
TEMPERATURE: 97.5 F | HEIGHT: 65 IN | BODY MASS INDEX: 29.62 KG/M2 | SYSTOLIC BLOOD PRESSURE: 130 MMHG | RESPIRATION RATE: 18 BRPM | HEART RATE: 79 BPM | DIASTOLIC BLOOD PRESSURE: 76 MMHG | WEIGHT: 177.8 LBS | OXYGEN SATURATION: 99 %

## 2021-05-27 DIAGNOSIS — K21.9 GASTROESOPHAGEAL REFLUX DISEASE WITHOUT ESOPHAGITIS: Primary | ICD-10-CM

## 2021-05-27 PROCEDURE — 99212 OFFICE O/P EST SF 10 MIN: CPT

## 2021-05-27 PROCEDURE — 99213 OFFICE O/P EST LOW 20 MIN: CPT | Performed by: FAMILY MEDICINE

## 2021-05-27 RX ORDER — FAMOTIDINE 20 MG/1
20 TABLET, FILM COATED ORAL 2 TIMES DAILY
Qty: 60 TABLET | Refills: 1 | Status: SHIPPED | OUTPATIENT
Start: 2021-05-27 | End: 2022-01-18

## 2021-05-27 ASSESSMENT — ENCOUNTER SYMPTOMS
NAUSEA: 0
COUGH: 0
SINUS PRESSURE: 0
ABDOMINAL PAIN: 1
SORE THROAT: 0
CONSTIPATION: 0
SHORTNESS OF BREATH: 0
DIARRHEA: 1

## 2021-05-27 NOTE — PROGRESS NOTES
[facility] [address]  [address] [address]  Dept: [phone]  Dept Fax: [phone]  Loc: [phone]    Jean Marie Gonzáles is a 63 y.o. male who presents today for his medical conditions/complaints as noted below. Jean Marie Gonzáles is c/o of   Chief Complaint   Patient presents with    Chest Congestion     sx started \"a couple days ago\"       HPI:     HPI Here today for chest congestion. He has had a lot of chest congestion for the past few days. He has not been coughing. He has not had any fevers or chills. He is still having some heartburn. No issues with headaches. No ear pain. No sinus congestion. No recent issues with allergies. He has been taking loratadine but he doesn't think its working as well as it used to. At one point when he was seeing Kam Man she suggested having some sort of test done, but he is not sure what that would be. From looking through his chart it looks like maybe an EGD. No issues with shortness of breath. No palpitations.      Past Medical History:   Diagnosis Date    Anxiety     Depression     Erectile dysfunction     Fatigue     Hyperlipidemia     Hypertension     Impaired fasting glucose     Memory problem     Sleep disorder     Tremor           Social History     Tobacco Use    Smoking status: Former Smoker     Packs/day: 2.00     Years: 20.00     Pack years: 40.00     Types: Cigarettes     Start date: 6/15/1974     Quit date: 6/15/1989     Years since quittin.9    Smokeless tobacco: Never Used    Tobacco comment: quit over 25 years ago   Substance Use Topics    Alcohol use: No     Comment: quit 12 years ago     Current Outpatient Medications   Medication Sig Dispense Refill    famotidine (PEPCID) 20 MG tablet Take 1 tablet by mouth 2 times daily 60 tablet 1    tamsulosin (FLOMAX) 0.4 MG capsule TAKE 1 CAPSULE BY MOUTH ONE TIME A DAY 90 capsule 0    loratadine (ALLERGY RELIEF) 10 MG tablet Take 1 tablet by mouth daily 90 tablet 0    metFORMIN (GLUCOPHAGE) 500 MG tablet TAKE 1 TABLET BY MOUTH 2 TIMES A DAY WITH MEALS 180 tablet 1    omeprazole (PRILOSEC) 40 MG delayed release capsule Take 1 capsule by mouth every morning (before breakfast) 90 capsule 1    enalapril (VASOTEC) 20 MG tablet TAKE 1 TABLET BY MOUTH ONE TIME DAILY 90 tablet 2    glimepiride (AMARYL) 2 MG tablet TAKE ONE TABLET BY MOUTH EVERY MORNING 90 tablet 1    metoprolol succinate (TOPROL XL) 25 MG extended release tablet TAKE 1 TABLET BY MOUTH ONE TIME A DAY 30 tablet 5    Cholecalciferol (VITAMIN D3 PO) Take 5,000 Units by mouth      omeprazole (PRILOSEC) 40 MG delayed release capsule Take 1 capsule by mouth daily 30 capsule 3    glucose blood VI test strips (ASCENSIA AUTODISC VI;ONE TOUCH ULTRA TEST VI) strip 1 each by In Vitro route daily As needed. 100 each 3    cyclobenzaprine (FLEXERIL) 5 MG tablet Take 5 mg by mouth 3 times daily as needed for Muscle spasms (Patient not taking: Reported on 5/27/2021)      celecoxib (CELEBREX) 200 MG capsule Take 1 capsule by mouth daily (Patient not taking: Reported on 5/27/2021) 30 capsule 0     No current facility-administered medications for this visit. Allergies   Allergen Reactions    Niaspan [Niacin] Hives       Subjective:     Review of Systems   Constitutional: Negative for activity change, appetite change, chills, fatigue and fever. HENT: Negative for congestion, ear discharge, ear pain, hearing loss, postnasal drip, sinus pressure, sneezing and sore throat. Eyes: Negative for visual disturbance. Respiratory: Negative for cough and shortness of breath. Cardiovascular: Negative for chest pain and palpitations. Gastrointestinal: Positive for abdominal pain and diarrhea (occasionally). Negative for constipation and nausea. Skin: Negative for rash. Neurological: Negative for headaches.        Objective:      Physical Exam  Vitals and nursing note reviewed. Constitutional:       General: He is not in acute distress. Appearance: He is well-developed. HENT:      Right Ear: Tympanic membrane, ear canal and external ear normal.      Left Ear: Tympanic membrane, ear canal and external ear normal.   Eyes:      Conjunctiva/sclera: Conjunctivae normal.   Cardiovascular:      Rate and Rhythm: Normal rate and regular rhythm. Heart sounds: Normal heart sounds. No murmur heard. Pulmonary:      Effort: Pulmonary effort is normal. No respiratory distress. Breath sounds: Normal breath sounds. No wheezing or rales. Abdominal:      General: Abdomen is flat. Bowel sounds are normal. There is no distension. Palpations: There is no mass. Tenderness: There is no abdominal tenderness. There is no guarding or rebound. Hernia: No hernia is present. Musculoskeletal:         General: Normal range of motion. Cervical back: Normal range of motion and neck supple. Lymphadenopathy:      Cervical: No cervical adenopathy. Skin:     General: Skin is warm and dry. Findings: No rash. Neurological:      Mental Status: He is alert and oriented to person, place, and time. /76 (Site: Right Upper Arm, Position: Sitting, Cuff Size: Medium Adult)   Pulse 79   Temp 97.5 °F (36.4 °C) (Temporal)   Resp 18   Ht 5' 5\" (1.651 m)   Wt 177 lb 12.8 oz (80.6 kg)   SpO2 99%   BMI 29.59 kg/m²     Assessment:       Diagnosis Orders   1. Gastroesophageal reflux disease without esophagitis               Plan:        GERD: worsening; based on his neg review of systems and recent normal stress test I suspect that he is having some issues with GERD so I added pepcid in addition to his omeprazole. Return if symptoms worsen or fail to improve.       Orders Placed This Encounter   Medications    famotidine (PEPCID) 20 MG tablet     Sig: Take 1 tablet by mouth 2 times daily     Dispense:  60 tablet     Refill:  1

## 2021-07-06 ENCOUNTER — HOSPITAL ENCOUNTER (OUTPATIENT)
Dept: LAB | Age: 61
Discharge: HOME | End: 2021-07-06

## 2021-07-06 DIAGNOSIS — E11.59 TYPE 2 DIABETES MELLITUS WITH OTHER CIRCULATORY COMPLICATION, WITHOUT LONG-TERM CURRENT USE OF INSULIN (HCC): ICD-10-CM

## 2021-07-06 LAB
ANION GAP SERPL CALCULATED.3IONS-SCNC: 8 MMOL/L (ref 9–17)
BUN BLDV-MCNC: 15 MG/DL (ref 8–23)
BUN/CREAT BLD: 14 (ref 9–20)
CALCIUM SERPL-MCNC: 9.3 MG/DL (ref 8.6–10.4)
CHLORIDE BLD-SCNC: 100 MMOL/L (ref 98–107)
CO2: 30 MMOL/L (ref 20–31)
CREAT SERPL-MCNC: 1.04 MG/DL (ref 0.7–1.2)
ESTIMATED AVERAGE GLUCOSE: 131 MG/DL
GFR AFRICAN AMERICAN: >60 ML/MIN
GFR NON-AFRICAN AMERICAN: >60 ML/MIN
GFR SERPL CREATININE-BSD FRML MDRD: ABNORMAL ML/MIN/{1.73_M2}
GFR SERPL CREATININE-BSD FRML MDRD: ABNORMAL ML/MIN/{1.73_M2}
GLUCOSE BLD-MCNC: 166 MG/DL (ref 70–99)
HBA1C MFR BLD: 6.2 % (ref 4–6)
POTASSIUM SERPL-SCNC: 4.2 MMOL/L (ref 3.7–5.3)
SODIUM BLD-SCNC: 138 MMOL/L (ref 135–144)

## 2021-07-06 PROCEDURE — 80048 BASIC METABOLIC PNL TOTAL CA: CPT

## 2021-07-06 PROCEDURE — 36415 COLL VENOUS BLD VENIPUNCTURE: CPT

## 2021-07-06 PROCEDURE — 83036 HEMOGLOBIN GLYCOSYLATED A1C: CPT

## 2021-07-12 DIAGNOSIS — I10 ESSENTIAL HYPERTENSION: ICD-10-CM

## 2021-07-12 DIAGNOSIS — R00.0 TACHYCARDIA: ICD-10-CM

## 2021-07-12 DIAGNOSIS — I10 ESSENTIAL HYPERTENSION, BENIGN: ICD-10-CM

## 2021-07-12 RX ORDER — METOPROLOL SUCCINATE 25 MG/1
TABLET, EXTENDED RELEASE ORAL
Qty: 30 TABLET | Refills: 5 | Status: SHIPPED | OUTPATIENT
Start: 2021-07-12 | End: 2021-10-04 | Stop reason: SDUPTHER

## 2021-07-12 RX ORDER — ENALAPRIL MALEATE 20 MG/1
TABLET ORAL
Qty: 90 TABLET | Refills: 1 | Status: SHIPPED | OUTPATIENT
Start: 2021-07-12 | End: 2021-10-04 | Stop reason: SDUPTHER

## 2021-07-12 NOTE — TELEPHONE ENCOUNTER
Irvin Loco called requesting a refill of the below medication which has been pended for you:     Requested Prescriptions     Pending Prescriptions Disp Refills    enalapril (VASOTEC) 20 MG tablet [Pharmacy Med Name: ENALAPRIL MALEATE 20MG TABS] 90 tablet 1     Sig: TAKE 1 TABLET BY MOUTH ONE TIME DAILY       Last Appointment Date: 5/27/2021  Next Appointment Date: 7/15/2021    Allergies   Allergen Reactions    Niaspan [Niacin] Hives

## 2021-07-12 NOTE — TELEPHONE ENCOUNTER
Shirlene Khan called requesting a refill of the below medication which has been pended for you:     Requested Prescriptions     Pending Prescriptions Disp Refills    metoprolol succinate (TOPROL XL) 25 MG extended release tablet 30 tablet 5     Sig: TAKE 1 TABLET BY MOUTH ONE TIME A DAY       Last Appointment Date: 5/27/2021  Next Appointment Date: 7/15/2021    Allergies   Allergen Reactions    Niaspan [Niacin] Hives

## 2021-07-15 ENCOUNTER — OFFICE VISIT (OUTPATIENT)
Dept: FAMILY MEDICINE CLINIC | Age: 61
End: 2021-07-15

## 2021-07-15 VITALS
BODY MASS INDEX: 28.99 KG/M2 | SYSTOLIC BLOOD PRESSURE: 138 MMHG | HEART RATE: 92 BPM | HEIGHT: 65 IN | WEIGHT: 174 LBS | TEMPERATURE: 98.4 F | OXYGEN SATURATION: 98 % | DIASTOLIC BLOOD PRESSURE: 78 MMHG

## 2021-07-15 DIAGNOSIS — N40.1 BENIGN PROSTATIC HYPERPLASIA WITH URINARY HESITANCY: ICD-10-CM

## 2021-07-15 DIAGNOSIS — E11.59 TYPE 2 DIABETES MELLITUS WITH OTHER CIRCULATORY COMPLICATION, WITHOUT LONG-TERM CURRENT USE OF INSULIN (HCC): Primary | ICD-10-CM

## 2021-07-15 DIAGNOSIS — I10 ESSENTIAL HYPERTENSION: ICD-10-CM

## 2021-07-15 DIAGNOSIS — K21.9 GASTROESOPHAGEAL REFLUX DISEASE WITHOUT ESOPHAGITIS: ICD-10-CM

## 2021-07-15 DIAGNOSIS — R39.11 BENIGN PROSTATIC HYPERPLASIA WITH URINARY HESITANCY: ICD-10-CM

## 2021-07-15 PROBLEM — E11.9 DIABETES MELLITUS (HCC): Status: ACTIVE | Noted: 2021-07-15

## 2021-07-15 PROCEDURE — 99214 OFFICE O/P EST MOD 30 MIN: CPT | Performed by: FAMILY MEDICINE

## 2021-07-15 PROCEDURE — 99203 OFFICE O/P NEW LOW 30 MIN: CPT

## 2021-07-15 RX ORDER — FINASTERIDE 5 MG/1
5 TABLET, FILM COATED ORAL DAILY
Qty: 90 TABLET | Refills: 1 | Status: SHIPPED | OUTPATIENT
Start: 2021-07-15 | End: 2022-01-25

## 2021-07-15 RX ORDER — TAMSULOSIN HYDROCHLORIDE 0.4 MG/1
CAPSULE ORAL
Qty: 90 CAPSULE | Refills: 1 | Status: CANCELLED | OUTPATIENT
Start: 2021-07-15

## 2021-07-15 RX ORDER — OMEPRAZOLE 40 MG/1
40 CAPSULE, DELAYED RELEASE ORAL
Qty: 90 CAPSULE | Refills: 1 | Status: CANCELLED | OUTPATIENT
Start: 2021-07-15

## 2021-07-15 SDOH — ECONOMIC STABILITY: FOOD INSECURITY: WITHIN THE PAST 12 MONTHS, THE FOOD YOU BOUGHT JUST DIDN'T LAST AND YOU DIDN'T HAVE MONEY TO GET MORE.: PATIENT DECLINED

## 2021-07-15 SDOH — ECONOMIC STABILITY: FOOD INSECURITY: WITHIN THE PAST 12 MONTHS, YOU WORRIED THAT YOUR FOOD WOULD RUN OUT BEFORE YOU GOT MONEY TO BUY MORE.: PATIENT DECLINED

## 2021-07-15 ASSESSMENT — ENCOUNTER SYMPTOMS
CONSTIPATION: 0
ABDOMINAL PAIN: 0
WHEEZING: 0
SHORTNESS OF BREATH: 0
CHEST TIGHTNESS: 0
COUGH: 0
DIARRHEA: 0

## 2021-07-15 ASSESSMENT — SOCIAL DETERMINANTS OF HEALTH (SDOH): HOW HARD IS IT FOR YOU TO PAY FOR THE VERY BASICS LIKE FOOD, HOUSING, MEDICAL CARE, AND HEATING?: PATIENT DECLINED

## 2021-07-15 NOTE — PROGRESS NOTES
MARLA Syed [address]  [address] [address]  Dept: [phone]  Dept Fax: [phone]  Loc: [phone]    Johnson Espana is a 57 y.o. male who presents today for his medical conditions/complaints as noted below. Johnson Espana is c/o of   Chief Complaint   Patient presents with    Establish Care     prev KW pt    Hypertension       HPI:     HPI Here today to establish care. He was previously seeing Juli Martínez. He would like to discuss his congestion, DM and elevated PSA. The last time I saw him was in  and he was having a lot of issues with chest congestion which seemed to turn out to be heartburn. He has been taking pecid 20mg bid initially and now he is down to 20mg daily. In the past he has had an elevated psa with multiple prostate biopsies that was negative for prostate cancer. The urologist he saw most recently wants to repeat the biopsies but he is not interested. He has not had any issues with urination. He has some nocturia. Mild issues with hesitancy. Some incomplete emptying. No blood in the urine. He is not seeing urology regularly. He is not sure the flomax is helping. DM: he lift weights daily and he walks daily. He does have a little bit of a sugar addiction and he has an ice cream bar nightly. He occasionally feels a little hypoglycemic but he has not passed out at all.        Past Medical History:   Diagnosis Date    Anxiety     Depression     Erectile dysfunction     Fatigue     Hyperlipidemia     Hypertension     Impaired fasting glucose     Memory problem     Sleep disorder     Tremor           Social History     Tobacco Use    Smoking status: Former Smoker     Packs/day: 2.00     Years: 20.00     Pack years: 40.00     Types: Cigarettes     Start date: 6/15/1974     Quit date: 6/15/1989     Years since quittin.1    Smokeless tobacco: Never Used    Tobacco comment: quit over 25 years ago   Substance Use Topics    Alcohol use: No     Comment: quit 12 years ago     Current Outpatient Medications   Medication Sig Dispense Refill    finasteride (PROSCAR) 5 MG tablet Take 1 tablet by mouth daily 90 tablet 1    enalapril (VASOTEC) 20 MG tablet TAKE 1 TABLET BY MOUTH ONE TIME DAILY 90 tablet 1    metoprolol succinate (TOPROL XL) 25 MG extended release tablet TAKE 1 TABLET BY MOUTH ONE TIME A DAY 30 tablet 5    famotidine (PEPCID) 20 MG tablet Take 1 tablet by mouth 2 times daily 60 tablet 1    metFORMIN (GLUCOPHAGE) 500 MG tablet TAKE 1 TABLET BY MOUTH 2 TIMES A DAY WITH MEALS 180 tablet 1    glucose blood VI test strips (ASCENSIA AUTODISC VI;ONE TOUCH ULTRA TEST VI) strip 1 each by In Vitro route daily As needed. 100 each 3    Cholecalciferol (VITAMIN D3 PO) Take 5,000 Units by mouth       No current facility-administered medications for this visit. Allergies   Allergen Reactions    Niaspan [Niacin] Hives       Subjective:     Review of Systems   Constitutional: Negative for activity change, appetite change, chills, fatigue and fever. Eyes: Negative for visual disturbance. Respiratory: Negative for cough, chest tightness, shortness of breath and wheezing. Cardiovascular: Negative for chest pain, palpitations and leg swelling. Gastrointestinal: Negative for abdominal pain, constipation and diarrhea. Genitourinary: Positive for difficulty urinating and frequency. Negative for dysuria, testicular pain and urgency. Skin: Negative for rash. Neurological: Negative for dizziness, syncope, weakness, light-headedness and headaches. Objective:      Physical Exam  Vitals and nursing note reviewed. Constitutional:       General: He is not in acute distress. Appearance: He is well-developed. Eyes:      Conjunctiva/sclera: Conjunctivae normal.   Cardiovascular:      Rate and Rhythm: Normal rate and regular rhythm. Heart sounds: Normal heart sounds.  No murmur heard.     Pulmonary:      Effort: Pulmonary effort is normal. No respiratory distress. Breath sounds: Normal breath sounds. No wheezing or rales. Musculoskeletal:         General: Normal range of motion. Cervical back: Normal range of motion and neck supple. Lymphadenopathy:      Cervical: No cervical adenopathy. Skin:     General: Skin is warm and dry. Findings: No rash. Neurological:      Mental Status: He is alert and oriented to person, place, and time. Monofilament Exam Reveals:  Pulses: normal  Edema:normal  Skin Lesions:mild calluses on bottom of feet    Right Foot:      Normal sensation at 1, 2, 3, 4, 5, 6, 7, 8, 9 and 10  Diminished sensation at 0  No sensation at 0       Left Foot:  Normal sensation at 1, 2, 3, 4, 5, 6, 7, 8, 9 and 10  Diminished sensation at 0  No sensation at 0            /78   Pulse 92   Temp 98.4 °F (36.9 °C)   Ht 5' 5\" (1.651 m)   Wt 174 lb (78.9 kg)   SpO2 98%   BMI 28.96 kg/m²     Assessment:       Diagnosis Orders   1. Type 2 diabetes mellitus with other circulatory complication, without long-term current use of insulin ([facility])  Basic Metabolic Panel    Hemoglobin A1C    Lipid Panel    Microalbumin, Ur   2. Gastroesophageal reflux disease without esophagitis     3. Essential hypertension     4. Benign prostatic hyperplasia with urinary hesitancy        No visits with results within 1 Day(s) from this visit.    Latest known visit with results is:   Hospital Outpatient Visit on 07/06/2021   Component Date Value Ref Range Status    Glucose 07/06/2021 166* 70 - 99 mg/dL Final    BUN 07/06/2021 15  8 - 23 mg/dL Final    CREATININE 07/06/2021 1.04  0.70 - 1.20 mg/dL Final    Bun/Cre Ratio 07/06/2021 14  9 - 20 Final    Calcium 07/06/2021 9.3  8.6 - 10.4 mg/dL Final    Sodium 07/06/2021 138  135 - 144 mmol/L Final    Potassium 07/06/2021 4.2  3.7 - 5.3 mmol/L Final    Chloride 07/06/2021 100  98 - 107 mmol/L Final    CO2 07/06/2021 30  20 - 31 mmol/L Final    Anion Gap 07/06/2021 8* 9 - 17 mmol/L Final    GFR Non- 07/06/2021 >60  >60 mL/min Final    GFR  07/06/2021 >60  >60 mL/min Final    GFR Comment 07/06/2021        Final    GFR Staging 07/06/2021 NOT REPORTED   Final    Hemoglobin A1C 07/06/2021 6.2* 4.0 - 6.0 % Final    Estimated Avg Glucose 07/06/2021 131  mg/dL Final            Plan:        DM: stable; his a1c went from 5.3 to 6.2 but his sulfonylurea was stopped and he is doing great so I have no concerns right now. I reassured him that his blood sugars are great. His foot exam was normal.     GERD: improving; he seems to be doing better and he likes the pepcid daily so I will have him continue that for now. HTN: stable; his blood pressure is well controlled so I will continue his current medications. BPH: stable; he does not want to follow with urology and he does not want another prostate biopsy so I do no think it is worth repeating a PSA. I did suggest trying proscar to see if that helps his urinary symptoms more than flomax though. Return in about 6 months (around 1/15/2022) for DM follow up. Orders Placed This Encounter   Procedures    Basic Metabolic Panel     Standing Status:   Future     Standing Expiration Date:   7/15/2022    Hemoglobin A1C     Standing Status:   Future     Standing Expiration Date:   7/15/2022    Lipid Panel     Standing Status:   Future     Standing Expiration Date:   7/15/2022     Order Specific Question:   Is Patient Fasting?/# of Hours     Answer:   0 per dr Vale Howard, Shay     Standing Status:   Future     Standing Expiration Date:   7/15/2022     Scheduling Instructions: To be done in 6 months     Orders Placed This Encounter   Medications    finasteride (PROSCAR) 5 MG tablet     Sig: Take 1 tablet by mouth daily     Dispense:  90 tablet     Refill:  1       Patientgiven educational materials - see patient instructions.   Discussed use, benefit,and side effects of prescribed medications. All patient questions answered. Ptvoiced understanding. Reviewed health maintenance. Instructed to continue currentmedications, diet and exercise. Patient agreed with treatment plan. Follow up asdirected.      Electronically signed by Suzanne Limon MD on 7/15/2021 at 5:14 PM

## 2021-10-04 DIAGNOSIS — R00.0 TACHYCARDIA: ICD-10-CM

## 2021-10-04 DIAGNOSIS — I10 ESSENTIAL HYPERTENSION: ICD-10-CM

## 2021-10-04 DIAGNOSIS — I10 ESSENTIAL HYPERTENSION, BENIGN: ICD-10-CM

## 2021-10-04 RX ORDER — METOPROLOL SUCCINATE 25 MG/1
TABLET, EXTENDED RELEASE ORAL
Qty: 90 TABLET | Refills: 1 | Status: SHIPPED | OUTPATIENT
Start: 2021-10-04 | End: 2022-04-04

## 2021-10-04 RX ORDER — ENALAPRIL MALEATE 20 MG/1
20 TABLET ORAL DAILY
Qty: 90 TABLET | Refills: 1 | Status: SHIPPED | OUTPATIENT
Start: 2021-10-04 | End: 2022-04-04

## 2021-10-04 NOTE — TELEPHONE ENCOUNTER
Boy Ibarra called requesting a refill of the below medication which has been pended for you:     Requested Prescriptions     Pending Prescriptions Disp Refills    enalapril (VASOTEC) 20 MG tablet 90 tablet 1    metoprolol succinate (TOPROL XL) 25 MG extended release tablet 90 tablet 1     Sig: TAKE 1 TABLET BY MOUTH ONE TIME A DAY       Last Appointment Date: 7/15/2021  Next Appointment Date: 1/18/2022    Allergies   Allergen Reactions    Niaspan [Niacin] Hives

## 2021-10-05 ENCOUNTER — IMMUNIZATION (OUTPATIENT)
Dept: LAB | Age: 61
End: 2021-10-05

## 2021-10-05 PROCEDURE — 90686 IIV4 VACC NO PRSV 0.5 ML IM: CPT | Performed by: FAMILY MEDICINE

## 2021-10-05 PROCEDURE — PBSHW INFLUENZA, QUADV, 6 MO AND OLDER, IM, PF, PREFILL SYR, 0.5ML (FLUARIX QUADV, PF): Performed by: FAMILY MEDICINE

## 2021-12-08 ENCOUNTER — IMMUNIZATION (OUTPATIENT)
Dept: LAB | Age: 61
End: 2021-12-08

## 2021-12-08 PROCEDURE — 91306 COVID-19, MODERNA BOOSTER VACCINE 0.25ML DOSE: CPT | Performed by: INTERNAL MEDICINE

## 2021-12-08 PROCEDURE — PBSHW COVID-19, MODERNA BOOSTER VACCINE 0.25ML DOSE: Performed by: INTERNAL MEDICINE

## 2021-12-20 ENCOUNTER — HOSPITAL ENCOUNTER (OUTPATIENT)
Dept: LAB | Age: 61
Discharge: HOME | End: 2021-12-20

## 2021-12-20 DIAGNOSIS — E11.59 TYPE 2 DIABETES MELLITUS WITH OTHER CIRCULATORY COMPLICATION, WITHOUT LONG-TERM CURRENT USE OF INSULIN (HCC): ICD-10-CM

## 2021-12-20 LAB
ANION GAP SERPL CALCULATED.3IONS-SCNC: 12 MMOL/L (ref 9–17)
BUN BLDV-MCNC: 13 MG/DL (ref 8–23)
BUN/CREAT BLD: 15 (ref 9–20)
CALCIUM SERPL-MCNC: 9.6 MG/DL (ref 8.6–10.4)
CHLORIDE BLD-SCNC: 102 MMOL/L (ref 98–107)
CHOLESTEROL/HDL RATIO: 4.4
CHOLESTEROL: 193 MG/DL
CO2: 27 MMOL/L (ref 20–31)
CREAT SERPL-MCNC: 0.86 MG/DL (ref 0.7–1.2)
CREATININE URINE: 78.7 MG/DL (ref 39–259)
ESTIMATED AVERAGE GLUCOSE: 123 MG/DL
GFR AFRICAN AMERICAN: >60 ML/MIN
GFR NON-AFRICAN AMERICAN: >60 ML/MIN
GFR SERPL CREATININE-BSD FRML MDRD: ABNORMAL ML/MIN/{1.73_M2}
GFR SERPL CREATININE-BSD FRML MDRD: ABNORMAL ML/MIN/{1.73_M2}
GLUCOSE BLD-MCNC: 162 MG/DL (ref 70–99)
HBA1C MFR BLD: 5.9 % (ref 4–6)
HDLC SERPL-MCNC: 44 MG/DL
LDL CHOLESTEROL: 128 MG/DL (ref 0–130)
MICROALBUMIN/CREAT 24H UR: 32 MG/L
MICROALBUMIN/CREAT UR-RTO: 41 MCG/MG CREAT
POTASSIUM SERPL-SCNC: 3.9 MMOL/L (ref 3.7–5.3)
SODIUM BLD-SCNC: 141 MMOL/L (ref 135–144)
TRIGL SERPL-MCNC: 106 MG/DL
VLDLC SERPL CALC-MCNC: NORMAL MG/DL (ref 1–30)

## 2021-12-20 PROCEDURE — 82043 UR ALBUMIN QUANTITATIVE: CPT

## 2021-12-20 PROCEDURE — 82570 ASSAY OF URINE CREATININE: CPT

## 2021-12-20 PROCEDURE — 80048 BASIC METABOLIC PNL TOTAL CA: CPT

## 2021-12-20 PROCEDURE — 80061 LIPID PANEL: CPT

## 2021-12-20 PROCEDURE — 83036 HEMOGLOBIN GLYCOSYLATED A1C: CPT

## 2021-12-20 PROCEDURE — 36415 COLL VENOUS BLD VENIPUNCTURE: CPT

## 2022-01-18 ENCOUNTER — OFFICE VISIT (OUTPATIENT)
Dept: FAMILY MEDICINE CLINIC | Age: 62
End: 2022-01-18

## 2022-01-18 VITALS
HEART RATE: 100 BPM | TEMPERATURE: 98.3 F | HEIGHT: 65 IN | DIASTOLIC BLOOD PRESSURE: 80 MMHG | WEIGHT: 170 LBS | BODY MASS INDEX: 28.32 KG/M2 | OXYGEN SATURATION: 99 % | SYSTOLIC BLOOD PRESSURE: 138 MMHG

## 2022-01-18 DIAGNOSIS — F32.9 REACTIVE DEPRESSION: ICD-10-CM

## 2022-01-18 DIAGNOSIS — N40.1 BENIGN PROSTATIC HYPERPLASIA WITH URINARY HESITANCY: ICD-10-CM

## 2022-01-18 DIAGNOSIS — R39.11 BENIGN PROSTATIC HYPERPLASIA WITH URINARY HESITANCY: ICD-10-CM

## 2022-01-18 DIAGNOSIS — E11.9 TYPE 2 DIABETES MELLITUS WITHOUT COMPLICATION, WITHOUT LONG-TERM CURRENT USE OF INSULIN (HCC): Primary | ICD-10-CM

## 2022-01-18 DIAGNOSIS — E78.5 HYPERLIPIDEMIA, UNSPECIFIED HYPERLIPIDEMIA TYPE: ICD-10-CM

## 2022-01-18 DIAGNOSIS — M54.6 ACUTE BILATERAL THORACIC BACK PAIN: ICD-10-CM

## 2022-01-18 PROCEDURE — 99212 OFFICE O/P EST SF 10 MIN: CPT

## 2022-01-18 PROCEDURE — 99214 OFFICE O/P EST MOD 30 MIN: CPT | Performed by: FAMILY MEDICINE

## 2022-01-18 RX ORDER — LORATADINE 10 MG/1
10 TABLET ORAL DAILY
COMMUNITY

## 2022-01-18 RX ORDER — PREDNISONE 20 MG/1
TABLET ORAL
Qty: 15 TABLET | Refills: 0 | Status: SHIPPED | OUTPATIENT
Start: 2022-01-18 | End: 2022-08-16

## 2022-01-18 RX ORDER — CYCLOBENZAPRINE HCL 5 MG
5 TABLET ORAL NIGHTLY PRN
Qty: 30 TABLET | Refills: 0 | Status: SHIPPED | OUTPATIENT
Start: 2022-01-18 | End: 2022-01-28

## 2022-01-18 ASSESSMENT — PATIENT HEALTH QUESTIONNAIRE - PHQ9
SUM OF ALL RESPONSES TO PHQ QUESTIONS 1-9: 0
5. POOR APPETITE OR OVEREATING: 0
SUM OF ALL RESPONSES TO PHQ QUESTIONS 1-9: 0
1. LITTLE INTEREST OR PLEASURE IN DOING THINGS: 0
10. IF YOU CHECKED OFF ANY PROBLEMS, HOW DIFFICULT HAVE THESE PROBLEMS MADE IT FOR YOU TO DO YOUR WORK, TAKE CARE OF THINGS AT HOME, OR GET ALONG WITH OTHER PEOPLE: 0
7. TROUBLE CONCENTRATING ON THINGS, SUCH AS READING THE NEWSPAPER OR WATCHING TELEVISION: 0
8. MOVING OR SPEAKING SO SLOWLY THAT OTHER PEOPLE COULD HAVE NOTICED. OR THE OPPOSITE, BEING SO FIGETY OR RESTLESS THAT YOU HAVE BEEN MOVING AROUND A LOT MORE THAN USUAL: 0
3. TROUBLE FALLING OR STAYING ASLEEP: 0
SUM OF ALL RESPONSES TO PHQ9 QUESTIONS 1 & 2: 0
SUM OF ALL RESPONSES TO PHQ QUESTIONS 1-9: 0
6. FEELING BAD ABOUT YOURSELF - OR THAT YOU ARE A FAILURE OR HAVE LET YOURSELF OR YOUR FAMILY DOWN: 0
SUM OF ALL RESPONSES TO PHQ QUESTIONS 1-9: 0
4. FEELING TIRED OR HAVING LITTLE ENERGY: 0
2. FEELING DOWN, DEPRESSED OR HOPELESS: 0
9. THOUGHTS THAT YOU WOULD BE BETTER OFF DEAD, OR OF HURTING YOURSELF: 0

## 2022-01-18 ASSESSMENT — ENCOUNTER SYMPTOMS
COUGH: 0
CHEST TIGHTNESS: 0
BACK PAIN: 1
BOWEL INCONTINENCE: 0
SHORTNESS OF BREATH: 0
WHEEZING: 0

## 2022-01-18 NOTE — PROGRESS NOTES
MARLA Syed [address]  [address] [address]  Dept: [phone]  Dept Fax: [phone]  Loc: [phone]    Korin Downs is a 57 y.o. male who presents today for his medical conditions/complaints as noted below. Korin Downs is c/o of   Chief Complaint   Patient presents with    6 Month Follow-Up     DM 2    Back Pain     x 3 weeks       HPI:     Here today for a follow up of his DM and he has been having some back pain. Back Pain  This is a recurrent problem. The current episode started 1 to 4 weeks ago (3 weeks). The problem occurs constantly. The problem is unchanged. The pain is present in the thoracic spine. The quality of the pain is described as aching. The pain does not radiate. The pain is at a severity of 5/10. The pain is moderate. The symptoms are aggravated by lying down, twisting and standing. Pertinent negatives include no bladder incontinence, bowel incontinence, chest pain, dysuria, fever, headaches, numbness, paresis, pelvic pain or weakness. He has tried home exercises (he is walking daily and lifting weights 5 times a week) for the symptoms. The treatment provided mild relief. DM: improving; his blood sugars have been good recently. His a1c was 5.9 in Dec 2021. He has been really limiting his sugar intake. He has 20 M&Ms on Sat and otherwise he doesn't have any any candy. He is also not eating any ice cream either and he also has decided since the new year not to have any more pop. He has been trying to eat small meals early in the day. He has been off of his pepcid for a few months and he has been doing well since he has been been cutting back on his sugar intake. He has been trying to limit his spicy foods and limit the foods that he knows irritate his stomach.      Past Medical History:   Diagnosis Date    Anxiety     Depression     Erectile dysfunction     Fatigue     Hyperlipidemia     Hypertension     Impaired fasting glucose     Memory problem     Sleep disorder     Tremor           Social History     Tobacco Use    Smoking status: Former Smoker     Packs/day: 2.00     Years: 20.00     Pack years: 40.00     Types: Cigarettes     Start date: 6/15/1974     Quit date: 6/15/1989     Years since quittin.6    Smokeless tobacco: Never Used    Tobacco comment: quit over 25 years ago   Substance Use Topics    Alcohol use: No     Comment: quit 12 years ago     Current Outpatient Medications   Medication Sig Dispense Refill    loratadine (CLARITIN) 10 MG tablet Take 10 mg by mouth daily      metFORMIN (GLUCOPHAGE) 500 MG tablet Take 1 tablet by mouth daily (with breakfast) 90 tablet 1    predniSONE (DELTASONE) 20 MG tablet 1 bid for 5 days, 1 qd for 5 days 15 tablet 0    cyclobenzaprine (FLEXERIL) 5 MG tablet Take 1 tablet by mouth nightly as needed for Muscle spasms 30 tablet 0    enalapril (VASOTEC) 20 MG tablet Take 1 tablet by mouth daily 90 tablet 1    metoprolol succinate (TOPROL XL) 25 MG extended release tablet TAKE 1 TABLET BY MOUTH ONE TIME A DAY 90 tablet 1    finasteride (PROSCAR) 5 MG tablet Take 1 tablet by mouth daily 90 tablet 1    Cholecalciferol (VITAMIN D3 PO) Take 5,000 Units by mouth      glucose blood VI test strips (ASCENSIA AUTODISC VI;ONE TOUCH ULTRA TEST VI) strip 1 each by In Vitro route daily As needed. 100 each 3     No current facility-administered medications for this visit. Allergies   Allergen Reactions    Niaspan [Niacin] Hives       Subjective:     Review of Systems   Constitutional: Negative for activity change, appetite change, chills, fatigue and fever. Eyes: Negative for visual disturbance. Respiratory: Negative for cough, chest tightness, shortness of breath and wheezing. Cardiovascular: Negative for chest pain, palpitations and leg swelling. Gastrointestinal: Negative for bowel incontinence. Genitourinary: Negative for bladder incontinence, difficulty urinating, dysuria and pelvic pain. Musculoskeletal: Positive for back pain. Skin: Negative for rash. Neurological: Negative for dizziness, syncope, weakness, light-headedness, numbness and headaches. Objective:      Physical Exam  Vitals and nursing note reviewed. Constitutional:       General: He is not in acute distress. Appearance: He is well-developed. Eyes:      Conjunctiva/sclera: Conjunctivae normal.   Cardiovascular:      Rate and Rhythm: Normal rate and regular rhythm. Heart sounds: Normal heart sounds. No murmur heard. Pulmonary:      Effort: Pulmonary effort is normal. No respiratory distress. Breath sounds: Normal breath sounds. No wheezing or rales. Musculoskeletal:         General: Normal range of motion. Cervical back: Normal range of motion and neck supple. Thoracic back: Spasms present. No edema, signs of trauma or tenderness. Normal range of motion. Lumbar back: Spasms present. No edema, signs of trauma, tenderness or bony tenderness. Normal range of motion. Negative right straight leg raise test and negative left straight leg raise test.   Lymphadenopathy:      Cervical: No cervical adenopathy. Skin:     General: Skin is warm and dry. Findings: No rash. Neurological:      Mental Status: He is alert and oriented to person, place, and time. /80   Pulse 100   Temp 98.3 °F (36.8 °C)   Ht 5' 5\" (1.651 m)   Wt 170 lb (77.1 kg)   SpO2 99%   BMI 28.29 kg/m²     Assessment:       Diagnosis Orders   1. Type 2 diabetes mellitus without complication, without long-term current use of insulin ([facility])  metFORMIN (GLUCOPHAGE) 500 MG tablet    Basic Metabolic Panel    Hemoglobin A1C   2. Acute bilateral thoracic back pain     3. Reactive depression     4. Benign prostatic hyperplasia with urinary hesitancy  PSA screening   5.  Hyperlipidemia, unspecified hyperlipidemia type on 1/18/2022 at 2:37 PM

## 2022-01-24 NOTE — TELEPHONE ENCOUNTER
Dayami Saenz called requesting a refill of the below medication which has been pended for you:     Requested Prescriptions     Pending Prescriptions Disp Refills    finasteride (PROSCAR) 5 MG tablet [Pharmacy Med Name: FINASTERIDE 5MG TABS] 90 tablet 1     Sig: TAKE 1 TABLET BY MOUTH ONE TIME A DAY       Last Appointment Date: 1/18/2022  Next Appointment Date: 7/18/2022    Allergies   Allergen Reactions    Niaspan [Niacin] Hives

## 2022-01-25 RX ORDER — FINASTERIDE 5 MG/1
TABLET, FILM COATED ORAL
Qty: 90 TABLET | Refills: 1 | Status: SHIPPED | OUTPATIENT
Start: 2022-01-25 | End: 2022-07-05

## 2022-03-21 ENCOUNTER — TELEPHONE (OUTPATIENT)
Dept: FAMILY MEDICINE CLINIC | Age: 62
End: 2022-03-21

## 2022-03-21 DIAGNOSIS — E11.9 TYPE 2 DIABETES MELLITUS WITHOUT COMPLICATION, WITHOUT LONG-TERM CURRENT USE OF INSULIN (HCC): ICD-10-CM

## 2022-03-21 NOTE — TELEPHONE ENCOUNTER
Florie Fothergill called requesting a refill of the below medication which has been pended for you: left message for patient to call back to clarify what pharmacy he wants his Metformin to go to. Script sent and good till  July to [facility].      Requested Prescriptions     Pending Prescriptions Disp Refills    metFORMIN (GLUCOPHAGE) 500 MG tablet [Pharmacy Med Name: METFORMIN HCL 500MG TABS] 180 tablet 1     Sig: TAKE 1 TABLET BY MOUTH TWO TIMES A DAY WITH MEALS       Last Appointment Date: 1/18/2022  Next Appointment Date: 7/18/2022    Allergies   Allergen Reactions    Niaspan [Niacin] Hives

## 2022-03-21 NOTE — TELEPHONE ENCOUNTER
----- Message from Jaime Leonolf sent at 3/21/2022 12:01 PM EDT -----  Subject: Message to Provider    QUESTIONS  Information for Provider? Patient is returning a call from Jagjit West . Pharmacy use is [address], [place] [address] [phone] he doesnt go to [address]   anymore hasnt for years needs medication sent there.  ---------------------------------------------------------------------------  --------------  [address]  What is the best way for the office to contact you? OK to leave message on   voicemail  Preferred Call Back Phone Number?  [phone]  ---------------------------------------------------------------------------  --------------  SCRIPT ANSWERS  undefined

## 2022-04-04 DIAGNOSIS — I10 ESSENTIAL HYPERTENSION, BENIGN: ICD-10-CM

## 2022-04-04 DIAGNOSIS — R00.0 TACHYCARDIA: ICD-10-CM

## 2022-04-04 DIAGNOSIS — I10 ESSENTIAL HYPERTENSION: ICD-10-CM

## 2022-04-04 RX ORDER — METOPROLOL SUCCINATE 25 MG/1
TABLET, EXTENDED RELEASE ORAL
Qty: 90 TABLET | Refills: 1 | Status: SHIPPED | OUTPATIENT
Start: 2022-04-04 | End: 2022-10-03

## 2022-04-04 RX ORDER — ENALAPRIL MALEATE 20 MG/1
TABLET ORAL
Qty: 90 TABLET | Refills: 1 | Status: SHIPPED | OUTPATIENT
Start: 2022-04-04 | End: 2022-10-03

## 2022-07-05 RX ORDER — FINASTERIDE 5 MG/1
TABLET, FILM COATED ORAL
Qty: 90 TABLET | Refills: 1 | Status: SHIPPED | OUTPATIENT
Start: 2022-07-05

## 2022-07-05 NOTE — TELEPHONE ENCOUNTER
Helena Oneal called requesting a refill of the below medication which has been pended for you:     Requested Prescriptions     Pending Prescriptions Disp Refills    finasteride (PROSCAR) 5 MG tablet [Pharmacy Med Name: FINASTERIDE 5MG TABS] 90 tablet 1     Sig: TAKE 1 TABLET BY MOUTH ONE TIME DAILY       Last Appointment Date: 1/18/2022  Next Appointment Date: 8/16/2022    Allergies   Allergen Reactions    Niaspan [Niacin] Hives

## 2022-07-28 ENCOUNTER — HOSPITAL ENCOUNTER (OUTPATIENT)
Dept: LAB | Age: 62
Discharge: HOME | End: 2022-07-28

## 2022-07-28 DIAGNOSIS — R39.11 BENIGN PROSTATIC HYPERPLASIA WITH URINARY HESITANCY: ICD-10-CM

## 2022-07-28 DIAGNOSIS — N40.1 BENIGN PROSTATIC HYPERPLASIA WITH URINARY HESITANCY: ICD-10-CM

## 2022-07-28 DIAGNOSIS — E11.9 TYPE 2 DIABETES MELLITUS WITHOUT COMPLICATION, WITHOUT LONG-TERM CURRENT USE OF INSULIN (HCC): ICD-10-CM

## 2022-07-28 LAB
ANION GAP SERPL CALCULATED.3IONS-SCNC: 11 MMOL/L (ref 9–17)
BUN BLDV-MCNC: 13 MG/DL (ref 8–23)
BUN/CREAT BLD: 15 (ref 9–20)
CALCIUM SERPL-MCNC: 10 MG/DL (ref 8.6–10.4)
CHLORIDE BLD-SCNC: 104 MMOL/L (ref 98–107)
CO2: 27 MMOL/L (ref 20–31)
CREAT SERPL-MCNC: 0.86 MG/DL (ref 0.7–1.2)
ESTIMATED AVERAGE GLUCOSE: 114 MG/DL
GFR AFRICAN AMERICAN: >60 ML/MIN
GFR NON-AFRICAN AMERICAN: >60 ML/MIN
GFR SERPL CREATININE-BSD FRML MDRD: ABNORMAL ML/MIN/{1.73_M2}
GLUCOSE BLD-MCNC: 167 MG/DL (ref 70–99)
HBA1C MFR BLD: 5.6 % (ref 4–6)
POTASSIUM SERPL-SCNC: 4.7 MMOL/L (ref 3.7–5.3)
PROSTATE SPECIFIC ANTIGEN: 3.4 NG/ML
SODIUM BLD-SCNC: 142 MMOL/L (ref 135–144)

## 2022-07-28 PROCEDURE — G0103 PSA SCREENING: HCPCS

## 2022-07-28 PROCEDURE — 36415 COLL VENOUS BLD VENIPUNCTURE: CPT

## 2022-07-28 PROCEDURE — 83036 HEMOGLOBIN GLYCOSYLATED A1C: CPT

## 2022-07-28 PROCEDURE — 80048 BASIC METABOLIC PNL TOTAL CA: CPT

## 2022-08-16 ENCOUNTER — OFFICE VISIT (OUTPATIENT)
Dept: FAMILY MEDICINE CLINIC | Age: 62
End: 2022-08-16

## 2022-08-16 VITALS
TEMPERATURE: 98.5 F | HEIGHT: 65 IN | SYSTOLIC BLOOD PRESSURE: 118 MMHG | BODY MASS INDEX: 27.99 KG/M2 | OXYGEN SATURATION: 97 % | WEIGHT: 168 LBS | HEART RATE: 85 BPM | DIASTOLIC BLOOD PRESSURE: 70 MMHG

## 2022-08-16 DIAGNOSIS — I10 PRIMARY HYPERTENSION: ICD-10-CM

## 2022-08-16 DIAGNOSIS — E11.9 TYPE 2 DIABETES MELLITUS WITHOUT COMPLICATION, WITHOUT LONG-TERM CURRENT USE OF INSULIN (HCC): Primary | ICD-10-CM

## 2022-08-16 DIAGNOSIS — N40.1 BENIGN PROSTATIC HYPERPLASIA WITH URINARY HESITANCY: ICD-10-CM

## 2022-08-16 DIAGNOSIS — R39.11 BENIGN PROSTATIC HYPERPLASIA WITH URINARY HESITANCY: ICD-10-CM

## 2022-08-16 DIAGNOSIS — F32.9 REACTIVE DEPRESSION: ICD-10-CM

## 2022-08-16 PROCEDURE — 3044F HG A1C LEVEL LT 7.0%: CPT | Performed by: FAMILY MEDICINE

## 2022-08-16 PROCEDURE — 99214 OFFICE O/P EST MOD 30 MIN: CPT | Performed by: FAMILY MEDICINE

## 2022-08-16 PROCEDURE — 99212 OFFICE O/P EST SF 10 MIN: CPT

## 2022-08-16 SDOH — ECONOMIC STABILITY: FOOD INSECURITY: WITHIN THE PAST 12 MONTHS, YOU WORRIED THAT YOUR FOOD WOULD RUN OUT BEFORE YOU GOT MONEY TO BUY MORE.: PATIENT DECLINED

## 2022-08-16 SDOH — ECONOMIC STABILITY: FOOD INSECURITY: WITHIN THE PAST 12 MONTHS, THE FOOD YOU BOUGHT JUST DIDN'T LAST AND YOU DIDN'T HAVE MONEY TO GET MORE.: PATIENT DECLINED

## 2022-08-16 ASSESSMENT — ENCOUNTER SYMPTOMS
SHORTNESS OF BREATH: 0
COUGH: 0
CHEST TIGHTNESS: 0
WHEEZING: 0

## 2022-08-16 ASSESSMENT — SOCIAL DETERMINANTS OF HEALTH (SDOH): HOW HARD IS IT FOR YOU TO PAY FOR THE VERY BASICS LIKE FOOD, HOUSING, MEDICAL CARE, AND HEATING?: PATIENT DECLINED

## 2022-08-16 NOTE — PROGRESS NOTES
90 tablet 1    enalapril (VASOTEC) 20 MG tablet TAKE 1 TABLET BY MOUTH ONE TIME DAILY 90 tablet 1    metoprolol succinate (TOPROL XL) 25 MG extended release tablet TAKE 1 TABLET BY MOUTH ONE TIME DAILY 90 tablet 1    loratadine (CLARITIN) 10 MG tablet Take 10 mg by mouth daily      Cholecalciferol (VITAMIN D3 PO) Take 2,000 Units by mouth every other day      glucose blood VI test strips (ASCENSIA AUTODISC VI;ONE TOUCH ULTRA TEST VI) strip 1 each by In Vitro route daily As needed. 100 each 3     No current facility-administered medications for this visit. Allergies   Allergen Reactions    Niaspan [Niacin] Hives       Subjective:     Review of Systems   Constitutional:  Negative for activity change, appetite change, chills and fever. Eyes:  Negative for visual disturbance. Respiratory:  Negative for cough, chest tightness, shortness of breath and wheezing. Cardiovascular:  Negative for palpitations and leg swelling. Genitourinary:  Negative for difficulty urinating. Skin:  Negative for rash. Neurological:  Negative for syncope and light-headedness. Psychiatric/Behavioral:  Positive for sleep disturbance (due to nocturia). Objective:      Physical Exam  Vitals and nursing note reviewed. Constitutional:       General: He is not in acute distress. Appearance: He is well-developed. Eyes:      Conjunctiva/sclera: Conjunctivae normal.   Cardiovascular:      Rate and Rhythm: Normal rate and regular rhythm. Heart sounds: Normal heart sounds. No murmur heard. Pulmonary:      Effort: Pulmonary effort is normal. No respiratory distress. Breath sounds: Normal breath sounds. No wheezing or rales. Musculoskeletal:         General: Normal range of motion. Cervical back: Normal range of motion and neck supple. Lymphadenopathy:      Cervical: No cervical adenopathy. Skin:     General: Skin is warm and dry. Findings: No rash.    Neurological:      Mental Status: He is alert and oriented to person, place, and time. Psychiatric:         Mood and Affect: Mood normal.         Behavior: Behavior normal.     Monofilament Exam Reveals:  Pulses: normal  Edema:normal  Skin Lesions:calluses bilaterally    Right Foot:      Normal sensation at 1, 2, 3, 4, 5, 6, 7, 8, 9, and 10  Diminished sensation at 0  No sensation at 0       Left Foot:  Normal sensation at 1, 2, 3, 4, 5, 6, 7, 8, 9, and 10  Diminished sensation at 0  No sensation at 0          /70   Pulse 85   Temp 98.5 °F (36.9 °C)   Ht 5' 5\" (1.651 m)   Wt 168 lb (76.2 kg)   SpO2 97%   BMI 27.96 kg/m²     Assessment:       Diagnosis Orders   1. Type 2 diabetes mellitus without complication, without long-term current use of insulin ([facility])   DIABETES FOOT EXAM    Basic Metabolic Panel    Hemoglobin A1C    Microalbumin, Ur    Lipid Panel      2. Primary hypertension        3. Benign prostatic hyperplasia with urinary hesitancy        4. Reactive depression           No visits with results within 1 Day(s) from this visit.    Latest known visit with results is:   Hospital Outpatient Visit on 07/28/2022   Component Date Value Ref Range Status    Hemoglobin A1C 07/28/2022 5.6  4.0 - 6.0 % Final    Estimated Avg Glucose 07/28/2022 114  mg/dL Final    Glucose 07/28/2022 167 (A) 70 - 99 mg/dL Final    BUN 07/28/2022 13  8 - 23 mg/dL Final    Creatinine 07/28/2022 0.86  0.70 - 1.20 mg/dL Final    Bun/Cre Ratio 07/28/2022 15  9 - 20 Final    Calcium 07/28/2022 10.0  8.6 - 10.4 mg/dL Final    Sodium 07/28/2022 142  135 - 144 mmol/L Final    Potassium 07/28/2022 4.7  3.7 - 5.3 mmol/L Final    Chloride 07/28/2022 104  98 - 107 mmol/L Final    CO2 07/28/2022 27  20 - 31 mmol/L Final    Anion Gap 07/28/2022 11  9 - 17 mmol/L Final    GFR Non- 07/28/2022 >60  >60 mL/min Final    GFR  07/28/2022 >60  >60 mL/min Final    GFR Comment 07/28/2022        Final    PSA 07/28/2022 3.40  <4.1 ng/mL Final Plan:       DM: improving; his a1c decreased further down to 5.6 so I stopped his metformin. I will recheck labs in 6 months and then I will see him yearly after that. His foot exam was done today. HTN: stable; his blood pressure is well controlled. It is a little low today so if it continues to be low next time I will stop his metoprolol. BPH: stable; he is doing well on the proscar and his psa improved so he does not want to follow with urology. Depression: stable; he feels good off of Abilify still so I will continue to monitor. Return in about 6 months (around 2/16/2023) for DM follow up, HTN follow up. Orders Placed This Encounter   Procedures    Basic Metabolic Panel     Standing Status:   Future     Standing Expiration Date:   8/16/2023    Hemoglobin A1C     Standing Status:   Future     Standing Expiration Date:   8/16/2023    Microalbumin, Ur     Standing Status:   Future     Standing Expiration Date:   8/16/2023     Scheduling Instructions: To be done in 6 months    Lipid Panel     Standing Status:   Future     Standing Expiration Date:   8/16/2023     Order Specific Question:   Is Patient Fasting?/# of Hours     Answer:   0 per dr Arelis Heath     Order Specific Question:   Has the patient fasted? Answer:   No     DIABETES FOOT EXAM             Patientgiven educational materials - see patient instructions. Discussed use, benefit,and side effects of prescribed medications. All patient questions answered. Ptvoiced understanding. Reviewed health maintenance. Instructed to continue currentmedications, diet and exercise. Patient agreed with treatment plan. Follow up asdirected.      Electronically signed by Ramiro Pennington MD on 8/16/2022 at 2:32 PM

## 2022-10-03 DIAGNOSIS — I10 ESSENTIAL HYPERTENSION: ICD-10-CM

## 2022-10-03 DIAGNOSIS — R00.0 TACHYCARDIA: ICD-10-CM

## 2022-10-03 DIAGNOSIS — I10 ESSENTIAL HYPERTENSION, BENIGN: ICD-10-CM

## 2022-10-03 RX ORDER — ENALAPRIL MALEATE 20 MG/1
TABLET ORAL
Qty: 90 TABLET | Refills: 1 | Status: SHIPPED | OUTPATIENT
Start: 2022-10-03

## 2022-10-03 RX ORDER — METOPROLOL SUCCINATE 25 MG/1
TABLET, EXTENDED RELEASE ORAL
Qty: 90 TABLET | Refills: 1 | Status: SHIPPED | OUTPATIENT
Start: 2022-10-03

## 2022-10-03 NOTE — TELEPHONE ENCOUNTER
Barb Pham called requesting a refill of the below medication which has been pended for you:     Requested Prescriptions     Pending Prescriptions Disp Refills    enalapril (VASOTEC) 20 MG tablet [Pharmacy Med Name: ENALAPRIL MALEATE 20MG TABS] 90 tablet 1     Sig: TAKE 1 TABLET BY MOUTH ONE TIME A DAY    metoprolol succinate (TOPROL XL) 25 MG extended release tablet [Pharmacy Med Name: METOPROLOL SUCCINATE ER 25MG TB24] 90 tablet 1     Sig: TAKE 1 TABLET BY MOUTH ONE TIME A DAY       Last Appointment Date: 8/16/2022  Next Appointment Date: 2/16/2023    Allergies   Allergen Reactions    Niaspan [Niacin] Hives

## 2022-10-04 ENCOUNTER — IMMUNIZATION (OUTPATIENT)
Dept: LAB | Age: 62
End: 2022-10-04

## 2022-10-04 PROCEDURE — PBSHW INFLUENZA, FLUARIX, (AGE 6 MO+),  IM, PF, 0.5 ML: Performed by: FAMILY MEDICINE

## 2022-10-04 PROCEDURE — 90686 IIV4 VACC NO PRSV 0.5 ML IM: CPT | Performed by: FAMILY MEDICINE

## 2023-01-03 RX ORDER — FINASTERIDE 5 MG/1
TABLET, FILM COATED ORAL
Qty: 90 TABLET | Refills: 1 | Status: SHIPPED | OUTPATIENT
Start: 2023-01-03

## 2023-01-03 NOTE — TELEPHONE ENCOUNTER
Blanca Soni called requesting a refill of the below medication which has been pended for you:     Requested Prescriptions     Pending Prescriptions Disp Refills    finasteride (PROSCAR) 5 MG tablet [Pharmacy Med Name: FINASTERIDE 5MG TABS] 90 tablet 1     Sig: TAKE 1 TABLET BY MOUTH ONE TIME A DAY       Last Appointment Date: 8/16/2022  Next Appointment Date: 2/16/2023    Allergies   Allergen Reactions    Niaspan [Niacin] Hives

## 2023-02-06 ENCOUNTER — HOSPITAL ENCOUNTER (OUTPATIENT)
Age: 63
Discharge: HOME | End: 2023-02-06

## 2023-02-06 DIAGNOSIS — E11.9 TYPE 2 DIABETES MELLITUS WITHOUT COMPLICATION, WITHOUT LONG-TERM CURRENT USE OF INSULIN (HCC): ICD-10-CM

## 2023-02-06 LAB
ANION GAP SERPL CALCULATED.3IONS-SCNC: 9 MMOL/L (ref 9–17)
BUN SERPL-MCNC: 14 MG/DL (ref 8–23)
BUN/CREAT BLD: 15 (ref 9–20)
CALCIUM SERPL-MCNC: 9.3 MG/DL (ref 8.6–10.4)
CHLORIDE SERPL-SCNC: 104 MMOL/L (ref 98–107)
CHOLEST SERPL-MCNC: 179 MG/DL
CHOLESTEROL/HDL RATIO: 4.1
CO2 SERPL-SCNC: 28 MMOL/L (ref 20–31)
CREAT SERPL-MCNC: 0.94 MG/DL (ref 0.7–1.2)
CREATININE URINE: 102.6 MG/DL (ref 39–259)
EST. AVERAGE GLUCOSE BLD GHB EST-MCNC: 120 MG/DL
GFR SERPL CREATININE-BSD FRML MDRD: >60 ML/MIN/1.73M2
GLUCOSE SERPL-MCNC: 167 MG/DL (ref 70–99)
HBA1C MFR BLD: 5.8 % (ref 4–6)
HDLC SERPL-MCNC: 44 MG/DL
LDLC SERPL CALC-MCNC: 119 MG/DL (ref 0–130)
MICROALBUMIN/CREAT 24H UR: 18 MG/L
MICROALBUMIN/CREAT UR-RTO: 18 MCG/MG CREAT
POTASSIUM SERPL-SCNC: 4.9 MMOL/L (ref 3.7–5.3)
SODIUM SERPL-SCNC: 141 MMOL/L (ref 135–144)
TRIGL SERPL-MCNC: 79 MG/DL

## 2023-02-06 PROCEDURE — 82043 UR ALBUMIN QUANTITATIVE: CPT

## 2023-02-06 PROCEDURE — 36415 COLL VENOUS BLD VENIPUNCTURE: CPT

## 2023-02-06 PROCEDURE — 82570 ASSAY OF URINE CREATININE: CPT

## 2023-02-06 PROCEDURE — 80048 BASIC METABOLIC PNL TOTAL CA: CPT

## 2023-02-06 PROCEDURE — 83036 HEMOGLOBIN GLYCOSYLATED A1C: CPT

## 2023-02-06 PROCEDURE — 80061 LIPID PANEL: CPT

## 2023-02-16 ENCOUNTER — OFFICE VISIT (OUTPATIENT)
Dept: FAMILY MEDICINE CLINIC | Age: 63
End: 2023-02-16

## 2023-02-16 VITALS
OXYGEN SATURATION: 99 % | SYSTOLIC BLOOD PRESSURE: 130 MMHG | WEIGHT: 173.5 LBS | DIASTOLIC BLOOD PRESSURE: 80 MMHG | TEMPERATURE: 98.7 F | BODY MASS INDEX: 28.91 KG/M2 | HEART RATE: 87 BPM | HEIGHT: 65 IN

## 2023-02-16 DIAGNOSIS — E78.2 MIXED HYPERLIPIDEMIA: ICD-10-CM

## 2023-02-16 DIAGNOSIS — R39.11 BENIGN PROSTATIC HYPERPLASIA WITH URINARY HESITANCY: ICD-10-CM

## 2023-02-16 DIAGNOSIS — N40.1 BENIGN PROSTATIC HYPERPLASIA WITH URINARY HESITANCY: ICD-10-CM

## 2023-02-16 DIAGNOSIS — E11.9 TYPE 2 DIABETES MELLITUS WITHOUT COMPLICATION, WITHOUT LONG-TERM CURRENT USE OF INSULIN (HCC): Primary | ICD-10-CM

## 2023-02-16 DIAGNOSIS — I10 PRIMARY HYPERTENSION: ICD-10-CM

## 2023-02-16 PROCEDURE — 3075F SYST BP GE 130 - 139MM HG: CPT | Performed by: FAMILY MEDICINE

## 2023-02-16 PROCEDURE — 3044F HG A1C LEVEL LT 7.0%: CPT | Performed by: FAMILY MEDICINE

## 2023-02-16 PROCEDURE — 99214 OFFICE O/P EST MOD 30 MIN: CPT | Performed by: FAMILY MEDICINE

## 2023-02-16 PROCEDURE — 3079F DIAST BP 80-89 MM HG: CPT | Performed by: FAMILY MEDICINE

## 2023-02-16 PROCEDURE — 99212 OFFICE O/P EST SF 10 MIN: CPT

## 2023-02-16 SDOH — ECONOMIC STABILITY: HOUSING INSECURITY
IN THE LAST 12 MONTHS, WAS THERE A TIME WHEN YOU DID NOT HAVE A STEADY PLACE TO SLEEP OR SLEPT IN A SHELTER (INCLUDING NOW)?: PATIENT REFUSED

## 2023-02-16 SDOH — ECONOMIC STABILITY: FOOD INSECURITY: WITHIN THE PAST 12 MONTHS, YOU WORRIED THAT YOUR FOOD WOULD RUN OUT BEFORE YOU GOT MONEY TO BUY MORE.: PATIENT DECLINED

## 2023-02-16 SDOH — ECONOMIC STABILITY: INCOME INSECURITY: HOW HARD IS IT FOR YOU TO PAY FOR THE VERY BASICS LIKE FOOD, HOUSING, MEDICAL CARE, AND HEATING?: PATIENT DECLINED

## 2023-02-16 SDOH — ECONOMIC STABILITY: FOOD INSECURITY: WITHIN THE PAST 12 MONTHS, THE FOOD YOU BOUGHT JUST DIDN'T LAST AND YOU DIDN'T HAVE MONEY TO GET MORE.: PATIENT DECLINED

## 2023-02-16 ASSESSMENT — ENCOUNTER SYMPTOMS
ABDOMINAL PAIN: 0
SHORTNESS OF BREATH: 0
WHEEZING: 0
COUGH: 0
CHEST TIGHTNESS: 0
CONSTIPATION: 0
DIARRHEA: 0

## 2023-02-16 ASSESSMENT — PATIENT HEALTH QUESTIONNAIRE - PHQ9
SUM OF ALL RESPONSES TO PHQ QUESTIONS 1-9: 0
SUM OF ALL RESPONSES TO PHQ9 QUESTIONS 1 & 2: 0
2. FEELING DOWN, DEPRESSED OR HOPELESS: 0
9. THOUGHTS THAT YOU WOULD BE BETTER OFF DEAD, OR OF HURTING YOURSELF: 0
SUM OF ALL RESPONSES TO PHQ QUESTIONS 1-9: 0
4. FEELING TIRED OR HAVING LITTLE ENERGY: 0
8. MOVING OR SPEAKING SO SLOWLY THAT OTHER PEOPLE COULD HAVE NOTICED. OR THE OPPOSITE, BEING SO FIGETY OR RESTLESS THAT YOU HAVE BEEN MOVING AROUND A LOT MORE THAN USUAL: 0
10. IF YOU CHECKED OFF ANY PROBLEMS, HOW DIFFICULT HAVE THESE PROBLEMS MADE IT FOR YOU TO DO YOUR WORK, TAKE CARE OF THINGS AT HOME, OR GET ALONG WITH OTHER PEOPLE: 0
SUM OF ALL RESPONSES TO PHQ QUESTIONS 1-9: 0
1. LITTLE INTEREST OR PLEASURE IN DOING THINGS: 0
SUM OF ALL RESPONSES TO PHQ QUESTIONS 1-9: 0
7. TROUBLE CONCENTRATING ON THINGS, SUCH AS READING THE NEWSPAPER OR WATCHING TELEVISION: 0
5. POOR APPETITE OR OVEREATING: 0
6. FEELING BAD ABOUT YOURSELF - OR THAT YOU ARE A FAILURE OR HAVE LET YOURSELF OR YOUR FAMILY DOWN: 0
3. TROUBLE FALLING OR STAYING ASLEEP: 0

## 2023-02-16 NOTE — PROGRESS NOTES
MARLA Syed [address] [place] [address]  Dept: [phone]  Dept Fax: [phone]  Loc: [phone]    Suni Shaw is a 60 y.o. male who presents today for his medical conditions/complaints as noted below. Suni Shaw is c/o of   Chief Complaint   Patient presents with    Diabetes     6 month       HPI:     HPI Here today for a follow up of his DM, HTN and BPH. DM: stable; he has not lost any weight because he eats a lot of potato chips. He does not check his blood sugars regularly. He stopped his metformin about 6 months ago at my advice. He has not had any issues with neuropathy in his feet. No issues with chest pain or shortness of breath. He is still walking daily which helps keep him feeling good. HTN: stable; his blood  pressure is well controlled. No issues with leg swelling. No issues with regular headaches. No issues with vision changes. No issues with lightheadedness or dizziness upon standing. BPH: stable; he has been doing well on the proscar, but he would like to discontinue it if possible. I told him he can try stopping it but if he has urinary retention then he has not restart it.     Past Medical History:   Diagnosis Date    Anxiety     Depression     Erectile dysfunction     Fatigue     Hyperlipidemia     Hypertension     Impaired fasting glucose     Memory problem     Sleep disorder     Tremor           Social History     Tobacco Use    Smoking status: Former     Packs/day: 2.00     Years: 20.00     Pack years: 40.00     Types: Cigarettes     Start date: 6/15/1974     Quit date: 6/15/1989     Years since quittin.6    Smokeless tobacco: Never    Tobacco comments:     quit over 25 years ago   Substance Use Topics    Alcohol use: No     Comment: quit 12 years ago     Current Outpatient Medications   Medication Sig Dispense Refill    finasteride (PROSCAR) 5 MG tablet TAKE 1 TABLET BY MOUTH ONE TIME A DAY 90 tablet 1    enalapril (VASOTEC) 20 MG tablet TAKE 1 TABLET BY MOUTH ONE TIME A DAY 90 tablet 1    metoprolol succinate (TOPROL XL) 25 MG extended release tablet TAKE 1 TABLET BY MOUTH ONE TIME A DAY 90 tablet 1    loratadine (CLARITIN) 10 MG tablet Take 10 mg by mouth daily      Cholecalciferol (VITAMIN D3 PO) Take 2,000 Units by mouth every other day      glucose blood VI test strips (ASCENSIA AUTODISC VI;ONE TOUCH ULTRA TEST VI) strip 1 each by In Vitro route daily As needed. 100 each 3     No current facility-administered medications for this visit. Allergies   Allergen Reactions    Niaspan [Niacin] Hives       Subjective:     Review of Systems   Constitutional:  Negative for activity change, appetite change, chills and fever. Eyes:  Negative for visual disturbance. Respiratory:  Negative for cough, chest tightness, shortness of breath and wheezing. Cardiovascular:  Negative for palpitations and leg swelling. Gastrointestinal:  Negative for abdominal pain, constipation and diarrhea. Genitourinary:  Negative for difficulty urinating. Skin:  Negative for rash. Neurological:  Negative for syncope and light-headedness. Psychiatric/Behavioral:  Negative for decreased concentration, dysphoric mood and sleep disturbance. Objective:      Physical Exam  Vitals and nursing note reviewed. Constitutional:       General: He is not in acute distress. Appearance: He is well-developed. HENT:      Right Ear: Tympanic membrane, ear canal and external ear normal.      Left Ear: Tympanic membrane, ear canal and external ear normal.   Eyes:      Conjunctiva/sclera: Conjunctivae normal.   Cardiovascular:      Rate and Rhythm: Normal rate and regular rhythm. Heart sounds: Normal heart sounds. No murmur heard. Pulmonary:      Effort: Pulmonary effort is normal. No respiratory distress. Breath sounds: Normal breath sounds. No wheezing or rales. Musculoskeletal:         General: Normal range of motion. Cervical back: Normal range of motion and neck supple. Lymphadenopathy:      Cervical: No cervical adenopathy. Skin:     General: Skin is warm and dry. Findings: No rash. Neurological:      Mental Status: He is alert and oriented to person, place, and time. Psychiatric:         Mood and Affect: Mood normal.         Behavior: Behavior normal.         Thought Content: Thought content normal.         Judgment: Judgment normal.     /80   Pulse 87   Temp 98.7 °F (37.1 °C)   Ht 5' 5\" (1.651 m)   Wt 173 lb 8 oz (78.7 kg)   SpO2 99%   BMI 28.87 kg/m²     Assessment:       Diagnosis Orders   1. Type 2 diabetes mellitus without complication, without long-term current use of insulin (HCC)  Hemoglobin A1C      2. Primary hypertension  Basic Metabolic Panel      3. Benign prostatic hyperplasia with urinary hesitancy        4. Mixed hyperlipidemia  Lipid Panel         No visits with results within 1 Day(s) from this visit. Latest known visit with results is:   Hospital Outpatient Visit on 02/06/2023   Component Date Value Ref Range Status    Cholesterol 02/06/2023 179  <200 mg/dL Final    HDL 02/06/2023 44  >40 mg/dL Final    LDL Cholesterol 02/06/2023 119  0 - 130 mg/dL Final    Chol/HDL Ratio 02/06/2023 4.1  <5 Final    Triglycerides 02/06/2023 79  <150 mg/dL Final    Microalb, Ur 02/06/2023 18  <21 mg/L Final    Creatinine, Ur 02/06/2023 102.6  39.0 - 259.0 mg/dL Final    Microalb/Crt.  Ratio 02/06/2023 18 (A)  <17 mcg/mg creat Final    Hemoglobin A1C 02/06/2023 5.8  4.0 - 6.0 % Final    Estimated Avg Glucose 02/06/2023 120  mg/dL Final    Glucose 02/06/2023 167 (A)  70 - 99 mg/dL Final    BUN 02/06/2023 14  8 - 23 mg/dL Final    Creatinine 02/06/2023 0.94  0.70 - 1.20 mg/dL Final    Est, Glom Filt Rate 02/06/2023 >60  >60 mL/min/1.73m2 Final    Bun/Cre Ratio 02/06/2023 15  9 - 20 Final    Calcium 02/06/2023 9.3  8.6 - 10.4 mg/dL Final Sodium 02/06/2023 141  135 - 144 mmol/L Final    Potassium 02/06/2023 4.9  3.7 - 5.3 mmol/L Final    Chloride 02/06/2023 104  98 - 107 mmol/L Final    CO2 02/06/2023 28  20 - 31 mmol/L Final    Anion Gap 02/06/2023 9  9 - 17 mmol/L Final            Plan:       Dm: stable; even after stopping the metformin his a1c is still 5.8 so I will continue him off of medication. HTN: stable; his blood pressure has been well controlled so I will continue his current medications. BPH: stable; he would like to stop his proscar so he is going to try stopping it to see how things go. Return in about 1 year (around 2/16/2024) for DM follow up, HTN follow up. Orders Placed This Encounter   Procedures    Basic Metabolic Panel     Standing Status:   Future     Standing Expiration Date:   8/16/2024    Lipid Panel     Standing Status:   Future     Standing Expiration Date:   8/16/2024     Order Specific Question:   Is Patient Fasting?/# of Hours     Answer:   0     Order Specific Question:   Has the patient fasted? Answer:   No    Hemoglobin A1C     Standing Status:   Future     Standing Expiration Date:   8/16/2024         Patientgiven educational materials - see patient instructions. Discussed use, benefit,and side effects of prescribed medications. All patient questions answered. Ptvoiced understanding. Reviewed health maintenance. Instructed to continue currentmedications, diet and exercise. Patient agreed with treatment plan. Follow up asdirected.      Electronically signed by Frankey Delaware, MD on 2/16/2023 at 4:02 PM

## 2023-02-22 ENCOUNTER — IMMUNIZATION (OUTPATIENT)
Dept: LAB | Age: 63
End: 2023-02-22

## 2023-02-22 PROCEDURE — 0134A COVID-19, MODERNA BIVALENT BOOSTER, (AGE 12Y+), IM, 50 MCG/0.5 ML: CPT | Performed by: FAMILY MEDICINE

## 2023-02-22 PROCEDURE — PBSHW COVID-19, MODERNA BIVALENT BOOSTER, (AGE 12Y+), IM, 50 MCG/0.5 ML: Performed by: FAMILY MEDICINE

## 2023-04-04 DIAGNOSIS — R00.0 TACHYCARDIA: ICD-10-CM

## 2023-04-04 DIAGNOSIS — I10 ESSENTIAL HYPERTENSION: ICD-10-CM

## 2023-04-04 DIAGNOSIS — I10 ESSENTIAL HYPERTENSION, BENIGN: ICD-10-CM

## 2023-04-04 RX ORDER — METOPROLOL SUCCINATE 25 MG/1
TABLET, EXTENDED RELEASE ORAL
Qty: 90 TABLET | Refills: 1 | Status: SHIPPED | OUTPATIENT
Start: 2023-04-04

## 2023-04-04 RX ORDER — ENALAPRIL MALEATE 20 MG/1
TABLET ORAL
Qty: 90 TABLET | Refills: 1 | Status: SHIPPED | OUTPATIENT
Start: 2023-04-04

## 2023-04-04 NOTE — TELEPHONE ENCOUNTER
Isai Hoff called requesting a refill of the below medication which has been pended for you:     Requested Prescriptions     Pending Prescriptions Disp Refills    enalapril (VASOTEC) 20 MG tablet [Pharmacy Med Name: ENALAPRIL MALEATE 20MG TABS] 90 tablet 1     Sig: TAKE 1 TABLET BY MOUTH ONE TIME A DAY    metoprolol succinate (TOPROL XL) 25 MG extended release tablet [Pharmacy Med Name: METOPROLOL SUCCINATE ER 25MG TB24] 90 tablet 1     Sig: TAKE 1 TABLET BY MOUTH ONE TIME A DAY       Last Appointment Date: 2/16/2023  Next Appointment Date: 2/19/2024    Allergies   Allergen Reactions    Niaspan [Niacin] Hives

## 2023-09-25 DIAGNOSIS — I10 ESSENTIAL HYPERTENSION, BENIGN: ICD-10-CM

## 2023-09-25 DIAGNOSIS — I10 ESSENTIAL HYPERTENSION: ICD-10-CM

## 2023-09-25 DIAGNOSIS — R00.0 TACHYCARDIA: ICD-10-CM

## 2023-09-25 RX ORDER — ENALAPRIL MALEATE 20 MG/1
20 TABLET ORAL DAILY
Qty: 90 TABLET | Refills: 1 | Status: SHIPPED | OUTPATIENT
Start: 2023-09-25

## 2023-09-25 RX ORDER — METOPROLOL SUCCINATE 25 MG/1
TABLET, EXTENDED RELEASE ORAL
Qty: 90 TABLET | Refills: 1 | Status: SHIPPED | OUTPATIENT
Start: 2023-09-25

## 2023-10-10 ENCOUNTER — IMMUNIZATION (OUTPATIENT)
Dept: LAB | Age: 63
End: 2023-10-10

## 2023-10-10 PROCEDURE — PBSHW INFLUENZA, FLUARIX, (AGE 6 MO+),  IM, PF, 0.5 ML: Performed by: FAMILY MEDICINE

## 2023-10-10 PROCEDURE — 90686 IIV4 VACC NO PRSV 0.5 ML IM: CPT | Performed by: FAMILY MEDICINE

## 2023-11-06 ENCOUNTER — OFFICE VISIT (OUTPATIENT)
Dept: PRIMARY CARE CLINIC | Age: 63
End: 2023-11-06

## 2023-11-06 ENCOUNTER — HOSPITAL ENCOUNTER (OUTPATIENT)
Age: 63
Discharge: HOME | End: 2023-11-06

## 2023-11-06 VITALS
WEIGHT: 172.13 LBS | HEART RATE: 80 BPM | SYSTOLIC BLOOD PRESSURE: 140 MMHG | TEMPERATURE: 96.6 F | OXYGEN SATURATION: 98 % | RESPIRATION RATE: 15 BRPM | DIASTOLIC BLOOD PRESSURE: 90 MMHG | HEIGHT: 65 IN | BODY MASS INDEX: 28.68 KG/M2

## 2023-11-06 DIAGNOSIS — R30.0 DYSURIA: ICD-10-CM

## 2023-11-06 DIAGNOSIS — N30.01 ACUTE CYSTITIS WITH HEMATURIA: ICD-10-CM

## 2023-11-06 DIAGNOSIS — N30.01 ACUTE CYSTITIS WITH HEMATURIA: Primary | ICD-10-CM

## 2023-11-06 LAB
BACTERIA URNS QL MICRO: ABNORMAL
BILIRUB UR QL STRIP: ABNORMAL
CHARACTER UR: ABNORMAL
CLARITY UR: CLEAR
COLOR UR: ABNORMAL
COMMENT: ABNORMAL
EPI CELLS #/AREA URNS HPF: ABNORMAL /HPF (ref 0–5)
GLUCOSE UR STRIP-MCNC: ABNORMAL MG/DL
HGB UR QL STRIP.AUTO: NEGATIVE
KETONES UR STRIP-MCNC: NEGATIVE MG/DL
LEUKOCYTE ESTERASE UR QL STRIP: NEGATIVE
NITRITE UR QL STRIP: POSITIVE
PH UR STRIP: 6 [PH] (ref 5–6)
PROT UR STRIP-MCNC: ABNORMAL MG/DL
RBC #/AREA URNS HPF: ABNORMAL /HPF (ref 0–4)
SP GR UR STRIP: 1.01 (ref 1.01–1.02)
UROBILINOGEN UR STRIP-ACNC: ABNORMAL EU/DL (ref 0–1)
WBC #/AREA URNS HPF: ABNORMAL /HPF (ref 0–4)

## 2023-11-06 PROCEDURE — 3080F DIAST BP >= 90 MM HG: CPT | Performed by: FAMILY MEDICINE

## 2023-11-06 PROCEDURE — 99213 OFFICE O/P EST LOW 20 MIN: CPT | Performed by: FAMILY MEDICINE

## 2023-11-06 PROCEDURE — 81001 URINALYSIS AUTO W/SCOPE: CPT

## 2023-11-06 PROCEDURE — 87086 URINE CULTURE/COLONY COUNT: CPT

## 2023-11-06 PROCEDURE — 3077F SYST BP >= 140 MM HG: CPT | Performed by: FAMILY MEDICINE

## 2023-11-06 RX ORDER — PHENAZOPYRIDINE HYDROCHLORIDE 200 MG/1
200 TABLET, FILM COATED ORAL 3 TIMES DAILY PRN
Qty: 30 TABLET | Refills: 0 | Status: SHIPPED | OUTPATIENT
Start: 2023-11-06

## 2023-11-06 RX ORDER — CIPROFLOXACIN 500 MG/1
500 TABLET, FILM COATED ORAL 2 TIMES DAILY
Qty: 14 TABLET | Refills: 0 | Status: SHIPPED | OUTPATIENT
Start: 2023-11-06

## 2023-11-06 ASSESSMENT — ENCOUNTER SYMPTOMS
NAUSEA: 0
VOMITING: 0

## 2023-11-06 NOTE — PROGRESS NOTES
uropathogen can be unreliable in many patient populations. Rapid screening tests are less sensitive than culture and if UTI is a clinical possibility, culture should be considered despite a negative urinalysis. Plan:       UTI: new; his UA shows she has a UTI. I will treat with cipro for 7 days and follow up on the culture. If the culture shows resistance to cipro then he will be notified and his antibiotic will be changed. I encouraged his to use tylenol or ibuprofen for pain and he can continue the pyridium if it is helping. he was told to return to the clinic or ER if symptoms worsen, if he develops fevers or severe back pain. he understood and agreed with the plan. Return if symptoms worsen or fail to improve. Orders Placed This Encounter   Procedures    Culture, Urine     Standing Status:   Future     Standing Expiration Date:   11/6/2024    Urinalysis with Reflex to Culture     Standing Status:   Future     Number of Occurrences:   1     Standing Expiration Date:   11/6/2024     Order Specific Question:   SPECIFY(EX-CATH,MIDSTREAM,CYSTO,ETC)? Answer:   mid     Orders Placed This Encounter   Medications    ciprofloxacin (CIPRO) 500 MG tablet     Sig: Take 1 tablet by mouth 2 times daily     Dispense:  14 tablet     Refill:  0    phenazopyridine (PYRIDIUM) 200 MG tablet     Sig: Take 1 tablet by mouth 3 times daily as needed for Pain     Dispense:  30 tablet     Refill:  0       Patientgiven educational materials - see patient instructions. Discussed use, benefit,and side effects of prescribed medications. All patient questions answered. Ptvoiced understanding. Reviewed health maintenance. Instructed to continue currentmedications, diet and exercise. Patient agreed with treatment plan. Follow up asdirected.      Electronically signed by Benedict Fernando MD on 11/6/2023 at 11:11 AM

## 2023-11-07 LAB
MICROORGANISM SPEC CULT: NORMAL
SPECIMEN DESCRIPTION: NORMAL

## 2024-02-05 ENCOUNTER — OFFICE VISIT (OUTPATIENT)
Dept: PRIMARY CARE CLINIC | Age: 64
End: 2024-02-05

## 2024-02-05 ENCOUNTER — HOSPITAL ENCOUNTER (OUTPATIENT)
Age: 64
Discharge: HOME | End: 2024-02-05

## 2024-02-05 VITALS
HEART RATE: 89 BPM | TEMPERATURE: 98 F | OXYGEN SATURATION: 97 % | DIASTOLIC BLOOD PRESSURE: 86 MMHG | BODY MASS INDEX: 28.16 KG/M2 | SYSTOLIC BLOOD PRESSURE: 110 MMHG | HEIGHT: 65 IN | WEIGHT: 169 LBS

## 2024-02-05 DIAGNOSIS — E11.9 TYPE 2 DIABETES MELLITUS WITHOUT COMPLICATION, WITHOUT LONG-TERM CURRENT USE OF INSULIN (HCC): ICD-10-CM

## 2024-02-05 DIAGNOSIS — J40 BRONCHITIS: Primary | ICD-10-CM

## 2024-02-05 DIAGNOSIS — E78.2 MIXED HYPERLIPIDEMIA: ICD-10-CM

## 2024-02-05 DIAGNOSIS — I10 PRIMARY HYPERTENSION: ICD-10-CM

## 2024-02-05 LAB
ANION GAP SERPL CALCULATED.3IONS-SCNC: 11 MMOL/L (ref 9–17)
BUN SERPL-MCNC: 12 MG/DL (ref 8–23)
BUN/CREAT SERPL: 13 (ref 9–20)
CALCIUM SERPL-MCNC: 9.2 MG/DL (ref 8.6–10.4)
CHLORIDE SERPL-SCNC: 98 MMOL/L (ref 98–107)
CHOLEST SERPL-MCNC: 168 MG/DL
CHOLESTEROL/HDL RATIO: 5.6
CO2 SERPL-SCNC: 29 MMOL/L (ref 20–31)
CREAT SERPL-MCNC: 0.9 MG/DL (ref 0.7–1.2)
EST. AVERAGE GLUCOSE BLD GHB EST-MCNC: 128 MG/DL
GFR SERPL CREATININE-BSD FRML MDRD: >60 ML/MIN/1.73M2
GLUCOSE SERPL-MCNC: 145 MG/DL (ref 70–99)
HBA1C MFR BLD: 6.1 % (ref 4–6)
HDLC SERPL-MCNC: 30 MG/DL
LDLC SERPL CALC-MCNC: 111 MG/DL (ref 0–130)
POTASSIUM SERPL-SCNC: 4.1 MMOL/L (ref 3.7–5.3)
SODIUM SERPL-SCNC: 138 MMOL/L (ref 135–144)
TRIGL SERPL-MCNC: 133 MG/DL

## 2024-02-05 PROCEDURE — 3079F DIAST BP 80-89 MM HG: CPT | Performed by: NURSE PRACTITIONER

## 2024-02-05 PROCEDURE — 3074F SYST BP LT 130 MM HG: CPT | Performed by: NURSE PRACTITIONER

## 2024-02-05 PROCEDURE — 80048 BASIC METABOLIC PNL TOTAL CA: CPT

## 2024-02-05 PROCEDURE — 99213 OFFICE O/P EST LOW 20 MIN: CPT | Performed by: NURSE PRACTITIONER

## 2024-02-05 PROCEDURE — 99212 OFFICE O/P EST SF 10 MIN: CPT | Performed by: NURSE PRACTITIONER

## 2024-02-05 PROCEDURE — 36415 COLL VENOUS BLD VENIPUNCTURE: CPT

## 2024-02-05 PROCEDURE — 80061 LIPID PANEL: CPT

## 2024-02-05 PROCEDURE — 83036 HEMOGLOBIN GLYCOSYLATED A1C: CPT

## 2024-02-05 RX ORDER — BENZONATATE 100 MG/1
100 CAPSULE ORAL 3 TIMES DAILY PRN
Qty: 30 CAPSULE | Refills: 0 | Status: SHIPPED | OUTPATIENT
Start: 2024-02-05 | End: 2024-02-15

## 2024-02-05 RX ORDER — VITAMIN E 268 MG
400 CAPSULE ORAL DAILY
COMMUNITY

## 2024-02-05 RX ORDER — AZITHROMYCIN 250 MG/1
250 TABLET, FILM COATED ORAL SEE ADMIN INSTRUCTIONS
Qty: 6 TABLET | Refills: 0 | Status: SHIPPED | OUTPATIENT
Start: 2024-02-05 | End: 2024-02-10

## 2024-02-05 RX ORDER — PREDNISONE 20 MG/1
20 TABLET ORAL 2 TIMES DAILY
Qty: 10 TABLET | Refills: 0 | Status: SHIPPED | OUTPATIENT
Start: 2024-02-05 | End: 2024-02-10

## 2024-02-05 ASSESSMENT — PATIENT HEALTH QUESTIONNAIRE - PHQ9
1. LITTLE INTEREST OR PLEASURE IN DOING THINGS: 0
SUM OF ALL RESPONSES TO PHQ QUESTIONS 1-9: 0
2. FEELING DOWN, DEPRESSED OR HOPELESS: 0
SUM OF ALL RESPONSES TO PHQ9 QUESTIONS 1 & 2: 0

## 2024-02-05 ASSESSMENT — ENCOUNTER SYMPTOMS
COUGH: 1
SHORTNESS OF BREATH: 1

## 2024-02-05 NOTE — PROGRESS NOTES
Subjective:      Patient ID: Roger Holman is a 63 y.o. male coming in for   Chief Complaint   Patient presents with    Cough     Sx began on Jan 31 st. Cough has caused weak stomach muscle. Can't do exercises.          Cough  This is a new problem. Episode onset: x7days. The cough is Non-productive. Associated symptoms include shortness of breath (with excessive coughing). Pertinent negatives include no fever.       Review of Systems   Constitutional:  Negative for fever.   Respiratory:  Positive for cough and shortness of breath (with excessive coughing).         Objective:/86   Pulse 89   Temp 98 °F (36.7 °C) (Tympanic)   Ht 1.651 m (5' 5\")   Wt 76.7 kg (169 lb)   SpO2 97%   BMI 28.12 kg/m²      Physical Exam  Vitals and nursing note reviewed.   Constitutional:       General: He is not in acute distress.     Appearance: Normal appearance. He is not ill-appearing.   HENT:      Head: Normocephalic.      Nose: Congestion and rhinorrhea present.      Mouth/Throat:      Mouth: Mucous membranes are moist.      Pharynx: Oropharynx is clear. No oropharyngeal exudate or posterior oropharyngeal erythema.   Cardiovascular:      Rate and Rhythm: Normal rate and regular rhythm.      Heart sounds: Normal heart sounds.   Pulmonary:      Effort: Pulmonary effort is normal. No respiratory distress.      Breath sounds: Normal breath sounds. No stridor. No wheezing, rhonchi or rales.      Comments: Dry cough during visit  Musculoskeletal:      Cervical back: Neck supple.      Left lower leg: No edema.   Lymphadenopathy:      Cervical: No cervical adenopathy.   Skin:     General: Skin is warm and dry.      Findings: No rash.   Neurological:      General: No focal deficit present.      Mental Status: He is alert and oriented to person, place, and time.          Assessment:      1. Bronchitis           Plan:    -symptoms and exam consistent with bronchitis. Treating as such with meds as prescribed  -f/u with pcp as

## 2024-02-11 ENCOUNTER — HOSPITAL ENCOUNTER (OUTPATIENT)
Age: 64
Setting detail: SPECIMEN
Discharge: HOME | End: 2024-02-11

## 2024-02-11 ENCOUNTER — OFFICE VISIT (OUTPATIENT)
Dept: PRIMARY CARE CLINIC | Age: 64
End: 2024-02-11

## 2024-02-11 VITALS
SYSTOLIC BLOOD PRESSURE: 130 MMHG | HEART RATE: 102 BPM | WEIGHT: 169 LBS | TEMPERATURE: 98.2 F | OXYGEN SATURATION: 97 % | DIASTOLIC BLOOD PRESSURE: 74 MMHG | BODY MASS INDEX: 28.12 KG/M2

## 2024-02-11 DIAGNOSIS — J40 BRONCHITIS: ICD-10-CM

## 2024-02-11 DIAGNOSIS — R31.9 HEMATURIA, UNSPECIFIED TYPE: ICD-10-CM

## 2024-02-11 DIAGNOSIS — R31.9 HEMATURIA, UNSPECIFIED TYPE: Primary | ICD-10-CM

## 2024-02-11 LAB
BACTERIA URNS QL MICRO: ABNORMAL
BILIRUB UR QL STRIP: ABNORMAL
CHARACTER UR: ABNORMAL
CLARITY UR: CLEAR
COLOR UR: ABNORMAL
COMMENT: ABNORMAL
EPI CELLS #/AREA URNS HPF: ABNORMAL /HPF (ref 0–5)
GLUCOSE UR STRIP-MCNC: ABNORMAL MG/DL
HGB UR QL STRIP.AUTO: ABNORMAL
KETONES UR STRIP-MCNC: NEGATIVE MG/DL
LEUKOCYTE ESTERASE UR QL STRIP: NEGATIVE
NITRITE UR QL STRIP: POSITIVE
PH UR STRIP: 6 [PH] (ref 5–6)
PROT UR STRIP-MCNC: ABNORMAL MG/DL
RBC #/AREA URNS HPF: ABNORMAL /HPF (ref 0–4)
SP GR UR STRIP: 1.02 (ref 1.01–1.02)
UROBILINOGEN UR STRIP-ACNC: ABNORMAL EU/DL (ref 0–1)
WBC #/AREA URNS HPF: ABNORMAL /HPF (ref 0–4)

## 2024-02-11 PROCEDURE — 81001 URINALYSIS AUTO W/SCOPE: CPT

## 2024-02-11 ASSESSMENT — PATIENT HEALTH QUESTIONNAIRE - PHQ9
2. FEELING DOWN, DEPRESSED OR HOPELESS: 0
SUM OF ALL RESPONSES TO PHQ QUESTIONS 1-9: 0
SUM OF ALL RESPONSES TO PHQ9 QUESTIONS 1 & 2: 0
SUM OF ALL RESPONSES TO PHQ QUESTIONS 1-9: 0
1. LITTLE INTEREST OR PLEASURE IN DOING THINGS: 0
SUM OF ALL RESPONSES TO PHQ QUESTIONS 1-9: 0
SUM OF ALL RESPONSES TO PHQ QUESTIONS 1-9: 0

## 2024-02-19 ENCOUNTER — HOSPITAL ENCOUNTER (OUTPATIENT)
Age: 64
Discharge: HOME | End: 2024-02-19

## 2024-02-19 ENCOUNTER — OFFICE VISIT (OUTPATIENT)
Dept: FAMILY MEDICINE CLINIC | Age: 64
End: 2024-02-19

## 2024-02-19 VITALS
HEIGHT: 65 IN | HEART RATE: 93 BPM | BODY MASS INDEX: 28.37 KG/M2 | SYSTOLIC BLOOD PRESSURE: 138 MMHG | DIASTOLIC BLOOD PRESSURE: 86 MMHG | WEIGHT: 170.3 LBS | OXYGEN SATURATION: 100 %

## 2024-02-19 DIAGNOSIS — R31.0 GROSS HEMATURIA: ICD-10-CM

## 2024-02-19 DIAGNOSIS — R31.9 HEMATURIA, UNSPECIFIED TYPE: ICD-10-CM

## 2024-02-19 DIAGNOSIS — R00.0 TACHYCARDIA: ICD-10-CM

## 2024-02-19 DIAGNOSIS — Z00.00 VISIT FOR WELL MAN HEALTH CHECK: Primary | ICD-10-CM

## 2024-02-19 DIAGNOSIS — I10 ESSENTIAL HYPERTENSION, BENIGN: ICD-10-CM

## 2024-02-19 DIAGNOSIS — E11.9 TYPE 2 DIABETES MELLITUS WITHOUT COMPLICATION, WITHOUT LONG-TERM CURRENT USE OF INSULIN (HCC): ICD-10-CM

## 2024-02-19 DIAGNOSIS — R05.1 ACUTE COUGH: ICD-10-CM

## 2024-02-19 DIAGNOSIS — E78.2 MIXED HYPERLIPIDEMIA: ICD-10-CM

## 2024-02-19 DIAGNOSIS — I10 PRIMARY HYPERTENSION: ICD-10-CM

## 2024-02-19 DIAGNOSIS — I10 ESSENTIAL HYPERTENSION: ICD-10-CM

## 2024-02-19 LAB
BILIRUB UR QL STRIP: NEGATIVE
CLARITY UR: CLEAR
COLOR UR: YELLOW
COMMENT: ABNORMAL
GLUCOSE UR STRIP-MCNC: ABNORMAL MG/DL
HGB UR QL STRIP.AUTO: NEGATIVE
KETONES UR STRIP-MCNC: NEGATIVE MG/DL
LEUKOCYTE ESTERASE UR QL STRIP: NEGATIVE
NITRITE UR QL STRIP: NEGATIVE
PH UR STRIP: 6 [PH] (ref 5–6)
PROT UR STRIP-MCNC: NEGATIVE MG/DL
SP GR UR STRIP: 1.02 (ref 1.01–1.02)
UROBILINOGEN UR STRIP-ACNC: NORMAL EU/DL (ref 0–1)

## 2024-02-19 PROCEDURE — 3079F DIAST BP 80-89 MM HG: CPT | Performed by: FAMILY MEDICINE

## 2024-02-19 PROCEDURE — 99396 PREV VISIT EST AGE 40-64: CPT | Performed by: FAMILY MEDICINE

## 2024-02-19 PROCEDURE — 99396 PREV VISIT EST AGE 40-64: CPT

## 2024-02-19 PROCEDURE — 3075F SYST BP GE 130 - 139MM HG: CPT | Performed by: FAMILY MEDICINE

## 2024-02-19 PROCEDURE — 81003 URINALYSIS AUTO W/O SCOPE: CPT

## 2024-02-19 RX ORDER — FINASTERIDE 5 MG/1
5 TABLET, FILM COATED ORAL DAILY
Qty: 90 TABLET | Refills: 1 | Status: SHIPPED | OUTPATIENT
Start: 2024-02-19

## 2024-02-19 RX ORDER — ENALAPRIL MALEATE 20 MG/1
20 TABLET ORAL DAILY
Qty: 90 TABLET | Refills: 1 | Status: SHIPPED | OUTPATIENT
Start: 2024-02-19

## 2024-02-19 RX ORDER — METOPROLOL SUCCINATE 25 MG/1
TABLET, EXTENDED RELEASE ORAL
Qty: 90 TABLET | Refills: 1 | Status: SHIPPED | OUTPATIENT
Start: 2024-02-19

## 2024-02-19 SDOH — ECONOMIC STABILITY: INCOME INSECURITY: HOW HARD IS IT FOR YOU TO PAY FOR THE VERY BASICS LIKE FOOD, HOUSING, MEDICAL CARE, AND HEATING?: NOT HARD AT ALL

## 2024-02-19 SDOH — ECONOMIC STABILITY: FOOD INSECURITY: WITHIN THE PAST 12 MONTHS, YOU WORRIED THAT YOUR FOOD WOULD RUN OUT BEFORE YOU GOT MONEY TO BUY MORE.: NEVER TRUE

## 2024-02-19 SDOH — ECONOMIC STABILITY: FOOD INSECURITY: WITHIN THE PAST 12 MONTHS, THE FOOD YOU BOUGHT JUST DIDN'T LAST AND YOU DIDN'T HAVE MONEY TO GET MORE.: NEVER TRUE

## 2024-02-19 SDOH — ECONOMIC STABILITY: HOUSING INSECURITY
IN THE LAST 12 MONTHS, WAS THERE A TIME WHEN YOU DID NOT HAVE A STEADY PLACE TO SLEEP OR SLEPT IN A SHELTER (INCLUDING NOW)?: NO

## 2024-02-19 ASSESSMENT — ENCOUNTER SYMPTOMS
SHORTNESS OF BREATH: 0
COUGH: 0
CHEST TIGHTNESS: 0
NAUSEA: 0
ABDOMINAL PAIN: 0
CONSTIPATION: 0
DIARRHEA: 0
WHEEZING: 0

## 2024-02-19 NOTE — PROGRESS NOTES
[facility]  [address] [facility] [address]  Dept: [phone]  Dept Fax: [phone]  Loc: [phone]    Roger Holman is a 63 y.o. male who presents today for his medical conditions/complaints as noted below.  Roger Holman is c/o of   Chief Complaint   Patient presents with    Annual Exam     Discuss UC visit on 2024       HPI:     HPI Here today for his well visit.     Recently he had some hematuria and he was having a lot of pain trying to urinate through the clots. The blood improved after drinking a lot of water. He went to  and his UA was normal. He has some burning with urination as well but that has improved some. He was supposed to do a UA prior to this appointment but he did not because someone was coughing in the lab. He had blood in his urine in  and he had to get a catheter put in.     He has had bronchitis for the past month. He still has a hoarse voice. He feels like he has been really weak and down for several weeks. It is a dry cough. He was struggling to get out of bed for a week or so. He was seen in UC and given antibiotics and steroids.     He has not had any chest pain. He has not been short of breath. He typically walks daily, but when he was sick he didn't walk for several days. No issues with abdominal pain or diarrhea.     Past Medical History:   Diagnosis Date    Anxiety     Depression     Erectile dysfunction     Fatigue     Hyperlipidemia     Hypertension     Impaired fasting glucose     Memory problem     Sleep disorder     Tremor           Social History     Tobacco Use    Smoking status: Former     Current packs/day: 0.00     Average packs/day: 2.0 packs/day for 20.0 years (40.0 ttl pk-yrs)     Types: Cigarettes     Start date: 6/15/1974     Quit date: 6/15/1989     Years since quittin.7    Smokeless tobacco: Never    Tobacco comments:     quit over 25 years ago

## 2024-02-19 NOTE — TELEPHONE ENCOUNTER
Roger called requesting a refill of the below medication which has been pended for you:     Requested Prescriptions     Pending Prescriptions Disp Refills    finasteride (PROSCAR) 5 MG tablet [Pharmacy Med Name: FINASTERIDE 5MG TABS] 90 tablet 1     Sig: TAKE ONE TABLET BY MOUTH ONCE A DAY       Last Appointment Date: 11/6/2023  Next Appointment Date: 2/19/2024    Allergies   Allergen Reactions    Niaspan [Niacin] Hives

## 2024-06-04 ENCOUNTER — HOSPITAL ENCOUNTER (OUTPATIENT)
Age: 64
Discharge: HOME | End: 2024-06-04
Payer: MEDICARE

## 2024-06-04 ENCOUNTER — OFFICE VISIT (OUTPATIENT)
Dept: FAMILY MEDICINE CLINIC | Age: 64
End: 2024-06-04
Payer: MEDICARE

## 2024-06-04 VITALS
BODY MASS INDEX: 29.17 KG/M2 | WEIGHT: 175.1 LBS | HEART RATE: 110 BPM | OXYGEN SATURATION: 98 % | HEIGHT: 65 IN | DIASTOLIC BLOOD PRESSURE: 82 MMHG | SYSTOLIC BLOOD PRESSURE: 146 MMHG

## 2024-06-04 DIAGNOSIS — M79.674 PAIN OF TOE OF RIGHT FOOT: Primary | ICD-10-CM

## 2024-06-04 DIAGNOSIS — E11.9 TYPE 2 DIABETES MELLITUS WITHOUT COMPLICATION, WITHOUT LONG-TERM CURRENT USE OF INSULIN (HCC): ICD-10-CM

## 2024-06-04 DIAGNOSIS — I10 ESSENTIAL HYPERTENSION: ICD-10-CM

## 2024-06-04 DIAGNOSIS — M79.674 PAIN OF TOE OF RIGHT FOOT: ICD-10-CM

## 2024-06-04 LAB
ANION GAP SERPL CALCULATED.3IONS-SCNC: 11 MMOL/L (ref 9–17)
BUN SERPL-MCNC: 17 MG/DL (ref 8–23)
BUN/CREAT SERPL: 15 (ref 9–20)
CALCIUM SERPL-MCNC: 9.9 MG/DL (ref 8.6–10.4)
CHLORIDE SERPL-SCNC: 100 MMOL/L (ref 98–107)
CO2 SERPL-SCNC: 29 MMOL/L (ref 20–31)
CREAT SERPL-MCNC: 1.1 MG/DL (ref 0.7–1.2)
GFR, ESTIMATED: 75 ML/MIN/1.73M2
GLUCOSE SERPL-MCNC: 193 MG/DL (ref 70–99)
POTASSIUM SERPL-SCNC: 4.2 MMOL/L (ref 3.7–5.3)
SODIUM SERPL-SCNC: 140 MMOL/L (ref 135–144)
URATE SERPL-MCNC: 6.6 MG/DL (ref 3.4–7)

## 2024-06-04 PROCEDURE — 3079F DIAST BP 80-89 MM HG: CPT | Performed by: FAMILY MEDICINE

## 2024-06-04 PROCEDURE — 3077F SYST BP >= 140 MM HG: CPT | Performed by: FAMILY MEDICINE

## 2024-06-04 PROCEDURE — G8427 DOCREV CUR MEDS BY ELIG CLIN: HCPCS | Performed by: FAMILY MEDICINE

## 2024-06-04 PROCEDURE — 2022F DILAT RTA XM EVC RTNOPTHY: CPT | Performed by: FAMILY MEDICINE

## 2024-06-04 PROCEDURE — 36415 COLL VENOUS BLD VENIPUNCTURE: CPT

## 2024-06-04 PROCEDURE — 3017F COLORECTAL CA SCREEN DOC REV: CPT | Performed by: FAMILY MEDICINE

## 2024-06-04 PROCEDURE — 84550 ASSAY OF BLOOD/URIC ACID: CPT

## 2024-06-04 PROCEDURE — 3044F HG A1C LEVEL LT 7.0%: CPT | Performed by: FAMILY MEDICINE

## 2024-06-04 PROCEDURE — G8419 CALC BMI OUT NRM PARAM NOF/U: HCPCS | Performed by: FAMILY MEDICINE

## 2024-06-04 PROCEDURE — 80048 BASIC METABOLIC PNL TOTAL CA: CPT

## 2024-06-04 PROCEDURE — 99214 OFFICE O/P EST MOD 30 MIN: CPT | Performed by: FAMILY MEDICINE

## 2024-06-04 PROCEDURE — 1036F TOBACCO NON-USER: CPT | Performed by: FAMILY MEDICINE

## 2024-06-04 PROCEDURE — G2211 COMPLEX E/M VISIT ADD ON: HCPCS | Performed by: FAMILY MEDICINE

## 2024-06-04 RX ORDER — ENALAPRIL MALEATE 20 MG/1
20 TABLET ORAL DAILY
Qty: 90 TABLET | Refills: 1 | Status: SHIPPED | OUTPATIENT
Start: 2024-06-04

## 2024-06-04 RX ORDER — PREDNISONE 20 MG/1
20 TABLET ORAL 2 TIMES DAILY
Qty: 10 TABLET | Refills: 0 | Status: SHIPPED | OUTPATIENT
Start: 2024-06-04 | End: 2024-06-09

## 2024-06-04 RX ORDER — GLIMEPIRIDE 1 MG/1
1 TABLET ORAL
Qty: 7 TABLET | Refills: 0 | Status: SHIPPED | OUTPATIENT
Start: 2024-06-04

## 2024-06-04 RX ORDER — FINASTERIDE 5 MG/1
5 TABLET, FILM COATED ORAL DAILY
Qty: 90 TABLET | Refills: 1 | Status: SHIPPED | OUTPATIENT
Start: 2024-06-04

## 2024-06-04 RX ORDER — METOPROLOL SUCCINATE 25 MG/1
TABLET, EXTENDED RELEASE ORAL
Qty: 90 TABLET | Refills: 1 | Status: SHIPPED | OUTPATIENT
Start: 2024-06-04

## 2024-06-04 ASSESSMENT — ENCOUNTER SYMPTOMS
WHEEZING: 0
SHORTNESS OF BREATH: 0
CHEST TIGHTNESS: 0
COUGH: 0

## 2024-06-04 NOTE — PROGRESS NOTES
regular rhythm.      Heart sounds: Normal heart sounds. No murmur heard.  Pulmonary:      Effort: Pulmonary effort is normal. No respiratory distress.      Breath sounds: Normal breath sounds. No wheezing or rales.   Musculoskeletal:      Cervical back: Normal range of motion and neck supple.      Right foot: Decreased range of motion. Swelling, tenderness and bony tenderness present.        Feet:    Lymphadenopathy:      Cervical: No cervical adenopathy.   Skin:     General: Skin is warm and dry.      Findings: No rash.   Neurological:      Mental Status: He is alert and oriented to person, place, and time.   Psychiatric:         Mood and Affect: Mood normal.         Behavior: Behavior normal.       BP (!) 146/82 (Site: Right Upper Arm, Position: Sitting, Cuff Size: Medium Adult) Comment: Patient stated he is in pain  Pulse (!) 110   Ht 1.651 m (5' 5\")   Wt 79.4 kg (175 lb 1.6 oz)   SpO2 98%   BMI 29.14 kg/m²     Assessment:       Diagnosis Orders   1. Pain of toe of right foot  Uric Acid    Basic Metabolic Panel      2. Type 2 diabetes mellitus without complication, without long-term current use of insulin (HCC)  Microalbumin, Ur      3. Essential hypertension  metoprolol succinate (TOPROL XL) 25 MG extended release tablet         Hospital Outpatient Visit on 06/04/2024   Component Date Value Ref Range Status    Sodium 06/04/2024 140  135 - 144 mmol/L Final    Potassium 06/04/2024 4.2  3.7 - 5.3 mmol/L Final    Chloride 06/04/2024 100  98 - 107 mmol/L Final    CO2 06/04/2024 29  20 - 31 mmol/L Final    Anion Gap 06/04/2024 11  9 - 17 mmol/L Final    Glucose 06/04/2024 193 (H)  70 - 99 mg/dL Final    BUN 06/04/2024 17  8 - 23 mg/dL Final    Creatinine 06/04/2024 1.1  0.7 - 1.2 mg/dL Final    Est, Glom Filt Rate 06/04/2024 75  >60 mL/min/1.73m2 Final    BUN/Creatinine Ratio 06/04/2024 15  9 - 20 Final    Calcium 06/04/2024 9.9  8.6 - 10.4 mg/dL Final    Uric Acid 06/04/2024 6.6  3.4 - 7.0 mg/dL Final

## 2024-07-24 RX ORDER — LORATADINE 10 MG/1
10 TABLET ORAL DAILY
Qty: 90 TABLET | Refills: 0 | Status: SHIPPED | OUTPATIENT
Start: 2024-07-24 | End: 2024-10-22

## 2024-07-24 NOTE — TELEPHONE ENCOUNTER
Roger called requesting a refill of the below medication which has been pended for you:     Requested Prescriptions     Pending Prescriptions Disp Refills    loratadine (CLARITIN) 10 MG tablet 30 tablet 0     Sig: Take 1 tablet by mouth daily       Last Appointment Date: 6/4/2024  Next Appointment Date: 2/20/2025    Allergies   Allergen Reactions    Niaspan [Niacin] Hives

## 2024-09-10 ENCOUNTER — HOSPITAL ENCOUNTER (OUTPATIENT)
Dept: GENERAL RADIOLOGY | Age: 64
Discharge: HOME | End: 2024-09-12
Payer: MEDICARE

## 2024-09-10 ENCOUNTER — OFFICE VISIT (OUTPATIENT)
Dept: PRIMARY CARE CLINIC | Age: 64
End: 2024-09-10
Payer: MEDICARE

## 2024-09-10 VITALS
SYSTOLIC BLOOD PRESSURE: 112 MMHG | BODY MASS INDEX: 28.96 KG/M2 | HEART RATE: 70 BPM | WEIGHT: 174 LBS | TEMPERATURE: 98.2 F | OXYGEN SATURATION: 98 % | DIASTOLIC BLOOD PRESSURE: 74 MMHG

## 2024-09-10 DIAGNOSIS — M20.012 MALLET FINGER OF LEFT FINGER(S): Primary | ICD-10-CM

## 2024-09-10 DIAGNOSIS — M79.645 PAIN OF FINGER OF LEFT HAND: ICD-10-CM

## 2024-09-10 PROCEDURE — 3074F SYST BP LT 130 MM HG: CPT | Performed by: FAMILY MEDICINE

## 2024-09-10 PROCEDURE — G8419 CALC BMI OUT NRM PARAM NOF/U: HCPCS | Performed by: FAMILY MEDICINE

## 2024-09-10 PROCEDURE — G8427 DOCREV CUR MEDS BY ELIG CLIN: HCPCS | Performed by: FAMILY MEDICINE

## 2024-09-10 PROCEDURE — 1123F ACP DISCUSS/DSCN MKR DOCD: CPT | Performed by: FAMILY MEDICINE

## 2024-09-10 PROCEDURE — 99213 OFFICE O/P EST LOW 20 MIN: CPT | Performed by: FAMILY MEDICINE

## 2024-09-10 PROCEDURE — 73130 X-RAY EXAM OF HAND: CPT

## 2024-09-10 PROCEDURE — 99212 OFFICE O/P EST SF 10 MIN: CPT | Performed by: FAMILY MEDICINE

## 2024-09-10 PROCEDURE — 3078F DIAST BP <80 MM HG: CPT | Performed by: FAMILY MEDICINE

## 2024-09-10 PROCEDURE — 1036F TOBACCO NON-USER: CPT | Performed by: FAMILY MEDICINE

## 2024-09-10 PROCEDURE — 3017F COLORECTAL CA SCREEN DOC REV: CPT | Performed by: FAMILY MEDICINE

## 2024-09-10 ASSESSMENT — PATIENT HEALTH QUESTIONNAIRE - PHQ9
2. FEELING DOWN, DEPRESSED OR HOPELESS: NOT AT ALL
SUM OF ALL RESPONSES TO PHQ9 QUESTIONS 1 & 2: 0
SUM OF ALL RESPONSES TO PHQ QUESTIONS 1-9: 0
1. LITTLE INTEREST OR PLEASURE IN DOING THINGS: NOT AT ALL
SUM OF ALL RESPONSES TO PHQ QUESTIONS 1-9: 0

## 2024-10-16 ENCOUNTER — NURSE ONLY (OUTPATIENT)
Dept: LAB | Age: 64
End: 2024-10-16
Payer: MEDICARE

## 2024-10-16 DIAGNOSIS — Z23 NEED FOR VACCINATION: Primary | ICD-10-CM

## 2024-10-16 PROCEDURE — PBSHW COVID-19, COMIRNATY, (AGE 12Y+), IM, PF, 30MCG/0.3ML: Performed by: FAMILY MEDICINE

## 2024-10-16 PROCEDURE — 91320 SARSCV2 VAC 30MCG TRS-SUC IM: CPT | Performed by: FAMILY MEDICINE

## 2024-10-16 PROCEDURE — PBSHW INFLUENZA, FLUAD TRIVALENT, (AGE 65 Y+), IM, PRESERVATIVE FREE, 0.5ML: Performed by: FAMILY MEDICINE

## 2024-10-16 PROCEDURE — 90653 IIV ADJUVANT VACCINE IM: CPT | Performed by: FAMILY MEDICINE

## 2024-11-11 RX ORDER — LORATADINE 10 MG/1
10 TABLET ORAL DAILY
Qty: 90 TABLET | Refills: 0 | Status: SHIPPED | OUTPATIENT
Start: 2024-11-11

## 2024-11-11 NOTE — TELEPHONE ENCOUNTER
Roger called requesting a refill of the below medication which has been pended for you:     Requested Prescriptions     Pending Prescriptions Disp Refills    ALLERGY RELIEF 10 MG tablet [Pharmacy Med Name: LORATADINE 10MG TABLET] 90 tablet 0     Sig: TAKE ONE TABLET BY MOUTH ONCE A DAY       Last Appointment Date: 6/4/2024  Next Appointment Date: 2/20/2025    Allergies   Allergen Reactions    Niaspan [Niacin] Hives

## 2024-12-13 DIAGNOSIS — I10 ESSENTIAL HYPERTENSION: ICD-10-CM

## 2024-12-13 RX ORDER — METOPROLOL SUCCINATE 25 MG/1
TABLET, EXTENDED RELEASE ORAL
Qty: 90 TABLET | Refills: 1 | Status: SHIPPED | OUTPATIENT
Start: 2024-12-13

## 2024-12-13 RX ORDER — ENALAPRIL MALEATE 20 MG/1
20 TABLET ORAL DAILY
Qty: 90 TABLET | Refills: 1 | Status: SHIPPED | OUTPATIENT
Start: 2024-12-13

## 2024-12-13 RX ORDER — FINASTERIDE 5 MG/1
5 TABLET, FILM COATED ORAL DAILY
Qty: 90 TABLET | Refills: 1 | Status: SHIPPED | OUTPATIENT
Start: 2024-12-13

## 2024-12-13 NOTE — TELEPHONE ENCOUNTER
Roger called requesting a refill of the below medication which has been pended for you:     Requested Prescriptions     Pending Prescriptions Disp Refills    finasteride (PROSCAR) 5 MG tablet [Pharmacy Med Name: FINASTERIDE 5MG TABS] 90 tablet 1     Sig: TAKE ONE TABLET BY MOUTH ONCE A DAY    metoprolol succinate (TOPROL XL) 25 MG extended release tablet [Pharmacy Med Name: METOPROLOL SUCCINATE ER 25MG TB24] 90 tablet 1     Sig: TAKE ONE TABLET BY MOUTH ONCE A DAY    enalapril (VASOTEC) 20 MG tablet [Pharmacy Med Name: ENALAPRIL MALEATE 20MG TABS] 90 tablet 1     Sig: TAKE ONE TABLET BY MOUTH ONCE A DAY       Last Appointment Date: 6/4/2024  Next Appointment Date: 2/20/2025    Allergies   Allergen Reactions    Niaspan [Niacin] Hives

## 2025-02-10 ENCOUNTER — HOSPITAL ENCOUNTER (OUTPATIENT)
Age: 65
Discharge: HOME | End: 2025-02-10
Payer: MEDICARE

## 2025-02-10 DIAGNOSIS — E78.2 MIXED HYPERLIPIDEMIA: ICD-10-CM

## 2025-02-10 DIAGNOSIS — E11.9 TYPE 2 DIABETES MELLITUS WITHOUT COMPLICATION, WITHOUT LONG-TERM CURRENT USE OF INSULIN (HCC): ICD-10-CM

## 2025-02-10 DIAGNOSIS — I10 PRIMARY HYPERTENSION: ICD-10-CM

## 2025-02-10 LAB
ANION GAP SERPL CALCULATED.3IONS-SCNC: 11 MMOL/L (ref 9–17)
BUN SERPL-MCNC: 13 MG/DL (ref 8–23)
BUN/CREAT SERPL: 16 (ref 9–20)
CALCIUM SERPL-MCNC: 9.5 MG/DL (ref 8.6–10.4)
CHLORIDE SERPL-SCNC: 104 MMOL/L (ref 98–107)
CHOLEST SERPL-MCNC: 197 MG/DL (ref 0–199)
CHOLESTEROL/HDL RATIO: 4.3
CO2 SERPL-SCNC: 27 MMOL/L (ref 20–31)
CREAT SERPL-MCNC: 0.8 MG/DL (ref 0.7–1.2)
CREAT UR-MCNC: 164 MG/DL (ref 39–259)
GFR, ESTIMATED: >90 ML/MIN/1.73M2
GLUCOSE SERPL-MCNC: 161 MG/DL (ref 70–99)
HDLC SERPL-MCNC: 46 MG/DL
LDLC SERPL CALC-MCNC: 132 MG/DL (ref 0–100)
MICROALBUMIN UR-MCNC: 53 MG/L (ref 0–20)
MICROALBUMIN/CREAT UR-RTO: 32 MCG/MG CREAT (ref 0–17)
POTASSIUM SERPL-SCNC: 4.4 MMOL/L (ref 3.7–5.3)
SODIUM SERPL-SCNC: 142 MMOL/L (ref 135–144)
TRIGL SERPL-MCNC: 96 MG/DL
VLDLC SERPL CALC-MCNC: 19 MG/DL (ref 1–30)

## 2025-02-10 PROCEDURE — 83036 HEMOGLOBIN GLYCOSYLATED A1C: CPT

## 2025-02-10 PROCEDURE — 82570 ASSAY OF URINE CREATININE: CPT

## 2025-02-10 PROCEDURE — 80048 BASIC METABOLIC PNL TOTAL CA: CPT

## 2025-02-10 PROCEDURE — 80061 LIPID PANEL: CPT

## 2025-02-10 PROCEDURE — 36415 COLL VENOUS BLD VENIPUNCTURE: CPT

## 2025-02-10 PROCEDURE — 82043 UR ALBUMIN QUANTITATIVE: CPT

## 2025-02-10 RX ORDER — LORATADINE 10 MG/1
10 TABLET ORAL DAILY
Qty: 90 TABLET | Refills: 0 | Status: SHIPPED | OUTPATIENT
Start: 2025-02-10

## 2025-02-11 LAB
EST. AVERAGE GLUCOSE BLD GHB EST-MCNC: 140 MG/DL
HBA1C MFR BLD: 6.5 % (ref 4–6)

## 2025-02-20 ENCOUNTER — OFFICE VISIT (OUTPATIENT)
Dept: FAMILY MEDICINE CLINIC | Age: 65
End: 2025-02-20

## 2025-02-20 VITALS
OXYGEN SATURATION: 95 % | DIASTOLIC BLOOD PRESSURE: 82 MMHG | BODY MASS INDEX: 29.59 KG/M2 | HEART RATE: 89 BPM | HEIGHT: 65 IN | WEIGHT: 177.6 LBS | SYSTOLIC BLOOD PRESSURE: 134 MMHG

## 2025-02-20 DIAGNOSIS — R35.1 BENIGN PROSTATIC HYPERPLASIA WITH NOCTURIA: ICD-10-CM

## 2025-02-20 DIAGNOSIS — I10 ESSENTIAL HYPERTENSION: ICD-10-CM

## 2025-02-20 DIAGNOSIS — N40.1 BENIGN PROSTATIC HYPERPLASIA WITH NOCTURIA: ICD-10-CM

## 2025-02-20 DIAGNOSIS — E11.9 TYPE 2 DIABETES MELLITUS WITHOUT COMPLICATION, WITHOUT LONG-TERM CURRENT USE OF INSULIN (HCC): ICD-10-CM

## 2025-02-20 DIAGNOSIS — M15.0 PRIMARY OSTEOARTHRITIS INVOLVING MULTIPLE JOINTS: ICD-10-CM

## 2025-02-20 DIAGNOSIS — Z00.00 MEDICARE ANNUAL WELLNESS VISIT, SUBSEQUENT: Primary | ICD-10-CM

## 2025-02-20 RX ORDER — FINASTERIDE 5 MG/1
5 TABLET, FILM COATED ORAL DAILY
Qty: 90 TABLET | Refills: 1 | Status: SHIPPED | OUTPATIENT
Start: 2025-02-20

## 2025-02-20 RX ORDER — VITAMIN B COMPLEX
1 CAPSULE ORAL DAILY
COMMUNITY

## 2025-02-20 SDOH — ECONOMIC STABILITY: FOOD INSECURITY: WITHIN THE PAST 12 MONTHS, THE FOOD YOU BOUGHT JUST DIDN'T LAST AND YOU DIDN'T HAVE MONEY TO GET MORE.: NEVER TRUE

## 2025-02-20 SDOH — ECONOMIC STABILITY: FOOD INSECURITY: WITHIN THE PAST 12 MONTHS, YOU WORRIED THAT YOUR FOOD WOULD RUN OUT BEFORE YOU GOT MONEY TO BUY MORE.: NEVER TRUE

## 2025-02-20 ASSESSMENT — PATIENT HEALTH QUESTIONNAIRE - PHQ9
SUM OF ALL RESPONSES TO PHQ9 QUESTIONS 1 & 2: 0
10. IF YOU CHECKED OFF ANY PROBLEMS, HOW DIFFICULT HAVE THESE PROBLEMS MADE IT FOR YOU TO DO YOUR WORK, TAKE CARE OF THINGS AT HOME, OR GET ALONG WITH OTHER PEOPLE: NOT DIFFICULT AT ALL
7. TROUBLE CONCENTRATING ON THINGS, SUCH AS READING THE NEWSPAPER OR WATCHING TELEVISION: NOT AT ALL
SUM OF ALL RESPONSES TO PHQ QUESTIONS 1-9: 0
SUM OF ALL RESPONSES TO PHQ QUESTIONS 1-9: 0
1. LITTLE INTEREST OR PLEASURE IN DOING THINGS: NOT AT ALL
6. FEELING BAD ABOUT YOURSELF - OR THAT YOU ARE A FAILURE OR HAVE LET YOURSELF OR YOUR FAMILY DOWN: NOT AT ALL
9. THOUGHTS THAT YOU WOULD BE BETTER OFF DEAD, OR OF HURTING YOURSELF: NOT AT ALL
3. TROUBLE FALLING OR STAYING ASLEEP: NOT AT ALL
5. POOR APPETITE OR OVEREATING: NOT AT ALL
4. FEELING TIRED OR HAVING LITTLE ENERGY: NOT AT ALL
8. MOVING OR SPEAKING SO SLOWLY THAT OTHER PEOPLE COULD HAVE NOTICED. OR THE OPPOSITE, BEING SO FIGETY OR RESTLESS THAT YOU HAVE BEEN MOVING AROUND A LOT MORE THAN USUAL: NOT AT ALL
SUM OF ALL RESPONSES TO PHQ QUESTIONS 1-9: 0
SUM OF ALL RESPONSES TO PHQ QUESTIONS 1-9: 0
2. FEELING DOWN, DEPRESSED OR HOPELESS: NOT AT ALL

## 2025-02-20 ASSESSMENT — VISUAL ACUITY
OD_CC: 20/30
OS_CC: 20/25

## 2025-02-20 ASSESSMENT — ENCOUNTER SYMPTOMS
CHEST TIGHTNESS: 0
COUGH: 0
WHEEZING: 0
SHORTNESS OF BREATH: 0

## 2025-02-20 ASSESSMENT — LIFESTYLE VARIABLES
HOW OFTEN DO YOU HAVE A DRINK CONTAINING ALCOHOL: NEVER
HOW MANY STANDARD DRINKS CONTAINING ALCOHOL DO YOU HAVE ON A TYPICAL DAY: PATIENT DOES NOT DRINK

## 2025-02-20 NOTE — PROGRESS NOTES
[facility]  [address] [facility] [address]  Dept: [phone]  Dept Fax: [phone]  Loc: [phone]    Roger Holman  is a 64 y.o. male who presents today for his medical conditions/complaints as noted below.  Roger Holman is c/o of   Chief Complaint   Patient presents with    Medicare AW       HPI:     History of Present Illness  The patient is a 64-year-old male here today for his Medicare wellness visit and follow-up of diabetes, hypertension, and GERD.    He reports overall good health but has been experiencing a persistent rash on his right foot, which he describes as purple in color. He also notes the presence of a bump on his toe, which he attributes to a previous injury. Despite daily application of Lubriderm, he observes the development of new scales on his feet, resembling blonde hairs. The rash, present for several years, is exclusive to his right foot. He recalls a previous episode of foot swelling, for which he was treated for arthritis and tested negative for gout. He also mentions a past history of toe arthritis, which was particularly severe during his last visit in June 2024. He has not experienced any recent numbness or tingling in his feet, although he did have some tingling in the past.    He does not monitor his blood glucose levels at home but reports that his recent test results were slightly elevated, though lower than the previous reading. He expresses a desire to reduce his sugar intake.    He reports no chest pain or shortness of breath. He experienced episodes of dizziness a few months ago, particularly after performing back exercises and rising from the floor. These episodes were severe enough to necessitate sitting on the couch for a few days. He sought advice from a pharmacist who recommended vitamin B complex supplementation, which he found beneficial. He no longer experiences

## 2025-02-20 NOTE — PROGRESS NOTES
Medicare Annual Wellness Visit    Roger Holman is here for Medicare AWV    Assessment & Plan   Medicare annual wellness visit, subsequent  Type 2 diabetes mellitus without complication, without long-term current use of insulin ([facility])  -     Basic Metabolic Panel; Future  -     Hemoglobin A1C; Future  -     Albumin/Creatinine Ratio, Urine; Future  Essential hypertension  Primary osteoarthritis involving multiple joints  Benign prostatic hyperplasia with nocturia       Return in about 1 year (around 2/20/2026) for MWV.     Subjective   The following acute and/or chronic problems were also addressed today:  DM, htn, arthritis, BPH    Patient's complete Health Risk Assessment and screening values have been reviewed and are found in Flowsheets. The following problems were reviewed today and where indicated follow up appointments were made and/or referrals ordered.    Positive Risk Factor Screenings with Interventions:                   Vision Screen:  Visual Acuity screen is abnormal due to a score of 20/25 or worse.  Do you have difficulty driving, watching TV, or doing any of your daily activities because of your eyesight?: No  Have you had an eye exam within the past year?: (!) No  Interventions:   Patient encouraged to make appointment with their eye specialist                    Objective   Vision Screening    Right eye Left eye Both eyes   Without correction      With correction 20/30 20/25 20/25      Vitals:    02/20/25 1523   BP: 134/82   Site: Left Upper Arm   Position: Sitting   Cuff Size: Medium Adult   Pulse: 89   SpO2: 95%   Weight: 80.6 kg (177 lb 9.6 oz)   Height: 1.651 m (5' 5\")      Body mass index is 29.55 kg/m².                  Allergies   Allergen Reactions    Niaspan [Niacin] Hives     Prior to Visit Medications    Medication Sig Taking? Authorizing Provider   b complex vitamins capsule Take 1 capsule by mouth daily Yes Provider, MD Lana   finasteride (PROSCAR) 5 MG tablet Take 1 tablet by

## 2025-02-20 NOTE — PATIENT INSTRUCTIONS
Personalized Preventive Plan for Roger Holman - 2/20/2025  Medicare offers a range of preventive health benefits. Some of the tests and screenings are paid in full while other may be subject to a deductible, co-insurance, and/or copay.  Some of these benefits include a comprehensive review of your medical history including lifestyle, illnesses that may run in your family, and various assessments and screenings as appropriate.  After reviewing your medical record and screening and assessments performed today your provider may have ordered immunizations, labs, imaging, and/or referrals for you.  A list of these orders (if applicable) as well as your Preventive Care list are included within your After Visit Summary for your review.

## 2025-05-12 DIAGNOSIS — I10 ESSENTIAL HYPERTENSION: ICD-10-CM

## 2025-05-12 RX ORDER — METOPROLOL SUCCINATE 25 MG/1
25 TABLET, EXTENDED RELEASE ORAL DAILY
Qty: 90 TABLET | Refills: 1 | Status: SHIPPED | OUTPATIENT
Start: 2025-05-12

## 2025-05-12 RX ORDER — ENALAPRIL MALEATE 20 MG/1
20 TABLET ORAL DAILY
Qty: 90 TABLET | Refills: 1 | Status: SHIPPED | OUTPATIENT
Start: 2025-05-12

## 2025-05-12 RX ORDER — LORATADINE 10 MG/1
10 TABLET ORAL DAILY
Qty: 90 TABLET | Refills: 0 | Status: SHIPPED | OUTPATIENT
Start: 2025-05-12

## 2025-05-12 NOTE — TELEPHONE ENCOUNTER
Roger called requesting a refill of the below medication which has been pended for you:     Requested Prescriptions     Pending Prescriptions Disp Refills    ALLERGY RELIEF 10 MG tablet [Pharmacy Med Name: LORATADINE 10MG TABLET] 90 tablet 0     Sig: TAKE ONE TABLET BY MOUTH ONCE A DAY    enalapril (VASOTEC) 20 MG tablet [Pharmacy Med Name: ENALAPRIL MALEATE 20MG TABS] 90 tablet 1     Sig: TAKE ONE TABLET BY MOUTH ONCE A DAY    metoprolol succinate (TOPROL XL) 25 MG extended release tablet [Pharmacy Med Name: METOPROLOL SUCCINATE ER 25MG TB24] 90 tablet 1     Sig: TAKE ONE TABLET BY MOUTH ONCE A DAY       Last Appointment Date: 2/20/2025  Next Appointment Date: 2/23/2026    Allergies   Allergen Reactions    Niaspan [Niacin] Hives